# Patient Record
Sex: FEMALE | Race: BLACK OR AFRICAN AMERICAN | NOT HISPANIC OR LATINO | URBAN - METROPOLITAN AREA
[De-identification: names, ages, dates, MRNs, and addresses within clinical notes are randomized per-mention and may not be internally consistent; named-entity substitution may affect disease eponyms.]

---
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
## 2018-04-18 ENCOUNTER — EMERGENCY (EMERGENCY)
Facility: HOSPITAL | Age: 18
LOS: 1 days | Discharge: HOME | End: 2018-04-18
Attending: EMERGENCY MEDICINE
Payer: COMMERCIAL

## 2018-04-18 VITALS
SYSTOLIC BLOOD PRESSURE: 145 MMHG | OXYGEN SATURATION: 100 % | TEMPERATURE: 98 F | RESPIRATION RATE: 18 BRPM | DIASTOLIC BLOOD PRESSURE: 87 MMHG | HEART RATE: 82 BPM

## 2018-04-18 VITALS
RESPIRATION RATE: 16 BRPM | OXYGEN SATURATION: 100 % | SYSTOLIC BLOOD PRESSURE: 115 MMHG | HEART RATE: 75 BPM | TEMPERATURE: 99 F | DIASTOLIC BLOOD PRESSURE: 70 MMHG

## 2018-04-18 LAB
ALBUMIN SERPL ELPH-MCNC: 4.4 G/DL — SIGNIFICANT CHANGE UP (ref 3.3–5.2)
ALP SERPL-CCNC: 47 U/L — SIGNIFICANT CHANGE UP (ref 40–120)
ALT FLD-CCNC: 10 U/L — SIGNIFICANT CHANGE UP
ANION GAP SERPL CALC-SCNC: 12 MMOL/L — SIGNIFICANT CHANGE UP (ref 5–17)
ANISOCYTOSIS BLD QL: SLIGHT — SIGNIFICANT CHANGE UP
APPEARANCE UR: CLEAR — SIGNIFICANT CHANGE UP
APTT BLD: 25.9 SEC — LOW (ref 27.5–37.4)
AST SERPL-CCNC: 20 U/L — SIGNIFICANT CHANGE UP
BILIRUB SERPL-MCNC: 0.3 MG/DL — LOW (ref 0.4–2)
BILIRUB UR-MCNC: NEGATIVE — SIGNIFICANT CHANGE UP
BLD GP AB SCN SERPL QL: SIGNIFICANT CHANGE UP
BUN SERPL-MCNC: 10 MG/DL — SIGNIFICANT CHANGE UP (ref 8–20)
CALCIUM SERPL-MCNC: 9.2 MG/DL — SIGNIFICANT CHANGE UP (ref 8.6–10.2)
CHLORIDE SERPL-SCNC: 101 MMOL/L — SIGNIFICANT CHANGE UP (ref 98–107)
CO2 SERPL-SCNC: 23 MMOL/L — SIGNIFICANT CHANGE UP (ref 22–29)
COLOR SPEC: YELLOW — SIGNIFICANT CHANGE UP
COMMENT - URINE: SIGNIFICANT CHANGE UP
CREAT SERPL-MCNC: 0.51 MG/DL — SIGNIFICANT CHANGE UP (ref 0.5–1.3)
DIFF PNL FLD: NEGATIVE — SIGNIFICANT CHANGE UP
GLUCOSE SERPL-MCNC: 103 MG/DL — SIGNIFICANT CHANGE UP (ref 70–115)
GLUCOSE UR QL: NEGATIVE MG/DL — SIGNIFICANT CHANGE UP
HCG UR QL: NEGATIVE — SIGNIFICANT CHANGE UP
HCT VFR BLD CALC: 31.2 % — LOW (ref 37–47)
HGB BLD-MCNC: 9.5 G/DL — LOW (ref 12–16)
HYPOCHROMIA BLD QL: SLIGHT — SIGNIFICANT CHANGE UP
INR BLD: 1.15 RATIO — SIGNIFICANT CHANGE UP (ref 0.88–1.16)
KETONES UR-MCNC: NEGATIVE — SIGNIFICANT CHANGE UP
LEUKOCYTE ESTERASE UR-ACNC: NEGATIVE — SIGNIFICANT CHANGE UP
LIDOCAIN IGE QN: 40 U/L — SIGNIFICANT CHANGE UP (ref 22–51)
LYMPHOCYTES # BLD AUTO: 0.7 K/UL — LOW (ref 1–4.8)
LYMPHOCYTES # BLD AUTO: 9 % — LOW (ref 20–55)
MCHC RBC-ENTMCNC: 22.5 PG — LOW (ref 27–31)
MCHC RBC-ENTMCNC: 30.4 G/DL — LOW (ref 32–36)
MCV RBC AUTO: 73.8 FL — LOW (ref 81–99)
MICROCYTES BLD QL: SLIGHT — SIGNIFICANT CHANGE UP
MONOCYTES # BLD AUTO: 0.4 K/UL — SIGNIFICANT CHANGE UP (ref 0–0.8)
MONOCYTES NFR BLD AUTO: 5 % — SIGNIFICANT CHANGE UP (ref 3–10)
NEUTROPHILS # BLD AUTO: 6.7 K/UL — SIGNIFICANT CHANGE UP (ref 1.8–8)
NEUTROPHILS NFR BLD AUTO: 84 % — HIGH (ref 37–73)
NEUTS BAND # BLD: 2 % — SIGNIFICANT CHANGE UP (ref 0–8)
NITRITE UR-MCNC: NEGATIVE — SIGNIFICANT CHANGE UP
OVALOCYTES BLD QL SMEAR: SLIGHT — SIGNIFICANT CHANGE UP
PH UR: 6.5 — SIGNIFICANT CHANGE UP (ref 5–8)
PLAT MORPH BLD: NORMAL — SIGNIFICANT CHANGE UP
PLATELET # BLD AUTO: 508 K/UL — HIGH (ref 150–400)
POIKILOCYTOSIS BLD QL AUTO: SLIGHT — SIGNIFICANT CHANGE UP
POTASSIUM SERPL-MCNC: 3.8 MMOL/L — SIGNIFICANT CHANGE UP (ref 3.5–5.3)
POTASSIUM SERPL-SCNC: 3.8 MMOL/L — SIGNIFICANT CHANGE UP (ref 3.5–5.3)
PROT SERPL-MCNC: 8.5 G/DL — SIGNIFICANT CHANGE UP (ref 6.6–8.7)
PROT UR-MCNC: 15 MG/DL
PROTHROM AB SERPL-ACNC: 12.7 SEC — SIGNIFICANT CHANGE UP (ref 9.8–12.7)
RBC # BLD: 4.23 M/UL — LOW (ref 4.4–5.2)
RBC # FLD: 16.6 % — HIGH (ref 11–15.6)
RBC BLD AUTO: ABNORMAL
SODIUM SERPL-SCNC: 136 MMOL/L — SIGNIFICANT CHANGE UP (ref 135–145)
SP GR SPEC: 1.02 — SIGNIFICANT CHANGE UP (ref 1.01–1.02)
TYPE + AB SCN PNL BLD: SIGNIFICANT CHANGE UP
UROBILINOGEN FLD QL: 1 MG/DL
WBC # BLD: 7.9 K/UL — SIGNIFICANT CHANGE UP (ref 4.8–10.8)
WBC # FLD AUTO: 7.9 K/UL — SIGNIFICANT CHANGE UP (ref 4.8–10.8)
WBC UR QL: SIGNIFICANT CHANGE UP

## 2018-04-18 PROCEDURE — 87086 URINE CULTURE/COLONY COUNT: CPT

## 2018-04-18 PROCEDURE — 85610 PROTHROMBIN TIME: CPT

## 2018-04-18 PROCEDURE — 86850 RBC ANTIBODY SCREEN: CPT

## 2018-04-18 PROCEDURE — 80053 COMPREHEN METABOLIC PANEL: CPT

## 2018-04-18 PROCEDURE — 76856 US EXAM PELVIC COMPLETE: CPT

## 2018-04-18 PROCEDURE — 81025 URINE PREGNANCY TEST: CPT

## 2018-04-18 PROCEDURE — 85730 THROMBOPLASTIN TIME PARTIAL: CPT

## 2018-04-18 PROCEDURE — 76856 US EXAM PELVIC COMPLETE: CPT | Mod: 26

## 2018-04-18 PROCEDURE — 76770 US EXAM ABDO BACK WALL COMP: CPT | Mod: 26

## 2018-04-18 PROCEDURE — 86900 BLOOD TYPING SEROLOGIC ABO: CPT

## 2018-04-18 PROCEDURE — 99284 EMERGENCY DEPT VISIT MOD MDM: CPT | Mod: 25

## 2018-04-18 PROCEDURE — 76770 US EXAM ABDO BACK WALL COMP: CPT

## 2018-04-18 PROCEDURE — 83690 ASSAY OF LIPASE: CPT

## 2018-04-18 PROCEDURE — 99284 EMERGENCY DEPT VISIT MOD MDM: CPT

## 2018-04-18 PROCEDURE — 96374 THER/PROPH/DIAG INJ IV PUSH: CPT

## 2018-04-18 PROCEDURE — 85027 COMPLETE CBC AUTOMATED: CPT

## 2018-04-18 PROCEDURE — 36415 COLL VENOUS BLD VENIPUNCTURE: CPT

## 2018-04-18 PROCEDURE — 86901 BLOOD TYPING SEROLOGIC RH(D): CPT

## 2018-04-18 PROCEDURE — 81001 URINALYSIS AUTO W/SCOPE: CPT

## 2018-04-18 RX ORDER — ACETAMINOPHEN 500 MG
650 TABLET ORAL ONCE
Qty: 0 | Refills: 0 | Status: COMPLETED | OUTPATIENT
Start: 2018-04-18 | End: 2018-04-18

## 2018-04-18 RX ORDER — KETOROLAC TROMETHAMINE 30 MG/ML
15 SYRINGE (ML) INJECTION ONCE
Qty: 0 | Refills: 0 | Status: DISCONTINUED | OUTPATIENT
Start: 2018-04-18 | End: 2018-04-18

## 2018-04-18 RX ADMIN — Medication 650 MILLIGRAM(S): at 16:50

## 2018-04-18 RX ADMIN — Medication 15 MILLIGRAM(S): at 12:41

## 2018-04-18 RX ADMIN — Medication 15 MILLIGRAM(S): at 16:51

## 2018-04-18 NOTE — ED ADULT NURSE REASSESSMENT NOTE - NS ED NURSE REASSESS COMMENT FT1
pt conts to moisabel   intolerance to MOrphine 'I pass out'   pt states went to work this AM  ate [employer] late last night, while at work left and took an uber here  just had tea this AM   'I could be pregnant"   waiting lab result's  pt drinking for CT scan

## 2018-04-18 NOTE — ED ADULT NURSE NOTE - OBJECTIVE STATEMENT
pt presents to ED with abd pain. pt sleeping comfortably in recliner, pt woken up by RN and immediately started crying in pain. a&ox3. pt c/o n/v.  denies urinary complaints. a&ox3.

## 2018-04-18 NOTE — ED PROVIDER NOTE - ATTENDING CONTRIBUTION TO CARE
seen with acp  c/o lower abdominal pain with nausea and vomiting started her period 4 days ago  still  bleeding   pe abd tenderness in the lower abdomen  no guarding rebound  given toradol and had complete relief .patient is not pregnant  ultrasound is negative  Agree with acps assessment hx and physical

## 2018-04-18 NOTE — ED PROVIDER NOTE - PROGRESS NOTE DETAILS
Re-examination of patients abdomen, patient has no tenderness on palpation of abdomen, improvement of symptoms   Reviewed ultrasound results, lab work and urine, patient informed to follow up with ob/gyn, patient preinted copy of results, pt explained d/c instructions, pt verbalizes understanding d/c instructions

## 2018-04-18 NOTE — ED PROVIDER NOTE - OBJECTIVE STATEMENT
Patient is a 17 y/o female c/o of abdominal pain that started suddenly at 5 a.m. Patient states pain woke her in the morning. Patient states abdominal pain is in her lower abdomen.  Patient describes pain as sharp, and radiating to her lower back bilaterally. Patient admits to past history of kidney stones, states she has had kidney stones multiple times but never followed up with a radiologist. Patient denies abdominal surgeries. Patient admits she is currently on her menstrual cycle, but states her period was four days late and her period currently is very light, which is unusual for her menstrual cycle. Patient admits she can be pregnant. Patient admits to vomiting this morning one episode (non-bloody, non-bilious) and nausea. Patient denies chest pain, SOB, fevers, dysuria.

## 2018-04-18 NOTE — ED PROVIDER NOTE - PHYSICAL EXAMINATION
General: Well appearing, very uncomfortable and tearful on exam Eyes: PERRL, conjunctiva pink, sclera white bilaterally Cardio: S1/S2, no murmurs Resp: Clear to auscultation bilaterally no wheezes, rales or rhonchi Abd: Soft, lower abdominal tenderness on examination (RLQ/LLQ), nondistended : Right CVA tenderness MSK: FROM in all extremities Skin: Warm, dry without rashes

## 2018-04-19 LAB
CULTURE RESULTS: SIGNIFICANT CHANGE UP
SPECIMEN SOURCE: SIGNIFICANT CHANGE UP

## 2019-01-22 ENCOUNTER — EMERGENCY (EMERGENCY)
Facility: HOSPITAL | Age: 19
LOS: 1 days | Discharge: HOME | End: 2019-01-22
Attending: EMERGENCY MEDICINE
Payer: COMMERCIAL

## 2019-01-22 VITALS
WEIGHT: 143.08 LBS | RESPIRATION RATE: 17 BRPM | HEART RATE: 78 BPM | HEIGHT: 64 IN | OXYGEN SATURATION: 100 % | TEMPERATURE: 98 F | SYSTOLIC BLOOD PRESSURE: 109 MMHG | DIASTOLIC BLOOD PRESSURE: 72 MMHG

## 2019-01-22 LAB
ALBUMIN SERPL ELPH-MCNC: 4.6 G/DL — SIGNIFICANT CHANGE UP (ref 3.3–5.2)
ALP SERPL-CCNC: 44 U/L — SIGNIFICANT CHANGE UP (ref 40–120)
ALT FLD-CCNC: 12 U/L — SIGNIFICANT CHANGE UP
ANION GAP SERPL CALC-SCNC: 14 MMOL/L — SIGNIFICANT CHANGE UP (ref 5–17)
APPEARANCE UR: CLEAR — SIGNIFICANT CHANGE UP
AST SERPL-CCNC: 28 U/L — SIGNIFICANT CHANGE UP
BILIRUB SERPL-MCNC: 0.4 MG/DL — SIGNIFICANT CHANGE UP (ref 0.4–2)
BILIRUB UR-MCNC: NEGATIVE — SIGNIFICANT CHANGE UP
BUN SERPL-MCNC: 9 MG/DL — SIGNIFICANT CHANGE UP (ref 8–20)
CALCIUM SERPL-MCNC: 9 MG/DL — SIGNIFICANT CHANGE UP (ref 8.6–10.2)
CHLORIDE SERPL-SCNC: 101 MMOL/L — SIGNIFICANT CHANGE UP (ref 98–107)
CO2 SERPL-SCNC: 22 MMOL/L — SIGNIFICANT CHANGE UP (ref 22–29)
COLOR SPEC: YELLOW — SIGNIFICANT CHANGE UP
CREAT SERPL-MCNC: 0.49 MG/DL — LOW (ref 0.5–1.3)
DIFF PNL FLD: NEGATIVE — SIGNIFICANT CHANGE UP
GLUCOSE SERPL-MCNC: 98 MG/DL — SIGNIFICANT CHANGE UP (ref 70–115)
GLUCOSE UR QL: NEGATIVE MG/DL — SIGNIFICANT CHANGE UP
HCG UR QL: NEGATIVE — SIGNIFICANT CHANGE UP
HCT VFR BLD CALC: 30.8 % — LOW (ref 37–47)
HGB BLD-MCNC: 8.7 G/DL — LOW (ref 12–16)
KETONES UR-MCNC: NEGATIVE — SIGNIFICANT CHANGE UP
LEUKOCYTE ESTERASE UR-ACNC: ABNORMAL
MCHC RBC-ENTMCNC: 19.6 PG — LOW (ref 27–31)
MCHC RBC-ENTMCNC: 28.2 G/DL — LOW (ref 32–36)
MCV RBC AUTO: 69.4 FL — LOW (ref 81–99)
NITRITE UR-MCNC: NEGATIVE — SIGNIFICANT CHANGE UP
PH UR: 6.5 — SIGNIFICANT CHANGE UP (ref 5–8)
PLATELET # BLD AUTO: 647 K/UL — HIGH (ref 150–400)
POTASSIUM SERPL-MCNC: 3.7 MMOL/L — SIGNIFICANT CHANGE UP (ref 3.5–5.3)
POTASSIUM SERPL-SCNC: 3.7 MMOL/L — SIGNIFICANT CHANGE UP (ref 3.5–5.3)
PROT SERPL-MCNC: 8.6 G/DL — SIGNIFICANT CHANGE UP (ref 6.6–8.7)
PROT UR-MCNC: 15 MG/DL
RBC # BLD: 4.44 M/UL — SIGNIFICANT CHANGE UP (ref 4.4–5.2)
RBC # FLD: 18.3 % — HIGH (ref 11–15.6)
SODIUM SERPL-SCNC: 137 MMOL/L — SIGNIFICANT CHANGE UP (ref 135–145)
SP GR SPEC: 1.01 — SIGNIFICANT CHANGE UP (ref 1.01–1.02)
UROBILINOGEN FLD QL: NEGATIVE MG/DL — SIGNIFICANT CHANGE UP
WBC # BLD: 7.1 K/UL — SIGNIFICANT CHANGE UP (ref 4.8–10.8)
WBC # FLD AUTO: 7.1 K/UL — SIGNIFICANT CHANGE UP (ref 4.8–10.8)

## 2019-01-22 PROCEDURE — 85027 COMPLETE CBC AUTOMATED: CPT

## 2019-01-22 PROCEDURE — 74176 CT ABD & PELVIS W/O CONTRAST: CPT | Mod: 26

## 2019-01-22 PROCEDURE — 81001 URINALYSIS AUTO W/SCOPE: CPT

## 2019-01-22 PROCEDURE — 96375 TX/PRO/DX INJ NEW DRUG ADDON: CPT

## 2019-01-22 PROCEDURE — 99284 EMERGENCY DEPT VISIT MOD MDM: CPT | Mod: 25

## 2019-01-22 PROCEDURE — 96374 THER/PROPH/DIAG INJ IV PUSH: CPT

## 2019-01-22 PROCEDURE — 36415 COLL VENOUS BLD VENIPUNCTURE: CPT

## 2019-01-22 PROCEDURE — 99284 EMERGENCY DEPT VISIT MOD MDM: CPT

## 2019-01-22 PROCEDURE — 81025 URINE PREGNANCY TEST: CPT

## 2019-01-22 PROCEDURE — 74176 CT ABD & PELVIS W/O CONTRAST: CPT

## 2019-01-22 PROCEDURE — 80053 COMPREHEN METABOLIC PANEL: CPT

## 2019-01-22 RX ORDER — ONDANSETRON 8 MG/1
8 TABLET, FILM COATED ORAL ONCE
Qty: 0 | Refills: 0 | Status: COMPLETED | OUTPATIENT
Start: 2019-01-22 | End: 2019-01-22

## 2019-01-22 RX ORDER — ONDANSETRON 8 MG/1
8 TABLET, FILM COATED ORAL ONCE
Qty: 0 | Refills: 0 | Status: DISCONTINUED | OUTPATIENT
Start: 2019-01-22 | End: 2019-01-22

## 2019-01-22 RX ORDER — METOCLOPRAMIDE HCL 10 MG
8 TABLET ORAL ONCE
Qty: 0 | Refills: 0 | Status: DISCONTINUED | OUTPATIENT
Start: 2019-01-22 | End: 2019-01-27

## 2019-01-22 RX ORDER — KETOROLAC TROMETHAMINE 30 MG/ML
15 SYRINGE (ML) INJECTION ONCE
Qty: 0 | Refills: 0 | Status: DISCONTINUED | OUTPATIENT
Start: 2019-01-22 | End: 2019-01-22

## 2019-01-22 RX ORDER — SODIUM CHLORIDE 9 MG/ML
1000 INJECTION INTRAMUSCULAR; INTRAVENOUS; SUBCUTANEOUS ONCE
Qty: 0 | Refills: 0 | Status: COMPLETED | OUTPATIENT
Start: 2019-01-22 | End: 2019-01-22

## 2019-01-22 RX ADMIN — Medication 15 MILLIGRAM(S): at 13:07

## 2019-01-22 RX ADMIN — SODIUM CHLORIDE 1000 MILLILITER(S): 9 INJECTION INTRAMUSCULAR; INTRAVENOUS; SUBCUTANEOUS at 13:07

## 2019-01-22 RX ADMIN — ONDANSETRON 8 MILLIGRAM(S): 8 TABLET, FILM COATED ORAL at 13:06

## 2019-01-22 RX ADMIN — Medication 15 MILLIGRAM(S): at 13:43

## 2019-01-22 NOTE — ED STATDOCS - PROGRESS NOTE DETAILS
Lab/CT results discussed with patient. Patient informed no renal stone on CT. Pt instructed to use OTC tylenol/motrin as needed for pain and to follow-up with her PMD.

## 2019-01-22 NOTE — ED ADULT NURSE REASSESSMENT NOTE - NS ED NURSE REASSESS COMMENT FT1
pt educated on why itis important to wait for pregnancy test regarding toradol. pt reports "I don't care. Im not pregnant. give me the pain medicine."

## 2019-01-22 NOTE — ED STATDOCS - NS_ ATTENDINGSCRIBEDETAILS _ED_A_ED_FT
I performed the initial face to face bedside interview with this patient regarding history of present illness, review of symptoms and relevant past medical, social and family history.  I completed an independent physical examination.  I was the initial provider who evaluated this patient. I have signed out the follow up of any pending tests (i.e. labs, radiological studies) to the ACP.  I have communicated the patient’s plan of care and disposition with the ACP.

## 2019-01-22 NOTE — ED ADULT NURSE NOTE - OBJECTIVE STATEMENT
"I feel like I have another kidney stone." left flank pain radiating to the abdomen with n/v/d. pt has no other complaints.

## 2019-01-22 NOTE — ED ADULT TRIAGE NOTE - CHIEF COMPLAINT QUOTE
sent from md office for eval of n/v/d onset yesterday states took a friends oxy cause she was having flank pain ,hx stones. vss

## 2019-01-22 NOTE — ED ADULT NURSE NOTE - NSIMPLEMENTINTERV_GEN_ALL_ED
Implemented All Universal Safety Interventions:  [place] to call system. Call bell, personal items and telephone within reach. Instruct patient to call for assistance. Room bathroom lighting operational. Non-slip footwear when patient is off stretcher. Physically safe environment: no spills, clutter or unnecessary equipment. Stretcher in lowest position, wheels locked, appropriate side rails in place.

## 2019-01-22 NOTE — ED STATDOCS - ENMT, MLM
Nasal mucosa clear.  Mouth with normal mucosa.  Throat has no vesicles, no oropharyngeal exudates and uvula is midline.

## 2019-01-22 NOTE — ED STATDOCS - OBJECTIVE STATEMENT
18 y/o F pt with PMHx kidney stone (last stone one year ago), pyelonephritis, asthma presents to the ED c/o L-sided abdominal pain beginning last night.  She reports L-sided abdominal pain, radiating to her L flank.  Pt was having difficult sleeping last night secondary to her pain, and this morning pt began feeling, had one episode of vomiting and one episode of diarrhea.  Pt's current pain feels similar to her previous kidney stone.  Denies fever, chills, dysuria, hematuria, CP, SOB.  No further acute complaints at this time.

## 2019-01-22 NOTE — ED STATDOCS - MEDICAL DECISION MAKING DETAILS
pt with hx kidney stone, current pain feels similar, severe nausea, will treat nausea and pain and evaluate for stone.

## 2019-02-01 PROBLEM — N12 TUBULO-INTERSTITIAL NEPHRITIS, NOT SPECIFIED AS ACUTE OR CHRONIC: Chronic | Status: INACTIVE | Noted: 2017-07-11 | Resolved: 2019-01-31
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
## 2020-05-12 ENCOUNTER — APPOINTMENT (OUTPATIENT)
Dept: ANTEPARTUM | Facility: CLINIC | Age: 20
End: 2020-05-12
Payer: MEDICAID

## 2020-05-12 ENCOUNTER — APPOINTMENT (OUTPATIENT)
Dept: OBGYN | Facility: CLINIC | Age: 20
End: 2020-05-12
Payer: MEDICAID

## 2020-05-12 ENCOUNTER — ASOB RESULT (OUTPATIENT)
Age: 20
End: 2020-05-12

## 2020-05-12 VITALS
DIASTOLIC BLOOD PRESSURE: 70 MMHG | SYSTOLIC BLOOD PRESSURE: 118 MMHG | WEIGHT: 143 LBS | HEIGHT: 64 IN | BODY MASS INDEX: 24.41 KG/M2

## 2020-05-12 DIAGNOSIS — Z86.69 PERSONAL HISTORY OF OTHER DISEASES OF THE NERVOUS SYSTEM AND SENSE ORGANS: ICD-10-CM

## 2020-05-12 DIAGNOSIS — Z86.2 PERSONAL HISTORY OF DISEASES OF THE BLOOD AND BLOOD-FORMING ORGANS AND CERTAIN DISORDERS INVOLVING THE IMMUNE MECHANISM: ICD-10-CM

## 2020-05-12 DIAGNOSIS — Z82.5 FAMILY HISTORY OF ASTHMA AND OTHER CHRONIC LOWER RESPIRATORY DISEASES: ICD-10-CM

## 2020-05-12 DIAGNOSIS — Z78.9 OTHER SPECIFIED HEALTH STATUS: ICD-10-CM

## 2020-05-12 DIAGNOSIS — D64.9 ANEMIA, UNSPECIFIED: ICD-10-CM

## 2020-05-12 DIAGNOSIS — Z82.49 FAMILY HISTORY OF ISCHEMIC HEART DISEASE AND OTHER DISEASES OF THE CIRCULATORY SYSTEM: ICD-10-CM

## 2020-05-12 DIAGNOSIS — Z87.09 PERSONAL HISTORY OF OTHER DISEASES OF THE RESPIRATORY SYSTEM: ICD-10-CM

## 2020-05-12 DIAGNOSIS — Z83.3 FAMILY HISTORY OF DIABETES MELLITUS: ICD-10-CM

## 2020-05-12 DIAGNOSIS — Z80.0 FAMILY HISTORY OF MALIGNANT NEOPLASM OF DIGESTIVE ORGANS: ICD-10-CM

## 2020-05-12 LAB
HCG UR QL: POSITIVE
QUALITY CONTROL: YES

## 2020-05-12 PROCEDURE — 36415 COLL VENOUS BLD VENIPUNCTURE: CPT

## 2020-05-12 PROCEDURE — 99203 OFFICE O/P NEW LOW 30 MIN: CPT | Mod: 25

## 2020-05-12 PROCEDURE — 81025 URINE PREGNANCY TEST: CPT

## 2020-05-12 PROCEDURE — 99385 PREV VISIT NEW AGE 18-39: CPT

## 2020-05-12 PROCEDURE — 76817 TRANSVAGINAL US OBSTETRIC: CPT

## 2020-05-12 NOTE — PHYSICAL EXAM
[Alert] : alert [Awake] : awake [Soft] : soft [Oriented x3] : oriented to person, place, and time [Normal] : cervix [No Bleeding] : there was no active vaginal bleeding [Enlarged ___ wks] : enlarged [unfilled] ~Uweeks [Uterine Adnexae] : were not tender and not enlarged [Acute Distress] : no acute distress [Mass] : no breast mass [Nipple Discharge] : no nipple discharge [Axillary LAD] : no axillary lymphadenopathy [Tender] : non tender

## 2020-05-12 NOTE — HISTORY OF PRESENT ILLNESS
[Regular Cycle Intervals] : periods have been regular [Frequency: Q ___ days] : menstrual periods occur approximately every [unfilled] days [Menarche Age: ____] : age at menarche was [unfilled] [Spontaneous/Secondary] : is secondary/spontaneous [Pregnancy] : pregnant [Sexually Active] : is sexually active [Contraception] : does not use contraception

## 2020-05-15 PROBLEM — D64.9 ANEMIA: Status: ACTIVE | Noted: 2020-05-15

## 2020-05-15 LAB
ABO + RH PNL BLD: NORMAL
ALBUMIN SERPL ELPH-MCNC: 4.6 G/DL
ALP BLD-CCNC: 40 U/L
ALT SERPL-CCNC: 11 U/L
ANION GAP SERPL CALC-SCNC: 16 MMOL/L
APPEARANCE: CLEAR
AST SERPL-CCNC: 20 U/L
B19V IGG SER QL IA: 7 INDEX
B19V IGG+IGM SER-IMP: NORMAL
B19V IGG+IGM SER-IMP: POSITIVE
B19V IGM FLD-ACNC: 0.2 INDEX
B19V IGM SER-ACNC: NEGATIVE
BASOPHILS # BLD AUTO: 0.05 K/UL
BASOPHILS NFR BLD AUTO: 0.9 %
BILIRUB SERPL-MCNC: 0.4 MG/DL
BILIRUBIN URINE: NEGATIVE
BLD GP AB SCN SERPL QL: NORMAL
BLOOD URINE: NEGATIVE
BUN SERPL-MCNC: 8 MG/DL
C TRACH RRNA SPEC QL NAA+PROBE: NOT DETECTED
CALCIUM SERPL-MCNC: 9.5 MG/DL
CHLORIDE SERPL-SCNC: 100 MMOL/L
CMV IGG SERPL QL: <0.2 U/ML
CMV IGG SERPL-IMP: NEGATIVE
CO2 SERPL-SCNC: 21 MMOL/L
COLOR: NORMAL
CREAT SERPL-MCNC: 0.53 MG/DL
EOSINOPHIL # BLD AUTO: 0.12 K/UL
EOSINOPHIL NFR BLD AUTO: 2.1 %
GLUCOSE QUALITATIVE U: NEGATIVE
GLUCOSE SERPL-MCNC: 48 MG/DL
HBV SURFACE AG SER QL: NONREACTIVE
HCT VFR BLD CALC: 28.3 %
HCV AB SER QL: NONREACTIVE
HCV S/CO RATIO: 0.24 S/CO
HGB A MFR BLD: 98.1 %
HGB A2 MFR BLD: 1.9 %
HGB BLD-MCNC: 7.6 G/DL
HGB FRACT BLD-IMP: NORMAL
HIV1+2 AB SPEC QL IA.RAPID: NONREACTIVE
HPV HIGH+LOW RISK DNA PNL CVX: DETECTED
IMM GRANULOCYTES NFR BLD AUTO: 0.3 %
KETONES URINE: NEGATIVE
LEAD BLD-MCNC: NORMAL UG/DL
LEUKOCYTE ESTERASE URINE: NEGATIVE
LYMPHOCYTES # BLD AUTO: 2.05 K/UL
LYMPHOCYTES NFR BLD AUTO: 35.8 %
M TB IFN-G BLD-IMP: NEGATIVE
MAN DIFF?: NORMAL
MCHC RBC-ENTMCNC: 19.2 PG
MCHC RBC-ENTMCNC: 26.9 GM/DL
MCV RBC AUTO: 71.6 FL
MEV IGG FLD QL IA: <5 AU/ML
MEV IGG+IGM SER-IMP: NEGATIVE
MONOCYTES # BLD AUTO: 0.51 K/UL
MONOCYTES NFR BLD AUTO: 8.9 %
MUV AB SER-ACNC: NEGATIVE
MUV IGG SER QL IA: 6.6 AU/ML
N GONORRHOEA RRNA SPEC QL NAA+PROBE: NOT DETECTED
NEUTROPHILS # BLD AUTO: 2.97 K/UL
NEUTROPHILS NFR BLD AUTO: 52 %
NITRITE URINE: NEGATIVE
PH URINE: 6.5
PLATELET # BLD AUTO: 558 K/UL
POTASSIUM SERPL-SCNC: 4.4 MMOL/L
PROT SERPL-MCNC: 7.9 G/DL
PROTEIN URINE: NEGATIVE
QUANTIFERON TB PLUS MITOGEN MINUS NIL: 6.86 IU/ML
QUANTIFERON TB PLUS NIL: 0.01 IU/ML
QUANTIFERON TB PLUS TB1 MINUS NIL: 0 IU/ML
QUANTIFERON TB PLUS TB2 MINUS NIL: 0 IU/ML
RBC # BLD: 3.95 M/UL
RBC # FLD: 20.8 %
RUBV IGG FLD-ACNC: 2.8 INDEX
RUBV IGG SER-IMP: POSITIVE
SODIUM SERPL-SCNC: 137 MMOL/L
SOURCE AMPLIFICATION: NORMAL
SPECIFIC GRAVITY URINE: 1.01
T GONDII AB SER-IMP: NEGATIVE
T GONDII IGG SER QL: <3 IU/ML
T PALLIDUM AB SER QL IA: NEGATIVE
TSH SERPL-ACNC: 2.43 UIU/ML
UROBILINOGEN URINE: NORMAL
VZV AB TITR SER: POSITIVE
VZV IGG SER IF-ACNC: 2138 INDEX
WBC # FLD AUTO: 5.72 K/UL

## 2020-05-20 ENCOUNTER — APPOINTMENT (OUTPATIENT)
Dept: ANTEPARTUM | Facility: CLINIC | Age: 20
End: 2020-05-20
Payer: MEDICAID

## 2020-05-20 ENCOUNTER — ASOB RESULT (OUTPATIENT)
Age: 20
End: 2020-05-20

## 2020-05-20 VITALS — TEMPERATURE: 99.1 F

## 2020-05-20 PROCEDURE — 76817 TRANSVAGINAL US OBSTETRIC: CPT

## 2020-06-05 ENCOUNTER — NON-APPOINTMENT (OUTPATIENT)
Age: 20
End: 2020-06-05

## 2020-06-05 ENCOUNTER — APPOINTMENT (OUTPATIENT)
Dept: OBGYN | Facility: CLINIC | Age: 20
End: 2020-06-05
Payer: MEDICAID

## 2020-06-05 VITALS
WEIGHT: 147 LBS | DIASTOLIC BLOOD PRESSURE: 70 MMHG | BODY MASS INDEX: 25.1 KG/M2 | HEIGHT: 64 IN | SYSTOLIC BLOOD PRESSURE: 120 MMHG

## 2020-06-05 LAB
AR GENE MUT ANL BLD/T: NEGATIVE
CFTR MUT TESTED BLD/T: NEGATIVE
CYTOLOGY CVX/VAG DOC THIN PREP: ABNORMAL
FMR1 GENE MUT ANL BLD/T: NORMAL

## 2020-06-05 PROCEDURE — 99213 OFFICE O/P EST LOW 20 MIN: CPT | Mod: TH

## 2020-06-11 ENCOUNTER — APPOINTMENT (OUTPATIENT)
Dept: OBGYN | Facility: CLINIC | Age: 20
End: 2020-06-11
Payer: MEDICAID

## 2020-06-11 VITALS
DIASTOLIC BLOOD PRESSURE: 64 MMHG | HEIGHT: 64 IN | WEIGHT: 145 LBS | BODY MASS INDEX: 24.75 KG/M2 | SYSTOLIC BLOOD PRESSURE: 109 MMHG

## 2020-06-11 DIAGNOSIS — R87.612 LOW GRADE SQUAMOUS INTRAEPITHELIAL LESION ON CYTOLOGIC SMEAR OF CERVIX (LGSIL): ICD-10-CM

## 2020-06-11 PROCEDURE — 57452 EXAM OF CERVIX W/SCOPE: CPT

## 2020-06-11 NOTE — PROCEDURE
[LGSIL] : low grade squamous intraepithelial lesion [Colposcopy] : colposcopy [Patient] : patient [Benefits] : benefits [Risks] : risks [Alternatives] : alternatives [Infection] : infection [Bleeding] : bleeding [Consent Obtained] : written consent was obtained prior to the procedure [Allergic Reaction] : allergic reaction [SCJ Fully Visulized] : the squamocolumnar junction was fully visualized [Acetowhite ___ o'clock] : ascetowhite changes at [unfilled] ~Uo'clock [ECC Done] : Endocervical curettage was not performed. [Biopsies Taken: # ___] : no biopsies were taken of the cervix [Tolerated Well] : the patient tolerated the procedure well [No Complications] : there were no complications

## 2020-06-18 ENCOUNTER — APPOINTMENT (OUTPATIENT)
Dept: OBGYN | Facility: CLINIC | Age: 20
End: 2020-06-18
Payer: MEDICAID

## 2020-06-18 VITALS
HEIGHT: 64 IN | DIASTOLIC BLOOD PRESSURE: 70 MMHG | BODY MASS INDEX: 24.92 KG/M2 | SYSTOLIC BLOOD PRESSURE: 130 MMHG | WEIGHT: 146 LBS

## 2020-06-18 PROCEDURE — 99213 OFFICE O/P EST LOW 20 MIN: CPT | Mod: TH

## 2020-06-18 NOTE — HISTORY OF PRESENT ILLNESS
[Moderate Bleeding] : described as moderate in severity [Pregnancy] : pregnancy [Bleeding] : bleeding

## 2020-06-18 NOTE — PHYSICAL EXAM
[Alert] : alert [Awake] : awake [Soft] : soft [Oriented x3] : oriented to person, place, and time [No Bleeding] : there was no active vaginal bleeding [Normal] : uterus [Uterine Adnexae] : were not tender and not enlarged [Acute Distress] : no acute distress [Tender] : non tender [Dilated] : the cervix was not dilated

## 2020-06-18 NOTE — CHIEF COMPLAINT
[Urgent Visit] : Urgent Visit [FreeTextEntry1] : The patient presents complaining of vaginal bleeding.

## 2020-06-24 ENCOUNTER — APPOINTMENT (OUTPATIENT)
Dept: ANTEPARTUM | Facility: CLINIC | Age: 20
End: 2020-06-24
Payer: MEDICAID

## 2020-06-24 ENCOUNTER — ASOB RESULT (OUTPATIENT)
Age: 20
End: 2020-06-24

## 2020-06-24 PROCEDURE — 76813 OB US NUCHAL MEAS 1 GEST: CPT

## 2020-06-24 PROCEDURE — 36416 COLLJ CAPILLARY BLOOD SPEC: CPT

## 2020-06-26 LAB
1ST TRIMESTER DATA: NORMAL
ADDENDUM DOC: NORMAL
AFP PNL SERPL: NORMAL
AFP SERPL-ACNC: NORMAL
CLINICAL BIOCHEMIST REVIEW: NORMAL
FREE BETA HCG 1ST TRIMESTER: NORMAL
Lab: NORMAL
NASAL BONE: PRESENT
NOTES NTD: NORMAL
NT: NORMAL
PAPP-A SERPL-ACNC: NORMAL
TRISOMY 18/3: NORMAL

## 2020-06-27 ENCOUNTER — EMERGENCY (EMERGENCY)
Facility: HOSPITAL | Age: 20
LOS: 1 days | Discharge: HOME | End: 2020-06-27
Attending: EMERGENCY MEDICINE
Payer: COMMERCIAL

## 2020-06-27 VITALS
TEMPERATURE: 101 F | SYSTOLIC BLOOD PRESSURE: 112 MMHG | HEART RATE: 103 BPM | WEIGHT: 145.06 LBS | DIASTOLIC BLOOD PRESSURE: 67 MMHG | RESPIRATION RATE: 20 BRPM | OXYGEN SATURATION: 99 % | HEIGHT: 63 IN

## 2020-06-27 VITALS
TEMPERATURE: 98 F | OXYGEN SATURATION: 100 % | SYSTOLIC BLOOD PRESSURE: 124 MMHG | HEART RATE: 70 BPM | DIASTOLIC BLOOD PRESSURE: 71 MMHG

## 2020-06-27 LAB
ALBUMIN SERPL ELPH-MCNC: 3.9 G/DL — SIGNIFICANT CHANGE UP (ref 3.3–5.2)
ALP SERPL-CCNC: 30 U/L — LOW (ref 40–120)
ALT FLD-CCNC: 8 U/L — SIGNIFICANT CHANGE UP
ANION GAP SERPL CALC-SCNC: 11 MMOL/L — SIGNIFICANT CHANGE UP (ref 5–17)
ANISOCYTOSIS BLD QL: SLIGHT — SIGNIFICANT CHANGE UP
APPEARANCE UR: ABNORMAL
APTT BLD: 28.4 SEC — SIGNIFICANT CHANGE UP (ref 27.5–36.3)
AST SERPL-CCNC: 17 U/L — SIGNIFICANT CHANGE UP
BACTERIA # UR AUTO: ABNORMAL
BASOPHILS # BLD AUTO: 0.05 K/UL — SIGNIFICANT CHANGE UP (ref 0–0.2)
BASOPHILS NFR BLD AUTO: 0.6 % — SIGNIFICANT CHANGE UP (ref 0–2)
BILIRUB SERPL-MCNC: 0.2 MG/DL — LOW (ref 0.4–2)
BILIRUB UR-MCNC: NEGATIVE — SIGNIFICANT CHANGE UP
BUN SERPL-MCNC: 8 MG/DL — SIGNIFICANT CHANGE UP (ref 8–20)
CALCIUM SERPL-MCNC: 9.9 MG/DL — SIGNIFICANT CHANGE UP (ref 8.6–10.2)
CHLORIDE SERPL-SCNC: 101 MMOL/L — SIGNIFICANT CHANGE UP (ref 98–107)
CO2 SERPL-SCNC: 22 MMOL/L — SIGNIFICANT CHANGE UP (ref 22–29)
COLOR SPEC: YELLOW — SIGNIFICANT CHANGE UP
CREAT SERPL-MCNC: 0.37 MG/DL — LOW (ref 0.5–1.3)
DIFF PNL FLD: ABNORMAL
ELLIPTOCYTES BLD QL SMEAR: SLIGHT — SIGNIFICANT CHANGE UP
EOSINOPHIL # BLD AUTO: 0.17 K/UL — SIGNIFICANT CHANGE UP (ref 0–0.5)
EOSINOPHIL NFR BLD AUTO: 2.2 % — SIGNIFICANT CHANGE UP (ref 0–6)
EPI CELLS # UR: SIGNIFICANT CHANGE UP
GLUCOSE SERPL-MCNC: 76 MG/DL — SIGNIFICANT CHANGE UP (ref 70–99)
GLUCOSE UR QL: NEGATIVE MG/DL — SIGNIFICANT CHANGE UP
HCG SERPL-ACNC: HIGH MIU/ML
HCT VFR BLD CALC: 29 % — LOW (ref 34.5–45)
HGB BLD-MCNC: 8.6 G/DL — LOW (ref 11.5–15.5)
HYPOCHROMIA BLD QL: SLIGHT — SIGNIFICANT CHANGE UP
IMM GRANULOCYTES NFR BLD AUTO: 0.1 % — SIGNIFICANT CHANGE UP (ref 0–1.5)
INR BLD: 1.09 RATIO — SIGNIFICANT CHANGE UP (ref 0.88–1.16)
KETONES UR-MCNC: NEGATIVE — SIGNIFICANT CHANGE UP
LEUKOCYTE ESTERASE UR-ACNC: ABNORMAL
LYMPHOCYTES # BLD AUTO: 2.07 K/UL — SIGNIFICANT CHANGE UP (ref 1–3.3)
LYMPHOCYTES # BLD AUTO: 26.5 % — SIGNIFICANT CHANGE UP (ref 13–44)
MANUAL SMEAR VERIFICATION: SIGNIFICANT CHANGE UP
MCHC RBC-ENTMCNC: 20.7 PG — LOW (ref 27–34)
MCHC RBC-ENTMCNC: 29.7 GM/DL — LOW (ref 32–36)
MCV RBC AUTO: 69.9 FL — LOW (ref 80–100)
MICROCYTES BLD QL: SLIGHT — SIGNIFICANT CHANGE UP
MONOCYTES # BLD AUTO: 0.91 K/UL — HIGH (ref 0–0.9)
MONOCYTES NFR BLD AUTO: 11.6 % — SIGNIFICANT CHANGE UP (ref 2–14)
NEUTROPHILS # BLD AUTO: 4.61 K/UL — SIGNIFICANT CHANGE UP (ref 1.8–7.4)
NEUTROPHILS NFR BLD AUTO: 59 % — SIGNIFICANT CHANGE UP (ref 43–77)
NITRITE UR-MCNC: NEGATIVE — SIGNIFICANT CHANGE UP
PH UR: 7 — SIGNIFICANT CHANGE UP (ref 5–8)
PLAT MORPH BLD: NORMAL — SIGNIFICANT CHANGE UP
PLATELET # BLD AUTO: 569 K/UL — HIGH (ref 150–400)
POTASSIUM SERPL-MCNC: 4.2 MMOL/L — SIGNIFICANT CHANGE UP (ref 3.5–5.3)
POTASSIUM SERPL-SCNC: 4.2 MMOL/L — SIGNIFICANT CHANGE UP (ref 3.5–5.3)
PROT SERPL-MCNC: 7.5 G/DL — SIGNIFICANT CHANGE UP (ref 6.6–8.7)
PROT UR-MCNC: NEGATIVE MG/DL — SIGNIFICANT CHANGE UP
PROTHROM AB SERPL-ACNC: 12.4 SEC — SIGNIFICANT CHANGE UP (ref 10–12.9)
RBC # BLD: 4.15 M/UL — SIGNIFICANT CHANGE UP (ref 3.8–5.2)
RBC # FLD: 19.1 % — HIGH (ref 10.3–14.5)
RBC BLD AUTO: ABNORMAL
RBC CASTS # UR COMP ASSIST: ABNORMAL /HPF (ref 0–4)
SODIUM SERPL-SCNC: 134 MMOL/L — LOW (ref 135–145)
SP GR SPEC: 1.01 — SIGNIFICANT CHANGE UP (ref 1.01–1.02)
UROBILINOGEN FLD QL: 1 MG/DL
WBC # BLD: 7.82 K/UL — SIGNIFICANT CHANGE UP (ref 3.8–10.5)
WBC # FLD AUTO: 7.82 K/UL — SIGNIFICANT CHANGE UP (ref 3.8–10.5)
WBC UR QL: SIGNIFICANT CHANGE UP

## 2020-06-27 PROCEDURE — 84702 CHORIONIC GONADOTROPIN TEST: CPT

## 2020-06-27 PROCEDURE — 85610 PROTHROMBIN TIME: CPT

## 2020-06-27 PROCEDURE — 87086 URINE CULTURE/COLONY COUNT: CPT

## 2020-06-27 PROCEDURE — 96365 THER/PROPH/DIAG IV INF INIT: CPT

## 2020-06-27 PROCEDURE — 76801 OB US < 14 WKS SINGLE FETUS: CPT

## 2020-06-27 PROCEDURE — 74181 MRI ABDOMEN W/O CONTRAST: CPT

## 2020-06-27 PROCEDURE — 76801 OB US < 14 WKS SINGLE FETUS: CPT | Mod: 26

## 2020-06-27 PROCEDURE — 80053 COMPREHEN METABOLIC PANEL: CPT

## 2020-06-27 PROCEDURE — 99283 EMERGENCY DEPT VISIT LOW MDM: CPT

## 2020-06-27 PROCEDURE — 85730 THROMBOPLASTIN TIME PARTIAL: CPT

## 2020-06-27 PROCEDURE — 74181 MRI ABDOMEN W/O CONTRAST: CPT | Mod: 26

## 2020-06-27 PROCEDURE — G0378: CPT

## 2020-06-27 PROCEDURE — U0003: CPT

## 2020-06-27 PROCEDURE — 81001 URINALYSIS AUTO W/SCOPE: CPT

## 2020-06-27 PROCEDURE — 99220: CPT

## 2020-06-27 PROCEDURE — 36415 COLL VENOUS BLD VENIPUNCTURE: CPT

## 2020-06-27 PROCEDURE — 85027 COMPLETE CBC AUTOMATED: CPT

## 2020-06-27 PROCEDURE — 96366 THER/PROPH/DIAG IV INF ADDON: CPT

## 2020-06-27 PROCEDURE — 99284 EMERGENCY DEPT VISIT MOD MDM: CPT | Mod: 25

## 2020-06-27 RX ORDER — CEFTRIAXONE 500 MG/1
1000 INJECTION, POWDER, FOR SOLUTION INTRAMUSCULAR; INTRAVENOUS ONCE
Refills: 0 | Status: COMPLETED | OUTPATIENT
Start: 2020-06-27 | End: 2020-06-27

## 2020-06-27 RX ORDER — ACETAMINOPHEN 500 MG
650 TABLET ORAL ONCE
Refills: 0 | Status: COMPLETED | OUTPATIENT
Start: 2020-06-27 | End: 2020-06-27

## 2020-06-27 RX ORDER — ACETAMINOPHEN 500 MG
650 TABLET ORAL EVERY 6 HOURS
Refills: 0 | Status: DISCONTINUED | OUTPATIENT
Start: 2020-06-27 | End: 2020-07-02

## 2020-06-27 RX ORDER — CEPHALEXIN 500 MG
1 CAPSULE ORAL
Qty: 14 | Refills: 0
Start: 2020-06-27 | End: 2020-07-03

## 2020-06-27 RX ADMIN — CEFTRIAXONE 1000 MILLIGRAM(S): 500 INJECTION, POWDER, FOR SOLUTION INTRAMUSCULAR; INTRAVENOUS at 19:53

## 2020-06-27 RX ADMIN — CEFTRIAXONE 100 MILLIGRAM(S): 500 INJECTION, POWDER, FOR SOLUTION INTRAMUSCULAR; INTRAVENOUS at 17:55

## 2020-06-27 RX ADMIN — Medication 650 MILLIGRAM(S): at 17:08

## 2020-06-27 NOTE — ED PROVIDER NOTE - CLINICAL SUMMARY MEDICAL DECISION MAKING FREE TEXT BOX
Pt has MILD FEVER, WILL R/O COVID, UTI, will also check for US pelvis to asses for fhr. Will control pain, if pain persists, will cosider MRI abd pelvis to assess for alternate pathology

## 2020-06-27 NOTE — ED CDU PROVIDER INITIAL DAY NOTE - PROGRESS NOTE DETAILS
DICK Tolentino NOTE: Reviewed all results and plan with Dr. Garcia. Pt cleared by GYN, MRI without secondary signs of appendicitis. Abd remains minimally tender bilateral lower quadrants L>R. Pt stable for d/c, reports improvement, VSS, tolerating PO, ambulatory.  Discussion includes results, plan, proper medication use/side effects, and strict return precautions. Pt advised to f/u with PMD 1-2 days and specialists discussed.  Pt given printed copies of lab/radiology for outpt follow up. Pt verbalized understanding/agreement of plan.

## 2020-06-27 NOTE — ED ADULT NURSE NOTE - OBJECTIVE STATEMENT
Late Note time approximate  20 F, NAD, A & O X 3 c/o suprapubic and lower back pain x 2 days, pt pregnant,  EDC 2021. Full physical exam deferred to physician/ACP

## 2020-06-27 NOTE — CONSULT NOTE ADULT - SUBJECTIVE AND OBJECTIVE BOX
Name: DIMITRIOS WELCH  MRN: [id number]  Allergies: morphine (Other)      DIMITRIOS WELCH is a 20y G_P_  LMP __ presenting with     PAST OBSTETRICAL HISTORY:    PAST GYN HISTORY: Denies history of abormal paps, STDs, ovarian cysts, or uterine fibroids.   Menarche at , regular cycles q28-30d, 4-7d bleeding  PAST MEDICAL HISTORY:  Fibroids  Pyelonephritis  Asthma    PAST SURGICAL HISTORY:  No significant past surgical history    Home Medications:      SOCIAL:  Denies tobacco or drug use. Social drinker. Feels safe at home.     HOSPITAL MEDS:  acetaminophen   Tablet .. 650 milliGRAM(s) Oral every 6 hours PRN    Allergies    No Known Allergies    Intolerances    morphine (Other)      Vital Signs Last 24 Hrs  T(C): 37.1 (2020 17:35), Max: 38.1 (2020 15:22)  T(F): 98.7 (2020 17:35), Max: 100.5 (2020 15:22)  HR: 101 (2020 17:35) (101 - 103)  BP: 107/71 (2020 17:35) (107/71 - 112/67)  BP(mean): --  RR: 18 (2020 17:35) (18 - 20)  SpO2: 100% (2020 17:35) (99% - 100%)    PHYSICAL EXAM:  GEN: NAD, AOx3  Lung: CTABL, no WRR. Speaking in full sentences without shortness of breath.  CV: S1S2, RRR.  Abd: Soft, Nontender, Nondistended. No rebound tenderness or guarding. Bowel sounds present  PELVIC:        EXTERNAL GENITALIA: atraumatic, no lesions        VAGINA: No discharge or active bleeding         CERVIX: Pink, closed        UTERUS: appropriate size        ADNEXA: not palpable    LABS:                        8.6    7.82  )-----------( 569      ( 2020 16:44 )             29.0     06-27    134<L>  |  101  |  8.0  ----------------------------<  76  4.2   |  22.0  |  0.37<L>    Ca    9.9      2020 16:44    TPro  7.5  /  Alb  3.9  /  TBili  0.2<L>  /  DBili  x   /  AST  17  /  ALT  8   /  AlkPhos  30<L>      Urinalysis Basic - ( 2020 16:48 )    Color: Yellow / Appearance: Slightly Turbid / S.010 / pH: x  Gluc: x / Ketone: Negative  / Bili: Negative / Urobili: 1 mg/dL   Blood: x / Protein: Negative mg/dL / Nitrite: Negative   Leuk Esterase: Small / RBC: 11-25 /HPF / WBC 3-5   Sq Epi: x / Non Sq Epi: Few / Bacteria: Occasional      HCG Quantitative, Serum: 83014.0 mIU/mL (20 @ 16:44)        RADIOLOGY STUDIES:  < from: MR Abdomen No Cont (20 @ 21:13) >  INTERPRETATION:  Abdominal MR    Indication: Pregnant with abdominal pain, evaluate for acute appendicitis    Technique: Multiecho multiplanar images of the abdomen and pelvis are submitted.    Comparison: Pelvic ultrasound of the same ER visit.    Findings:    There is no free fluid or focal inflammation.    There are no gallstones or biliary dilatation.  The unenhanced liver, spleen, pancreas, and adrenal glands are unremarkable. There is no hydronephrosis.    There is no small bowel obstruction. The appendix is not visualized.     There is an enlarged gravid uterus with variable presentation. Please note that this study is not optimized for fetal anatomic survey. The placenta is anterior. There are nabothian cysts along the cervical canal.  Left body intramural fibroid measures 5.6 x 4.1 cm. Smaller fibroid in the right body measures 1.6 cm. The right ovary is not enlarged, measuring 2.4 x 1.7 cm with follicles. The left ovary is not enlarged, measuring 3 x 2.5 cm with follicles.    The urinary bladder is incompletely distended.    IMPRESSION:    No explanation for abdominal pain on this MRI.  Fibroid uterus.  Unremarkable appearance of the ovaries.  The appendix is not visualized but there are no secondary signs for acute appendicitis.      < end of copied text > Name: DIMITRIOS WELCH  MRN: [id number]  Allergies: morphine (Other)      DIMITRIOS WELCH is a 20y  at 12w5d by LMP 18 presenting with sharp abdominal pain which began today. Patient reports no radiation, alleviating or aggravating factors. The pain began in her suprapubic area and has been constant. She did not know she had fibroids before today.   Yesterday, patient noticed some spotting in her underwear but it has since resolved.  Denies fevers, chills, cramping, or loss of fluid.    PAST GYN HISTORY: Fibroid uterus. Denies history of abnormal paps, STDs, or ovarian cysts.    PAST MEDICAL HISTORY:  Fibroids  Pyelonephritis  Asthma    PAST SURGICAL HISTORY:  No significant past surgical history    Home Medications: PNVs    SOCIAL:  Denies tobacco or drug use. Social drinker. Feels safe at home.     HOSPITAL MEDS:  acetaminophen   Tablet .. 650 milliGRAM(s) Oral every 6 hours PRN    Allergies  No Known Allergies  Intolerances  morphine (Other)    Vital Signs Last 24 Hrs  T(C): 37.1 (2020 17:35), Max: 38.1 (2020 15:22)  T(F): 98.7 (2020 17:35), Max: 100.5 (2020 15:22)  HR: 101 (2020 17:35) (101 - 103)  BP: 107/71 (2020 17:35) (107/71 - 112/67)  RR: 18 (2020 17:35) (18 - 20)  SpO2: 100% (2020 17:35) (99% - 100%)    PHYSICAL EXAM:  GEN: NAD, AOx3  Lung: Speaking in full sentences without shortness of breath.  Abd: Soft, suprapubic tenderness, Nondistended. No rebound tenderness or guarding.   PELVIC: deferred    LABS:                        8.6    7.82  )-----------( 569      ( 2020 16:44 )             29.0     06-    134<L>  |  101  |  8.0  ----------------------------<  76  4.2   |  22.0  |  0.37<L>    Ca    9.9      2020 16:44    TPro  7.5  /  Alb  3.9  /  TBili  0.2<L>  /  DBili  x   /  AST  17  /  ALT  8   /  AlkPhos  30<L>      Urinalysis Basic - ( 2020 16:48 )    Color: Yellow / Appearance: Slightly Turbid / S.010 / pH: x  Gluc: x / Ketone: Negative  / Bili: Negative / Urobili: 1 mg/dL   Blood: x / Protein: Negative mg/dL / Nitrite: Negative   Leuk Esterase: Small / RBC: 11-25 /HPF / WBC 3-5   Sq Epi: x / Non Sq Epi: Few / Bacteria: Occasional    HCG Quantitative, Serum: 83415.0 mIU/mL (20 @ 16:44)    RADIOLOGY STUDIES:  < from: MR Abdomen No Cont (20 @ 21:13) >  INTERPRETATION:  Abdominal MR    Indication: Pregnant with abdominal pain, evaluate for acute appendicitis    Technique: Multiecho multiplanar images of the abdomen and pelvis are submitted.    Comparison: Pelvic ultrasound of the same ER visit.    Findings:    There is no free fluid or focal inflammation.    There are no gallstones or biliary dilatation.  The unenhanced liver, spleen, pancreas, and adrenal glands are unremarkable. There is no hydronephrosis.    There is no small bowel obstruction. The appendix is not visualized.     There is an enlarged gravid uterus with variable presentation. Please note that this study is not optimized for fetal anatomic survey. The placenta is anterior. There are nabothian cysts along the cervical canal.  Left body intramural fibroid measures 5.6 x 4.1 cm. Smaller fibroid in the right body measures 1.6 cm. The right ovary is not enlarged, measuring 2.4 x 1.7 cm with follicles. The left ovary is not enlarged, measuring 3 x 2.5 cm with follicles.    The urinary bladder is incompletely distended.    IMPRESSION:    No explanation for abdominal pain on this MRI.  Fibroid uterus.  Unremarkable appearance of the ovaries.  The appendix is not visualized but there are no secondary signs for acute appendicitis.      < end of copied text >

## 2020-06-27 NOTE — ED CDU PROVIDER INITIAL DAY NOTE - MEDICAL DECISION MAKING DETAILS
21 y/o F with PMHx fibroid, pyelonephritis, asthma currently  presents to ED c/o lower abdominal pain since yesterday. Ultrasound showing + IUP and fibroids. Pt placed in observation for GYN consult and MRI. Pt currently declining anti-nausea medications.

## 2020-06-27 NOTE — ED CDU PROVIDER DISPOSITION NOTE - PATIENT PORTAL LINK FT
You can access the FollowMyHealth Patient Portal offered by [facility] by registering at the following website: [email or web address]. By joining [employer]’s FollowMyHealth portal, you will also be able to view your health information using other applications (apps) compatible with our system.

## 2020-06-27 NOTE — CONSULT NOTE ADULT - ASSESSMENT
20y  at 12w5d by LMP 18 here with abdominal pain secondary to uterine fibroid growth during pregnancy. Patient counseled that she will continue to feel pain due the fibroids growing with the pregnancy. Advised patient to take Tylenol PRN but not to exceed 4g in one day.  Advised patient to call her provider or return to the ED if she has any cramping, vaginal bleeding or loss of fluid.  She has a f/u appointment with Dr. Avina this week    d/w Dr. Mayorga

## 2020-06-27 NOTE — ED CDU PROVIDER INITIAL DAY NOTE - PHYSICAL EXAMINATION
Vital signs noted, see flowsheet.  General: NAD, well appearing and non-toxic.  HEENT: NC/AT. MMM. Conjunctiva and sclera clear b/l.  EOMI. PERRL.  Neck: Soft and supple, full ROM without pain.  Cardiac: RRR. +S1/S2. Peripheral pulses 2+ and symmetric b/l.   Respiratory: Speaking in full sentences. Lungs CTA b/l, no wheezes/rhonchi/rales/stridor.   Abdomen: Normoactive bowel sounds x 4 quadrants. Soft, ND, lower abdomen tenderness. No guarding or rebound tenderness  Back: Spine midline and non-tender. No CVAT.  Skin: Normal color for race, no evidence of rash, ecchymosis, cyanosis or jaundice.   Neuro: Awake, alert and oriented to person/place/time/situation. Moves all extremities spontaneously and symmetrically.  No focal deficits  Psych: Normal affect Vital signs noted, see flowsheet.  General: NAD, well appearing and non-toxic.  HEENT: NC/AT. MMM. Conjunctiva and sclera clear b/l.  EOMI. PERRL.  Neck: Soft and supple, full ROM without pain.  Cardiac: RRR. +S1/S2. Peripheral pulses 2+ and symmetric b/l.   Respiratory: Speaking in full sentences. Lungs CTA b/l, no wheezes/rhonchi/rales/stridor.   Abdomen: Normoactive bowel sounds x 4 quadrants. Soft, ND, lower abdomen tenderness L>R. No guarding or rebound tenderness  Back: Spine midline and non-tender. No CVAT.  Skin: Normal color for race, no evidence of rash, ecchymosis, cyanosis or jaundice.   Neuro: Awake, alert and oriented to person/place/time/situation. Moves all extremities spontaneously and symmetrically.  No focal deficits  Psych: Normal affect

## 2020-06-27 NOTE — ED CDU PROVIDER INITIAL DAY NOTE - ATTENDING CONTRIBUTION TO CARE
I agree with the PA's note and was available for any issues/concerns. I was directly involved in patient care. My brief overall assessment is as follows:  at ~13 weeks with lower abd pain, fibroids on u/s. had small vag bleeding yesterday. no n/v or active bleeding. rh positive on previous t and s. abd with some lower ttp, no rebound/guarding, no focality. in obs for mr abd/pel, and gyn consult.

## 2020-06-27 NOTE — ED CDU PROVIDER DISPOSITION NOTE - CLINICAL COURSE
21 y/o F with PMHx fibroid, pyelonephritis, asthma currently  presents to ED c/o lower abdominal pain since yesterday. Ultrasound showing + IUP and fibroids. Pt placed in observation for GYN consult and MRI. Pt cleared by GYN for outpatient follow up. MRI without signs of acute appendicitis. Pt tolerating PO. Will d/c with keflex and f/u GYN.

## 2020-06-27 NOTE — ED CDU PROVIDER INITIAL DAY NOTE - OBJECTIVE STATEMENT
21 y/o F with PMHx fibroid, pyelonephritis, asthma currently  presents to ED c/o lower abdominal pain since yesterday. Pt reports 1 episode of spotting yesterday which is now resolved. Pt currently feeling nauseated. Pt also complaining of mild pain to lower back. Denies fever/chills, CP, SOB, vomiting. Pt had US on 2020 which was normal.  GYN: Fabrice

## 2020-06-27 NOTE — ED ADULT NURSE REASSESSMENT NOTE - NS ED NURSE REASSESS COMMENT FT1
assumed care of pt from previous RN. pt resting comfortably at this time. updated on plan of care. pt aware of NPO status.

## 2020-06-27 NOTE — ED CDU PROVIDER DISPOSITION NOTE - NSFOLLOWUPINSTRUCTIONS_ED_ALL_ED_FT
- Please follow up with your Primary Care Doctor in 24-48 hr.  - Seek immediate medical care for any new, worsening or concerning signs or symptoms.   - Take medications as directed, be sure to read all instructions on packaging, Be sure to complete entire course of antibiotics as directed   - You were given copies of all your test results, please bring to your primary care doctor for review  - Follow up with your gynecologist    Feel better!     Urinary Tract Infection    A urinary tract infection (UTI) is an infection of any part of the urinary tract, which includes the kidneys, ureters, bladder, and urethra. Risk factors include ignoring your need to urinate, wiping back to front if female, being an uncircumcised male, and having diabetes or a weak immune system. Symptoms include frequent urination, pain or burning with urination, foul smelling urine, cloudy urine, pain in the lower abdomen, blood in the urine, and fever. If you were prescribed an antibiotic medicine, take it as told by your health care provider. Do not stop taking the antibiotic even if you start to feel better.    SEEK IMMEDIATE MEDICAL CARE IF YOU HAVE ANY OF THE FOLLOWING SYMPTOMS: severe back or abdominal pain, fever, inability to keep fluids or medicine down, dizziness/lightheadedness, or a change in mental status.    Abdominal Pain    Many things can cause abdominal pain. Many times, abdominal pain is not caused by a disease and will improve without treatment. Your health care provider will do a physical exam to determine if there is a dangerous cause of your pain; blood tests and imaging may help determine the cause of your pain. However, in many cases, no cause may be found and you may need further testing as an outpatient. Monitor your abdominal pain for any changes.     SEEK IMMEDIATE MEDICAL CARE IF YOU HAVE ANY OF THE FOLLOWING SYMPTOMS: worsening abdominal pain, uncontrollable vomiting, profuse diarrhea, inability to have bowel movements or pass gas, black or bloody stools, fever accompanying chest pain or back pain, or fainting. These symptoms may represent a serious problem that is an emergency. Do not wait to see if the symptoms will go away. Get medical help right away. Call 911 and do not drive yourself to the hospital.

## 2020-06-27 NOTE — ED PROVIDER NOTE - OBJECTIVE STATEMENT
21 y/o F with h/o fibroids,  13 wks GA p/w lower pelvic pain and has episode of vag spotting yesterday and today has lsight yellow discharge, no fever, chills, dysuria, nausea, vomiting, fall. Last US On 2020 and was normal for GA

## 2020-06-27 NOTE — ED ADULT TRIAGE NOTE - CHIEF COMPLAINT QUOTE
Pt c/o abdominal pain starting this morning, c/o nausea, pt 13 weeks pregnant, states she has two uterine fibroids and a recent abnormal pap smear, called OBGYN yesterday regarding vag bleeding and told "that's normal"

## 2020-06-27 NOTE — ED CDU PROVIDER DISPOSITION NOTE - ATTENDING CONTRIBUTION TO CARE
I agree with the PA's note and was available for any issues/concerns. I was directly involved in patient care. My brief overall assessment is as follows: mri neg, seen by ob/gyn. feeling better, stable for d/c. return precautions.

## 2020-06-28 LAB
CULTURE RESULTS: SIGNIFICANT CHANGE UP
SARS-COV-2 RNA SPEC QL NAA+PROBE: SIGNIFICANT CHANGE UP
SPECIMEN SOURCE: SIGNIFICANT CHANGE UP

## 2020-06-29 ENCOUNTER — APPOINTMENT (OUTPATIENT)
Dept: OBGYN | Facility: CLINIC | Age: 20
End: 2020-06-29

## 2020-06-29 ENCOUNTER — APPOINTMENT (OUTPATIENT)
Dept: OBGYN | Facility: CLINIC | Age: 20
End: 2020-06-29
Payer: MEDICAID

## 2020-06-29 ENCOUNTER — NON-APPOINTMENT (OUTPATIENT)
Age: 20
End: 2020-06-29

## 2020-06-29 VITALS
HEIGHT: 64 IN | SYSTOLIC BLOOD PRESSURE: 118 MMHG | DIASTOLIC BLOOD PRESSURE: 70 MMHG | BODY MASS INDEX: 24.24 KG/M2 | WEIGHT: 142 LBS

## 2020-06-29 PROBLEM — D21.9 BENIGN NEOPLASM OF CONNECTIVE AND OTHER SOFT TISSUE, UNSPECIFIED: Chronic | Status: ACTIVE | Noted: 2020-06-27

## 2020-06-29 PROCEDURE — 99213 OFFICE O/P EST LOW 20 MIN: CPT | Mod: TH

## 2020-06-30 DIAGNOSIS — Z01.419 ENCOUNTER FOR GYNECOLOGICAL EXAMINATION (GENERAL) (ROUTINE) W/OUT ABNORMAL FINDINGS: ICD-10-CM

## 2020-06-30 DIAGNOSIS — Z87.42 PERSONAL HISTORY OF OTHER DISEASES OF THE FEMALE GENITAL TRACT: ICD-10-CM

## 2020-07-02 ENCOUNTER — NON-APPOINTMENT (OUTPATIENT)
Age: 20
End: 2020-07-02

## 2020-07-02 ENCOUNTER — APPOINTMENT (OUTPATIENT)
Dept: OBGYN | Facility: CLINIC | Age: 20
End: 2020-07-02
Payer: MEDICAID

## 2020-07-02 VITALS
SYSTOLIC BLOOD PRESSURE: 118 MMHG | HEIGHT: 64 IN | WEIGHT: 146 LBS | DIASTOLIC BLOOD PRESSURE: 70 MMHG | BODY MASS INDEX: 24.92 KG/M2

## 2020-07-02 PROCEDURE — 99213 OFFICE O/P EST LOW 20 MIN: CPT | Mod: TH

## 2020-07-30 ENCOUNTER — APPOINTMENT (OUTPATIENT)
Dept: OBGYN | Facility: CLINIC | Age: 20
End: 2020-07-30
Payer: MEDICAID

## 2020-07-30 ENCOUNTER — NON-APPOINTMENT (OUTPATIENT)
Age: 20
End: 2020-07-30

## 2020-07-30 VITALS
DIASTOLIC BLOOD PRESSURE: 70 MMHG | HEIGHT: 64 IN | SYSTOLIC BLOOD PRESSURE: 116 MMHG | WEIGHT: 149.44 LBS | BODY MASS INDEX: 25.51 KG/M2

## 2020-07-30 DIAGNOSIS — Z87.59 PERSONAL HISTORY OF OTHER COMPLICATIONS OF PREGNANCY, CHILDBIRTH AND THE PUERPERIUM: ICD-10-CM

## 2020-07-30 PROCEDURE — 99213 OFFICE O/P EST LOW 20 MIN: CPT | Mod: TH

## 2020-08-03 ENCOUNTER — TRANSCRIPTION ENCOUNTER (OUTPATIENT)
Age: 20
End: 2020-08-03

## 2020-08-11 ENCOUNTER — EMERGENCY (EMERGENCY)
Facility: HOSPITAL | Age: 20
LOS: 1 days | Discharge: HOME | End: 2020-08-11
Attending: EMERGENCY MEDICINE
Payer: COMMERCIAL

## 2020-08-11 VITALS
RESPIRATION RATE: 18 BRPM | DIASTOLIC BLOOD PRESSURE: 71 MMHG | HEART RATE: 80 BPM | OXYGEN SATURATION: 100 % | SYSTOLIC BLOOD PRESSURE: 107 MMHG

## 2020-08-11 VITALS
SYSTOLIC BLOOD PRESSURE: 114 MMHG | DIASTOLIC BLOOD PRESSURE: 75 MMHG | HEART RATE: 95 BPM | HEIGHT: 64 IN | RESPIRATION RATE: 22 BRPM | OXYGEN SATURATION: 100 % | TEMPERATURE: 99 F | WEIGHT: 149.91 LBS

## 2020-08-11 LAB
ACANTHOCYTES BLD QL SMEAR: SLIGHT — SIGNIFICANT CHANGE UP
ALBUMIN SERPL ELPH-MCNC: 3.6 G/DL — SIGNIFICANT CHANGE UP (ref 3.3–5.2)
ALP SERPL-CCNC: 41 U/L — SIGNIFICANT CHANGE UP (ref 40–120)
ALT FLD-CCNC: 10 U/L — SIGNIFICANT CHANGE UP
ANION GAP SERPL CALC-SCNC: 16 MMOL/L — SIGNIFICANT CHANGE UP (ref 5–17)
ANISOCYTOSIS BLD QL: SIGNIFICANT CHANGE UP
APPEARANCE UR: CLEAR — SIGNIFICANT CHANGE UP
AST SERPL-CCNC: 26 U/L — SIGNIFICANT CHANGE UP
BASOPHILS # BLD AUTO: 0 K/UL — SIGNIFICANT CHANGE UP (ref 0–0.2)
BASOPHILS NFR BLD AUTO: 0 % — SIGNIFICANT CHANGE UP (ref 0–2)
BILIRUB SERPL-MCNC: <0.2 MG/DL — LOW (ref 0.4–2)
BILIRUB UR-MCNC: NEGATIVE — SIGNIFICANT CHANGE UP
BUN SERPL-MCNC: 9 MG/DL — SIGNIFICANT CHANGE UP (ref 8–20)
CALCIUM SERPL-MCNC: 9.3 MG/DL — SIGNIFICANT CHANGE UP (ref 8.6–10.2)
CHLORIDE SERPL-SCNC: 99 MMOL/L — SIGNIFICANT CHANGE UP (ref 98–107)
CO2 SERPL-SCNC: 19 MMOL/L — LOW (ref 22–29)
COLOR SPEC: YELLOW — SIGNIFICANT CHANGE UP
CREAT SERPL-MCNC: 0.37 MG/DL — LOW (ref 0.5–1.3)
D DIMER BLD IA.RAPID-MCNC: 332 NG/ML DDU — HIGH
DIFF PNL FLD: NEGATIVE — SIGNIFICANT CHANGE UP
EOSINOPHIL # BLD AUTO: 0 K/UL — SIGNIFICANT CHANGE UP (ref 0–0.5)
EOSINOPHIL NFR BLD AUTO: 0 % — SIGNIFICANT CHANGE UP (ref 0–6)
GIANT PLATELETS BLD QL SMEAR: PRESENT — SIGNIFICANT CHANGE UP
GLUCOSE SERPL-MCNC: 81 MG/DL — SIGNIFICANT CHANGE UP (ref 70–99)
GLUCOSE UR QL: NEGATIVE MG/DL — SIGNIFICANT CHANGE UP
HCG UR QL: POSITIVE
HCT VFR BLD CALC: 27.1 % — LOW (ref 34.5–45)
HGB BLD-MCNC: 8 G/DL — LOW (ref 11.5–15.5)
HYPOCHROMIA BLD QL: SLIGHT — SIGNIFICANT CHANGE UP
KETONES UR-MCNC: NEGATIVE — SIGNIFICANT CHANGE UP
LEUKOCYTE ESTERASE UR-ACNC: ABNORMAL
LYMPHOCYTES # BLD AUTO: 2.58 K/UL — SIGNIFICANT CHANGE UP (ref 1–3.3)
LYMPHOCYTES # BLD AUTO: 27 % — SIGNIFICANT CHANGE UP (ref 13–44)
MACROCYTES BLD QL: SLIGHT — SIGNIFICANT CHANGE UP
MANUAL SMEAR VERIFICATION: SIGNIFICANT CHANGE UP
MCHC RBC-ENTMCNC: 20.5 PG — LOW (ref 27–34)
MCHC RBC-ENTMCNC: 29.5 GM/DL — LOW (ref 32–36)
MCV RBC AUTO: 69.3 FL — LOW (ref 80–100)
MICROCYTES BLD QL: SIGNIFICANT CHANGE UP
MONOCYTES # BLD AUTO: 0.16 K/UL — SIGNIFICANT CHANGE UP (ref 0–0.9)
MONOCYTES NFR BLD AUTO: 1.7 % — LOW (ref 2–14)
NEUTROPHILS # BLD AUTO: 6.82 K/UL — SIGNIFICANT CHANGE UP (ref 1.8–7.4)
NEUTROPHILS NFR BLD AUTO: 71.3 % — SIGNIFICANT CHANGE UP (ref 43–77)
NITRITE UR-MCNC: NEGATIVE — SIGNIFICANT CHANGE UP
NT-PROBNP SERPL-SCNC: 18 PG/ML — SIGNIFICANT CHANGE UP (ref 0–300)
OVALOCYTES BLD QL SMEAR: SLIGHT — SIGNIFICANT CHANGE UP
PH UR: 6.5 — SIGNIFICANT CHANGE UP (ref 5–8)
PLAT MORPH BLD: NORMAL — SIGNIFICANT CHANGE UP
PLATELET # BLD AUTO: 543 K/UL — HIGH (ref 150–400)
POIKILOCYTOSIS BLD QL AUTO: SIGNIFICANT CHANGE UP
POLYCHROMASIA BLD QL SMEAR: SIGNIFICANT CHANGE UP
POTASSIUM SERPL-MCNC: 3.7 MMOL/L — SIGNIFICANT CHANGE UP (ref 3.5–5.3)
POTASSIUM SERPL-SCNC: 3.7 MMOL/L — SIGNIFICANT CHANGE UP (ref 3.5–5.3)
PROT SERPL-MCNC: 7.7 G/DL — SIGNIFICANT CHANGE UP (ref 6.6–8.7)
PROT UR-MCNC: 15 MG/DL
RBC # BLD: 3.91 M/UL — SIGNIFICANT CHANGE UP (ref 3.8–5.2)
RBC # FLD: 17.5 % — HIGH (ref 10.3–14.5)
RBC BLD AUTO: ABNORMAL
SCHISTOCYTES BLD QL AUTO: SLIGHT — SIGNIFICANT CHANGE UP
SODIUM SERPL-SCNC: 134 MMOL/L — LOW (ref 135–145)
SP GR SPEC: 1.01 — SIGNIFICANT CHANGE UP (ref 1.01–1.02)
TROPONIN T SERPL-MCNC: <0.01 NG/ML — SIGNIFICANT CHANGE UP (ref 0–0.06)
UROBILINOGEN FLD QL: 1 MG/DL
WBC # BLD: 9.56 K/UL — SIGNIFICANT CHANGE UP (ref 3.8–10.5)
WBC # FLD AUTO: 9.56 K/UL — SIGNIFICANT CHANGE UP (ref 3.8–10.5)

## 2020-08-11 PROCEDURE — 81001 URINALYSIS AUTO W/SCOPE: CPT

## 2020-08-11 PROCEDURE — 81025 URINE PREGNANCY TEST: CPT

## 2020-08-11 PROCEDURE — 93005 ELECTROCARDIOGRAM TRACING: CPT

## 2020-08-11 PROCEDURE — 83880 ASSAY OF NATRIURETIC PEPTIDE: CPT

## 2020-08-11 PROCEDURE — 99285 EMERGENCY DEPT VISIT HI MDM: CPT

## 2020-08-11 PROCEDURE — 80053 COMPREHEN METABOLIC PANEL: CPT

## 2020-08-11 PROCEDURE — 36415 COLL VENOUS BLD VENIPUNCTURE: CPT

## 2020-08-11 PROCEDURE — 85027 COMPLETE CBC AUTOMATED: CPT

## 2020-08-11 PROCEDURE — 85379 FIBRIN DEGRADATION QUANT: CPT

## 2020-08-11 PROCEDURE — 84484 ASSAY OF TROPONIN QUANT: CPT

## 2020-08-11 PROCEDURE — 93970 EXTREMITY STUDY: CPT | Mod: 26

## 2020-08-11 PROCEDURE — 93010 ELECTROCARDIOGRAM REPORT: CPT

## 2020-08-11 PROCEDURE — 99284 EMERGENCY DEPT VISIT MOD MDM: CPT | Mod: 25

## 2020-08-11 PROCEDURE — 93970 EXTREMITY STUDY: CPT

## 2020-08-11 NOTE — ED ADULT NURSE NOTE - OBJECTIVE STATEMENT
Pt 19 weeks pregnant c/o chest pressure x 1 week, was sent to ED from OBGYN, resp even and unlabored, denies n/v, also c/o lower abdominal pain, hx of fibroids which have gotten bigger during pregnancy, started taking 81 mg ASA recently, denies radiation of pain, AOx3, first pregnancy

## 2020-08-11 NOTE — ED PROVIDER NOTE - PATIENT PORTAL LINK FT
You can access the FollowMyHealth Patient Portal offered by [facility] by registering at the following website: [email or web address]. By joining [facility]’s FollowMyHealth portal, you will also be able to view your health information using other applications (apps) compatible with our system.

## 2020-08-11 NOTE — ED PROVIDER NOTE - CLINICAL SUMMARY MEDICAL DECISION MAKING FREE TEXT BOX
20F  19wks high risk due to fibroids, with hx of asthma and anemia, presents to ED for 1 week of worsening intermittent central chest pressure, not worse with position or breathing, with palpitations and  accompanying SOB. Workup unremarkable including DDIMER < 500,normal LE US, Labs, EKG. Had thorough discussion how PE cannot be completely ruled out without CTA chest and advised in favor of imaging, discussed risks and benefit to her and fetus, patient would like to decline for now and discuss with OB tomorrow.  Patient is feeling improved, nml EKG, normocardic saturating at 100%. Patient advised to follow up with her OB tomorrow. Return precautions given.    All questions answered and patient agreeable to the plan.

## 2020-08-11 NOTE — ED PROVIDER NOTE - NS ED ROS FT
General: No fever, no chills  HEENT: No sensory changes, no sore throat  Neck: No neck pain, no stiffness  Resp: +SOB, Possible hemoptysis  Cardiovascular: +CP, No LOC  GI: No abdominal pain, No blood in stool  : No dysuria, no hematuria   MSK: No back pain, no limb pain  Neuro: No HA, No focal deficits

## 2020-08-11 NOTE — ED PROVIDER NOTE - PHYSICAL EXAMINATION
General: NAD, well appearing  HEENT: Normocephalic, EOM intact  Neck: No apparent stiffness or JVD  Pulm: Chest wall symmetric and nontender, lungs clear to ascultation   Cardiac: Regular rate and rhythm  Abdomen: Nontender and appropriately gravid  Skin: Skin in warm, dry and intact without rashes or lesions.  Neuro: No apparent motor or sensory deficits  Psych: Alert, oriented, and cooperative

## 2020-08-11 NOTE — ED PROVIDER NOTE - OBJECTIVE STATEMENT
20F  19wks high risk due to fibroids, with hx of asthma and anemia, presents to ED for 1 week of worsening intermittent central chest pressure, not worse with position or breathing, with palpitations and  accompanying SOB.  Episodes last a few minutes at a time.  She also may have had some tinged sputum this week.  She has some lower abdominal pain that she called "round ligament pain" from her OB, Dr. Avina. She has also had mild nausea and LE swelling with her pregnancy.  She has not had issues with GERD.  She has had childhood asthma but she states this feels different.   She takes a prenatal and baby aspirin prescribed by OB per patient.   Otherwise denies pain, bleeding, SOB, chest pain, abdominal pain or fevers.  Denies history of clotting. Denies other pertinent medical problems.  Denies any overt substance use.

## 2020-08-11 NOTE — ED ADULT TRIAGE NOTE - CHIEF COMPLAINT QUOTE
chest pain with SOB non radiating. pt reports being 19 weeks pregnant, pt states " baby feels fine". pt ambulatory

## 2020-08-11 NOTE — ED PROVIDER NOTE - ATTENDING CONTRIBUTION TO CARE
21 yo female  at 19 weeks (patient reports high risk due to fibroids) c/o chest pain x 1 week, with occasional palpitations  patient sent by obgyn for evaluation  no hx dvt/pe  nad  cta b/l, [id number]  abd soft, +gravid abdomen, non tender  no pedal edema/calf tenderness    ekg, labs

## 2020-08-19 ENCOUNTER — APPOINTMENT (OUTPATIENT)
Dept: ANTEPARTUM | Facility: CLINIC | Age: 20
End: 2020-08-19
Payer: MEDICAID

## 2020-08-19 ENCOUNTER — ASOB RESULT (OUTPATIENT)
Age: 20
End: 2020-08-19

## 2020-08-19 PROCEDURE — 76805 OB US >/= 14 WKS SNGL FETUS: CPT

## 2020-08-24 LAB
1ST TRIMESTER DATA: NORMAL
2ND TRIMESTER DATA: NORMAL
ADDENDUM DOC: NORMAL
AFP PNL SERPL: NORMAL
AFP SERPL-ACNC: NORMAL
AFP SERPL-ACNC: NORMAL
B-HCG FREE SERPL-MCNC: NORMAL
CLINICAL BIOCHEMIST REVIEW: NORMAL
FREE BETA HCG 1ST TRIMESTER: NORMAL
INHIBIN A SERPL-MCNC: NORMAL
NASAL BONE: PRESENT
NOTES NTD: NORMAL
NT: NORMAL
PAPP-A SERPL-ACNC: NORMAL
U ESTRIOL SERPL-SCNC: NORMAL

## 2020-08-26 ENCOUNTER — APPOINTMENT (OUTPATIENT)
Dept: OBGYN | Facility: CLINIC | Age: 20
End: 2020-08-26
Payer: MEDICAID

## 2020-08-26 ENCOUNTER — NON-APPOINTMENT (OUTPATIENT)
Age: 20
End: 2020-08-26

## 2020-08-26 VITALS
WEIGHT: 154.38 LBS | DIASTOLIC BLOOD PRESSURE: 70 MMHG | SYSTOLIC BLOOD PRESSURE: 114 MMHG | BODY MASS INDEX: 26.36 KG/M2 | HEIGHT: 64 IN

## 2020-08-26 PROCEDURE — 99213 OFFICE O/P EST LOW 20 MIN: CPT | Mod: TH

## 2020-09-01 ENCOUNTER — OUTPATIENT (OUTPATIENT)
Dept: OUTPATIENT SERVICES | Facility: HOSPITAL | Age: 20
LOS: 1 days | End: 2020-09-01
Payer: COMMERCIAL

## 2020-09-01 VITALS — DIASTOLIC BLOOD PRESSURE: 61 MMHG | HEART RATE: 93 BPM | SYSTOLIC BLOOD PRESSURE: 123 MMHG

## 2020-09-01 VITALS
DIASTOLIC BLOOD PRESSURE: 67 MMHG | HEART RATE: 100 BPM | RESPIRATION RATE: 22 BRPM | SYSTOLIC BLOOD PRESSURE: 116 MMHG | TEMPERATURE: 98 F

## 2020-09-01 DIAGNOSIS — O47.02 FALSE LABOR BEFORE 37 COMPLETED WEEKS OF GESTATION, SECOND TRIMESTER: ICD-10-CM

## 2020-09-01 LAB
ALBUMIN SERPL ELPH-MCNC: 3.5 G/DL — SIGNIFICANT CHANGE UP (ref 3.3–5.2)
ALP SERPL-CCNC: 47 U/L — SIGNIFICANT CHANGE UP (ref 40–120)
ALT FLD-CCNC: 11 U/L — SIGNIFICANT CHANGE UP
ANION GAP SERPL CALC-SCNC: 11 MMOL/L — SIGNIFICANT CHANGE UP (ref 5–17)
APPEARANCE UR: CLEAR — SIGNIFICANT CHANGE UP
AST SERPL-CCNC: 20 U/L — SIGNIFICANT CHANGE UP
BACTERIA # UR AUTO: ABNORMAL
BILIRUB SERPL-MCNC: 0.2 MG/DL — LOW (ref 0.4–2)
BILIRUB UR-MCNC: NEGATIVE — SIGNIFICANT CHANGE UP
BUN SERPL-MCNC: 7 MG/DL — LOW (ref 8–20)
CALCIUM SERPL-MCNC: 9.5 MG/DL — SIGNIFICANT CHANGE UP (ref 8.6–10.2)
CHLORIDE SERPL-SCNC: 103 MMOL/L — SIGNIFICANT CHANGE UP (ref 98–107)
CO2 SERPL-SCNC: 22 MMOL/L — SIGNIFICANT CHANGE UP (ref 22–29)
COLOR SPEC: YELLOW — SIGNIFICANT CHANGE UP
CREAT SERPL-MCNC: 0.45 MG/DL — LOW (ref 0.5–1.3)
DIFF PNL FLD: ABNORMAL
EPI CELLS # UR: ABNORMAL
GLUCOSE SERPL-MCNC: 92 MG/DL — SIGNIFICANT CHANGE UP (ref 70–99)
GLUCOSE UR QL: NEGATIVE MG/DL — SIGNIFICANT CHANGE UP
HCT VFR BLD CALC: 27.6 % — LOW (ref 34.5–45)
HGB BLD-MCNC: 8 G/DL — LOW (ref 11.5–15.5)
KETONES UR-MCNC: ABNORMAL
LEUKOCYTE ESTERASE UR-ACNC: ABNORMAL
MCHC RBC-ENTMCNC: 19.9 PG — LOW (ref 27–34)
MCHC RBC-ENTMCNC: 29 GM/DL — LOW (ref 32–36)
MCV RBC AUTO: 68.7 FL — LOW (ref 80–100)
NITRITE UR-MCNC: NEGATIVE — SIGNIFICANT CHANGE UP
PH UR: 6 — SIGNIFICANT CHANGE UP (ref 5–8)
PLATELET # BLD AUTO: 539 K/UL — HIGH (ref 150–400)
POTASSIUM SERPL-MCNC: 3.8 MMOL/L — SIGNIFICANT CHANGE UP (ref 3.5–5.3)
POTASSIUM SERPL-SCNC: 3.8 MMOL/L — SIGNIFICANT CHANGE UP (ref 3.5–5.3)
PROT SERPL-MCNC: 7.3 G/DL — SIGNIFICANT CHANGE UP (ref 6.6–8.7)
PROT UR-MCNC: 30 MG/DL
RBC # BLD: 4.02 M/UL — SIGNIFICANT CHANGE UP (ref 3.8–5.2)
RBC # FLD: 17.6 % — HIGH (ref 10.3–14.5)
RBC CASTS # UR COMP ASSIST: ABNORMAL /HPF (ref 0–4)
SODIUM SERPL-SCNC: 136 MMOL/L — SIGNIFICANT CHANGE UP (ref 135–145)
SP GR SPEC: 1.02 — SIGNIFICANT CHANGE UP (ref 1.01–1.02)
UROBILINOGEN FLD QL: NEGATIVE MG/DL — SIGNIFICANT CHANGE UP
WBC # BLD: 11.84 K/UL — HIGH (ref 3.8–10.5)
WBC # FLD AUTO: 11.84 K/UL — HIGH (ref 3.8–10.5)
WBC UR QL: SIGNIFICANT CHANGE UP

## 2020-09-01 PROCEDURE — G0463: CPT

## 2020-09-01 PROCEDURE — 81001 URINALYSIS AUTO W/SCOPE: CPT

## 2020-09-01 PROCEDURE — 85027 COMPLETE CBC AUTOMATED: CPT

## 2020-09-01 PROCEDURE — 59025 FETAL NON-STRESS TEST: CPT

## 2020-09-01 PROCEDURE — 80053 COMPREHEN METABOLIC PANEL: CPT

## 2020-09-01 PROCEDURE — 59050 FETAL MONITOR W/REPORT: CPT

## 2020-09-01 PROCEDURE — 36415 COLL VENOUS BLD VENIPUNCTURE: CPT

## 2020-09-01 RX ORDER — SODIUM CHLORIDE 9 MG/ML
1000 INJECTION, SOLUTION INTRAVENOUS
Refills: 0 | Status: COMPLETED | OUTPATIENT
Start: 2020-09-01 | End: 2020-09-01

## 2020-09-01 RX ORDER — INDOMETHACIN 50 MG
50 CAPSULE ORAL ONCE
Refills: 0 | Status: COMPLETED | OUTPATIENT
Start: 2020-09-01 | End: 2020-09-01

## 2020-09-01 RX ORDER — INDOMETHACIN 50 MG
1 CAPSULE ORAL
Qty: 4 | Refills: 0
Start: 2020-09-01 | End: 2020-09-01

## 2020-09-01 RX ORDER — ACETAMINOPHEN 500 MG
1000 TABLET ORAL ONCE
Refills: 0 | Status: COMPLETED | OUTPATIENT
Start: 2020-09-01 | End: 2020-09-01

## 2020-09-01 RX ADMIN — Medication 1000 MILLIGRAM(S): at 18:40

## 2020-09-01 RX ADMIN — Medication 50 MILLIGRAM(S): at 19:22

## 2020-09-01 RX ADMIN — Medication 400 MILLIGRAM(S): at 18:10

## 2020-09-01 RX ADMIN — SODIUM CHLORIDE 999 MILLILITER(S): 9 INJECTION, SOLUTION INTRAVENOUS at 18:30

## 2020-09-01 RX ADMIN — Medication 50 MILLIGRAM(S): at 20:12

## 2020-09-01 NOTE — OB RN PATIENT PROFILE - BP NONINVASIVE DIASTOLIC (MM HG)
CC:  Lita Curtis is here today for Complete physical.    Medications: medications verified and updated  Refills needed today?  NO  denies Latex allergy or sensitivity  Tobacco history: verified    PMD - Angela Sosa MD      Patient would like communication of their results via:    [facility]    Patient's current [facility] status: Active.  Health Maintenance   Topic Date Due   • Diabetes A1C  06/10/2017   • Diabetes Foot Exam  06/15/2017   • Diabetes Eye Exam  11/07/2017   • Diabetes Urine Microalbumin  12/10/2017   • Diabetes GFR  12/10/2017   • Td/TDaP Vaccine  06/17/2018   • Cervical Cancer Screening HPV CO-Testing  12/10/2018   • Breast Cancer Screening  12/13/2018   • Colorectal Cancer Screening-Colonoscopy  12/18/2024   • Zoster Vaccine  Completed   • Influenza Vaccine  Completed        55

## 2020-09-01 NOTE — OB PROVIDER TRIAGE NOTE - NSOBPROVIDERNOTE_OBGYN_ALL_OB_FT
Patient seen and examined with resident - pain likely related to fibroid.  Will check labs to rule out other etiologies.    Abdomen: soft Tender to palpation no rebound/guarding   Signed out to oncoming attending   Lana Perez MD

## 2020-09-01 NOTE — OB PROVIDER TRIAGE NOTE - HISTORY OF PRESENT ILLNESS
Pt is a 19yo G1 at 22w1d presented to L&D triage for generalized abdominal pain that started and gotten worse over last 4 days, Patient has a known history of fibroid uterus. SHe denies localized pain to any quadrant, states that the pain is constant in nature and recently got to the pont that she has difficulty walking. Pt has never experienced similar pain in the past.   SHe reports uncomplicated prenatal course to date. denies vaginal bleeding, loss of fluids, headaches, blood pressure issues. Denies contractions.    Pt is in distress due to pain, AVSS  FH appreciated, 140s  TOCO: painless contractions every 2-3 min  SVE: closed long posterior  Bedside sono: vertex, anterior placenta, normal COOPER  TVUS: cervical length 3.14 on the shortest measurement.    Plan:  CBC, CMP  UA  IV tylenol for pain  IV hydration with 1L lr bolus  consider Indocin for relieving pain secondary to fibroid uterus  reassess as indicated.    Dr. Perez aware and agrees with the plan

## 2020-09-16 ENCOUNTER — APPOINTMENT (OUTPATIENT)
Dept: OBGYN | Facility: CLINIC | Age: 20
End: 2020-09-16
Payer: MEDICAID

## 2020-09-16 ENCOUNTER — NON-APPOINTMENT (OUTPATIENT)
Age: 20
End: 2020-09-16

## 2020-09-16 VITALS
WEIGHT: 159.19 LBS | BODY MASS INDEX: 27.18 KG/M2 | DIASTOLIC BLOOD PRESSURE: 60 MMHG | SYSTOLIC BLOOD PRESSURE: 100 MMHG | HEIGHT: 64 IN

## 2020-09-16 PROCEDURE — 99213 OFFICE O/P EST LOW 20 MIN: CPT | Mod: TH

## 2020-09-17 ENCOUNTER — LABORATORY RESULT (OUTPATIENT)
Age: 20
End: 2020-09-17

## 2020-09-18 ENCOUNTER — OUTPATIENT (OUTPATIENT)
Dept: OUTPATIENT SERVICES | Facility: HOSPITAL | Age: 20
LOS: 1 days | End: 2020-09-18
Payer: COMMERCIAL

## 2020-09-18 VITALS
HEART RATE: 111 BPM | TEMPERATURE: 99 F | DIASTOLIC BLOOD PRESSURE: 62 MMHG | OXYGEN SATURATION: 100 % | RESPIRATION RATE: 20 BRPM | SYSTOLIC BLOOD PRESSURE: 107 MMHG

## 2020-09-18 VITALS
HEART RATE: 82 BPM | RESPIRATION RATE: 18 BRPM | SYSTOLIC BLOOD PRESSURE: 111 MMHG | DIASTOLIC BLOOD PRESSURE: 61 MMHG | TEMPERATURE: 98 F

## 2020-09-18 DIAGNOSIS — O47.02 FALSE LABOR BEFORE 37 COMPLETED WEEKS OF GESTATION, SECOND TRIMESTER: ICD-10-CM

## 2020-09-18 LAB
APPEARANCE UR: CLEAR — SIGNIFICANT CHANGE UP
BACTERIA # UR AUTO: NEGATIVE — SIGNIFICANT CHANGE UP
BASOPHILS # BLD AUTO: 0.08 K/UL
BASOPHILS NFR BLD AUTO: 0.7 %
BILIRUB UR-MCNC: NEGATIVE — SIGNIFICANT CHANGE UP
COLOR SPEC: YELLOW — SIGNIFICANT CHANGE UP
DIFF PNL FLD: NEGATIVE — SIGNIFICANT CHANGE UP
EOSINOPHIL # BLD AUTO: 0.29 K/UL
EOSINOPHIL NFR BLD AUTO: 2.7 %
EPI CELLS # UR: SIGNIFICANT CHANGE UP
FIBRONECTIN FETAL SPEC QL: NEGATIVE — SIGNIFICANT CHANGE UP
GLUCOSE 1H P 50 G GLC PO SERPL-MCNC: 114 MG/DL
GLUCOSE UR QL: NEGATIVE MG/DL — SIGNIFICANT CHANGE UP
HCT VFR BLD CALC: 26.1 %
HGB BLD-MCNC: 7.3 G/DL
IMM GRANULOCYTES NFR BLD AUTO: 0.4 %
KETONES UR-MCNC: NEGATIVE — SIGNIFICANT CHANGE UP
LEUKOCYTE ESTERASE UR-ACNC: ABNORMAL
LYMPHOCYTES # BLD AUTO: 2.38 K/UL
LYMPHOCYTES NFR BLD AUTO: 22.1 %
MAN DIFF?: NORMAL
MCHC RBC-ENTMCNC: 19.8 PG
MCHC RBC-ENTMCNC: 28 GM/DL
MCV RBC AUTO: 70.7 FL
MONOCYTES # BLD AUTO: 0.66 K/UL
MONOCYTES NFR BLD AUTO: 6.1 %
NEUTROPHILS # BLD AUTO: 7.31 K/UL
NEUTROPHILS NFR BLD AUTO: 68 %
NITRITE UR-MCNC: NEGATIVE — SIGNIFICANT CHANGE UP
PH UR: 8 — SIGNIFICANT CHANGE UP (ref 5–8)
PLATELET # BLD AUTO: 573 K/UL
PROT UR-MCNC: NEGATIVE MG/DL — SIGNIFICANT CHANGE UP
RBC # BLD: 3.69 M/UL
RBC # FLD: 19.6 %
RBC CASTS # UR COMP ASSIST: NEGATIVE /HPF — SIGNIFICANT CHANGE UP (ref 0–4)
SP GR SPEC: 1.01 — SIGNIFICANT CHANGE UP (ref 1.01–1.02)
UROBILINOGEN FLD QL: NEGATIVE MG/DL — SIGNIFICANT CHANGE UP
WBC # FLD AUTO: 10.76 K/UL
WBC UR QL: SIGNIFICANT CHANGE UP

## 2020-09-18 PROCEDURE — 81001 URINALYSIS AUTO W/SCOPE: CPT

## 2020-09-18 PROCEDURE — 87591 N.GONORRHOEAE DNA AMP PROB: CPT

## 2020-09-18 PROCEDURE — 36415 COLL VENOUS BLD VENIPUNCTURE: CPT

## 2020-09-18 PROCEDURE — 59025 FETAL NON-STRESS TEST: CPT

## 2020-09-18 PROCEDURE — 87086 URINE CULTURE/COLONY COUNT: CPT

## 2020-09-18 PROCEDURE — 87800 DETECT AGNT MULT DNA DIREC: CPT

## 2020-09-18 PROCEDURE — 87491 CHLMYD TRACH DNA AMP PROBE: CPT

## 2020-09-18 PROCEDURE — 82731 ASSAY OF FETAL FIBRONECTIN: CPT

## 2020-09-18 PROCEDURE — G0463: CPT

## 2020-09-18 RX ORDER — FERROUS GLUCONATE 100 %
1 POWDER (GRAM) MISCELLANEOUS
Qty: 30 | Refills: 1
Start: 2020-09-18 | End: 2020-11-16

## 2020-09-18 RX ORDER — ACETAMINOPHEN 500 MG
1000 TABLET ORAL ONCE
Refills: 0 | Status: COMPLETED | OUTPATIENT
Start: 2020-09-18 | End: 2020-09-18

## 2020-09-18 RX ORDER — SODIUM CHLORIDE 9 MG/ML
1000 INJECTION, SOLUTION INTRAVENOUS ONCE
Refills: 0 | Status: COMPLETED | OUTPATIENT
Start: 2020-09-18 | End: 2020-09-18

## 2020-09-18 RX ADMIN — SODIUM CHLORIDE 1000 MILLILITER(S): 9 INJECTION, SOLUTION INTRAVENOUS at 14:20

## 2020-09-18 RX ADMIN — Medication 1000 MILLIGRAM(S): at 16:10

## 2020-09-18 RX ADMIN — Medication 400 MILLIGRAM(S): at 15:40

## 2020-09-18 NOTE — OB PROVIDER TRIAGE NOTE - HISTORY OF PRESENT ILLNESS
Pam Joy is a 20 y.o.  at 24 weeks 4 day gestation who presents via EMS to L&D for concerns for an episode of lightheadedness.   She says earlier today she got up too fast, and she lost vision in her left eye and hearing temporarily. She felt dizzy. She sat down and drank some water, and although her vision and hearing came back, she had a headache.   She notes having urinary frequency that's been worse the past couple of days, denies pain upon urination.  This pregnancy is complicated by   - low MARGARITA-A  - LGSIL  - Anemia  - Fibroids    PMH: childhood asthma, migraines, anemia  PSH: none  Meds: PNV  Allergies: morphine (headache, N/V)      Vital Signs Last 24 Hrs  T(C): 37 (18 Sep 2020 13:01), Max: 37 (18 Sep 2020 13:01)  T(F): 98.6 (18 Sep 2020 13:01), Max: 98.6 (18 Sep 2020 13:01)  HR: 84 (18 Sep 2020 14:16) (84 - 111)  BP: 109/67 (18 Sep 2020 13:31) (107/62 - 112/68)  RR: 20 (18 Sep 2020 13:01) (20 - 20)  SpO2: 87% (18 Sep 2020 14:16) (87% - 100%)    FHT: 140 baseline/ reactive  [place]: uterine irritability, irregular contractions    Speculum: physiologic discharge, no vaginal bleeding, cervix appears closed  SVE: 0/0/-3

## 2020-09-18 NOTE — OB PROVIDER TRIAGE NOTE - NSOBPROVIDERNOTE_OBGYN_ALL_OB_FT
22 y.o.  at 24 weeks 4 days gestation evaluated for contractions. FFN, BV, gonorrhea & chlamydia, UA and urine culture pending.    Discussed with Dr. Avina and Dr. Middleton.

## 2020-09-18 NOTE — OB RN TRIAGE NOTE - CHIEF COMPLAINT QUOTE
pt C/ O H/A, shortness of breath, Nausea, at present  dizzy spell with loss of vision for 10-15 sec at home

## 2020-09-19 LAB
C TRACH RRNA SPEC QL NAA+PROBE: SIGNIFICANT CHANGE UP
CANDIDA AB TITR SER: DETECTED
G VAGINALIS DNA SPEC QL NAA+PROBE: DETECTED
N GONORRHOEA RRNA SPEC QL NAA+PROBE: SIGNIFICANT CHANGE UP
SPECIMEN SOURCE: SIGNIFICANT CHANGE UP
T VAGINALIS SPEC QL WET PREP: SIGNIFICANT CHANGE UP

## 2020-09-20 LAB
CULTURE RESULTS: SIGNIFICANT CHANGE UP
SPECIMEN SOURCE: SIGNIFICANT CHANGE UP

## 2020-10-07 ENCOUNTER — APPOINTMENT (OUTPATIENT)
Dept: OBGYN | Facility: CLINIC | Age: 20
End: 2020-10-07
Payer: MEDICAID

## 2020-10-07 ENCOUNTER — NON-APPOINTMENT (OUTPATIENT)
Age: 20
End: 2020-10-07

## 2020-10-07 VITALS
BODY MASS INDEX: 27.38 KG/M2 | WEIGHT: 160.38 LBS | HEIGHT: 64 IN | DIASTOLIC BLOOD PRESSURE: 70 MMHG | SYSTOLIC BLOOD PRESSURE: 108 MMHG

## 2020-10-07 PROCEDURE — 99213 OFFICE O/P EST LOW 20 MIN: CPT | Mod: TH

## 2020-10-17 ENCOUNTER — OUTPATIENT (OUTPATIENT)
Dept: OUTPATIENT SERVICES | Facility: HOSPITAL | Age: 20
LOS: 1 days | End: 2020-10-17
Payer: COMMERCIAL

## 2020-10-17 VITALS
RESPIRATION RATE: 18 BRPM | TEMPERATURE: 98 F | HEART RATE: 96 BPM | SYSTOLIC BLOOD PRESSURE: 107 MMHG | DIASTOLIC BLOOD PRESSURE: 65 MMHG

## 2020-10-17 VITALS — HEART RATE: 78 BPM | SYSTOLIC BLOOD PRESSURE: 109 MMHG | DIASTOLIC BLOOD PRESSURE: 68 MMHG

## 2020-10-17 DIAGNOSIS — O47.02 FALSE LABOR BEFORE 37 COMPLETED WEEKS OF GESTATION, SECOND TRIMESTER: ICD-10-CM

## 2020-10-17 LAB
APPEARANCE UR: CLEAR — SIGNIFICANT CHANGE UP
BACTERIA # UR AUTO: NEGATIVE — SIGNIFICANT CHANGE UP
BILIRUB UR-MCNC: NEGATIVE — SIGNIFICANT CHANGE UP
COLOR SPEC: YELLOW — SIGNIFICANT CHANGE UP
DIFF PNL FLD: NEGATIVE — SIGNIFICANT CHANGE UP
EPI CELLS # UR: SIGNIFICANT CHANGE UP
GLUCOSE UR QL: NEGATIVE MG/DL — SIGNIFICANT CHANGE UP
KETONES UR-MCNC: NEGATIVE — SIGNIFICANT CHANGE UP
LEUKOCYTE ESTERASE UR-ACNC: ABNORMAL
NITRITE UR-MCNC: NEGATIVE — SIGNIFICANT CHANGE UP
PH UR: 8 — SIGNIFICANT CHANGE UP (ref 5–8)
PROT UR-MCNC: NEGATIVE MG/DL — SIGNIFICANT CHANGE UP
RBC CASTS # UR COMP ASSIST: NEGATIVE /HPF — SIGNIFICANT CHANGE UP (ref 0–4)
SP GR SPEC: 1.01 — SIGNIFICANT CHANGE UP (ref 1.01–1.02)
UROBILINOGEN FLD QL: 4 MG/DL
WBC UR QL: SIGNIFICANT CHANGE UP

## 2020-10-17 PROCEDURE — 87800 DETECT AGNT MULT DNA DIREC: CPT

## 2020-10-17 PROCEDURE — G0463: CPT

## 2020-10-17 PROCEDURE — 59025 FETAL NON-STRESS TEST: CPT

## 2020-10-17 PROCEDURE — 36415 COLL VENOUS BLD VENIPUNCTURE: CPT

## 2020-10-17 PROCEDURE — 81001 URINALYSIS AUTO W/SCOPE: CPT

## 2020-10-17 RX ORDER — SODIUM CHLORIDE 9 MG/ML
1000 INJECTION, SOLUTION INTRAVENOUS ONCE
Refills: 0 | Status: DISCONTINUED | OUTPATIENT
Start: 2020-10-17 | End: 2020-11-01

## 2020-10-17 RX ORDER — ACETAMINOPHEN 500 MG
1000 TABLET ORAL ONCE
Refills: 0 | Status: DISCONTINUED | OUTPATIENT
Start: 2020-10-17 | End: 2020-11-01

## 2020-10-17 NOTE — OB PROVIDER TRIAGE NOTE - NSOBPROVIDERNOTE_OBGYN_ALL_OB_FT
20 y.o.  at 28 weeks 5 days gestation evaluated for abdominal cramping, likely to be due to degenerating fibroids. Patient is discharged with  labor instructions and pain management instructions.    Discussed with Dr. Mayorga

## 2020-10-17 NOTE — OB RN TRIAGE NOTE - NS_ARRIVALFROM_OBGYN_ALL_OB
"Refer To    ORTHOPEDICS REFER TO:  [address] [facility], [address], [address]   [address] [facility]; [address] [facility] [place] [facility] Office [phone] fax [phone]   Cisco Zhang MD; [address] [facility] [phone] fax [phone]   Referral for treatment   Reason for Referral: Osteoarthritis of both knees M17.0; Tear of lateral meniscus of right knee, unspecified tear type, unspecified whether old or current tear, sequela S83.281S Rt. shoulder rotator cuff tendonitis M75.81  # of Visits: 3     Notes  f/u after MRI: swelling improved but still has pain and stiffness in rt. knee, has popping sound when stooping. Has pain and ""locking\"" in rt. knee, pain with abduction above head.            Signatures   Electronically signed by : Kiara Cotton MD; Nov 27 2018  9:05AM CST    " Home

## 2020-10-17 NOTE — OB RN TRIAGE NOTE - NS_TRIAGEADDITIONAL COMMENTS_OBGYN_ALL_OB_FT
pt to be discharged as per MD. Patient to remain out of work for the next week. To follow up with OBGYN at next appointment on 10/27/2020

## 2020-10-17 NOTE — OB RN TRIAGE NOTE - NSNURSINGINSTR_OBGYN_ALL_OB_FT
Patient to remain out of work for the next week. To follow up with OBGYN at next appointment on 10/27/2020

## 2020-10-17 NOTE — OB PROVIDER TRIAGE NOTE - HISTORY OF PRESENT ILLNESS
Pam Joy is a 20 y.o.  at 28 weeks 5 days gestation who presents to L&D for concern of contractions.    This pregnancy is complicated by:  - iron deficiency anemia  - low MARGARITA-A    GynHx: LGSIL on pap  PMH: iron deficiency anemia, asthma, migraine  PSH: denies  Meds: PNV, iron, zofran, aspirin  All: morphine  ABO: A positive  GBS: unknown Pam Joy is a 20 y.o.  at 28 weeks 5 days gestation who presents to L&D for concern for abdominal cramping.      This pregnancy is complicated by:  - iron deficiency anemia  - low MARGARITA-A    GynHx: LGSIL on pap  PMH: iron deficiency anemia, asthma, migraine  PSH: denies  Meds: PNV, iron, zofran, aspirin  All: morphine  ABO: A positive  GBS: unknown Pam Joy is a 20 y.o.  at 28 weeks 5 days gestation who presents to L&D for concern for abdominal cramping.  She reports having vaginal spotting for approximately 20 minutes today. She noted some blood on the toilet paper when wiping in the bathroom. She reports abdominal cramping above her pubic bone and on her left side that feels like a sharp, stabbing pain. It continued to worsen which brought her into L&D.  She reports good fetal movement and denies loss of fluid.     This pregnancy is complicated by:  - iron deficiency anemia  - low MARGARITA-A  - fibroids    GynHx: LGSIL on pap, fibroids  PMH: iron deficiency anemia, asthma, migraine  PSH: denies  Meds: PNV, iron, zofran, aspirin  All: morphine  ABO: A positive  GBS: unknown    Vital Signs Last 24 Hrs  T(C): 36.7 (17 Oct 2020 14:08), Max: 36.7 (17 Oct 2020 14:08)  T(F): 98.06 (17 Oct 2020 14:08), Max: 98.1 (17 Oct 2020 14:08)  HR: 78 (17 Oct 2020 16:01) (78 - 96)  BP: 109/68 (17 Oct 2020 16:01) (107/65 - 109/68)  RR: 18 (17 Oct 2020 14:08) (18 - 18)    Gen: NAD  Pulm: CTAB  Card: RRR    SVE: 0/0/-3  Speculum: no vaginal bleeding or pooling, white vaginal discharge  FHT: 135 baseline, reactive  [place]: uterine irritability

## 2020-10-18 LAB
CANDIDA AB TITR SER: DETECTED
G VAGINALIS DNA SPEC QL NAA+PROBE: DETECTED
T VAGINALIS SPEC QL WET PREP: SIGNIFICANT CHANGE UP

## 2020-10-22 ENCOUNTER — NON-APPOINTMENT (OUTPATIENT)
Age: 20
End: 2020-10-22

## 2020-10-27 ENCOUNTER — NON-APPOINTMENT (OUTPATIENT)
Age: 20
End: 2020-10-27

## 2020-10-27 ENCOUNTER — APPOINTMENT (OUTPATIENT)
Dept: OBGYN | Facility: CLINIC | Age: 20
End: 2020-10-27
Payer: MEDICAID

## 2020-10-27 ENCOUNTER — ASOB RESULT (OUTPATIENT)
Age: 20
End: 2020-10-27

## 2020-10-27 ENCOUNTER — APPOINTMENT (OUTPATIENT)
Dept: ANTEPARTUM | Facility: CLINIC | Age: 20
End: 2020-10-27
Payer: MEDICAID

## 2020-10-27 VITALS
TEMPERATURE: 97 F | WEIGHT: 165 LBS | HEIGHT: 64 IN | BODY MASS INDEX: 28.17 KG/M2 | DIASTOLIC BLOOD PRESSURE: 60 MMHG | SYSTOLIC BLOOD PRESSURE: 100 MMHG

## 2020-10-27 PROCEDURE — 93976 VASCULAR STUDY: CPT

## 2020-10-27 PROCEDURE — 76820 UMBILICAL ARTERY ECHO: CPT

## 2020-10-27 PROCEDURE — 76819 FETAL BIOPHYS PROFIL W/O NST: CPT

## 2020-10-27 PROCEDURE — 99213 OFFICE O/P EST LOW 20 MIN: CPT | Mod: TH

## 2020-10-27 PROCEDURE — 76816 OB US FOLLOW-UP PER FETUS: CPT

## 2020-10-27 PROCEDURE — 99072 ADDL SUPL MATRL&STAF TM PHE: CPT

## 2020-11-06 ENCOUNTER — OUTPATIENT (OUTPATIENT)
Dept: INPATIENT UNIT | Facility: HOSPITAL | Age: 20
LOS: 1 days | End: 2020-11-06
Payer: COMMERCIAL

## 2020-11-06 VITALS — SYSTOLIC BLOOD PRESSURE: 118 MMHG | HEART RATE: 85 BPM | DIASTOLIC BLOOD PRESSURE: 59 MMHG

## 2020-11-06 VITALS
HEART RATE: 96 BPM | TEMPERATURE: 99 F | DIASTOLIC BLOOD PRESSURE: 62 MMHG | SYSTOLIC BLOOD PRESSURE: 120 MMHG | RESPIRATION RATE: 18 BRPM

## 2020-11-06 DIAGNOSIS — O47.03 FALSE LABOR BEFORE 37 COMPLETED WEEKS OF GESTATION, THIRD TRIMESTER: ICD-10-CM

## 2020-11-06 PROCEDURE — 36415 COLL VENOUS BLD VENIPUNCTURE: CPT

## 2020-11-06 PROCEDURE — G0463: CPT

## 2020-11-06 PROCEDURE — 59025 FETAL NON-STRESS TEST: CPT

## 2020-11-06 PROCEDURE — 87800 DETECT AGNT MULT DNA DIREC: CPT

## 2020-11-06 RX ORDER — ACETAMINOPHEN 500 MG
1000 TABLET ORAL ONCE
Refills: 0 | Status: COMPLETED | OUTPATIENT
Start: 2020-11-06 | End: 2020-11-06

## 2020-11-06 RX ORDER — SODIUM CHLORIDE 9 MG/ML
1000 INJECTION, SOLUTION INTRAVENOUS ONCE
Refills: 0 | Status: COMPLETED | OUTPATIENT
Start: 2020-11-06 | End: 2020-11-06

## 2020-11-06 RX ADMIN — Medication 400 MILLIGRAM(S): at 12:22

## 2020-11-06 RX ADMIN — SODIUM CHLORIDE 1000 MILLILITER(S): 9 INJECTION, SOLUTION INTRAVENOUS at 12:21

## 2020-11-06 NOTE — OB RN TRIAGE NOTE - NS_TRIAGEADDITIONAL COMMENTS_OBGYN_ALL_OB_FT
Pt states that she has a history of a degenerative fibroid that has been causing her pain during this pregnacy. Pt states that pain has increased in the last 3 days. Pt also states that she may have lost her mucus plug this morning. VE done by Dr Croft. Yellow discharge noted. Affirm sent amniswab negative, no fluid noted. VE closed. Pt has irregular contractions noted. IV started IV tylenol given for pain Pt states that she has a history of a degenerative fibroid that has been causing her pain during this pregnacy. Pt states that pain has increased in the last 3 days. Pt also states that she may have lost her mucus plug this morning. VE done by Dr Croft. Yellow discharge noted. Affirm sent amniswab negative, no fluid noted. VE closed. Pt has irregular contractions noted. IV started IV tylenol given for pain. 1300 Pt states that pain has decreased to a 3/10 . 1400 Bedside sono done, Pt states that she is feeling better. Pt discharged home. Instructions given

## 2020-11-06 NOTE — OB PROVIDER TRIAGE NOTE - HISTORY OF PRESENT ILLNESS
Pam Joy is a 20 y.o.  at 31 weeks 4 days gestation who presents to L&D via ambulance for complaints of abdominal pain.  She reports a sharp stabbing pain on her left side of her abdomen, as well as across and above her pubic bone. She reports it started 3 days ago. She has not taken anything for the pain. She believes it's due to her degenerating fibroids and was hoping the pain would go away. The pain prevented her from sleeping last night and causes her to feel nauseous. This morning she reports waking up in fluid and the pain feeling even worse. She reports mucous like discharge 3 days ago. She called the ambulance to be taken to the hospital.  She reports good FM, denies contractions and vaginal bleeding.     This pregnancy is complicated by:  -fibroids  -low MARGARITA-A  -anemia  PMH: anemia  PSH: denies  Meds: PNV, baby aspirin  All: morphine (nausea, dizziness)  GynHx: history of fibroids, denies cysts        Vital Signs Last 24 Hrs  T(C): 37.1 (2020 11:06), Max: 37.1 (2020 11:06)  T(F): 98.7 (2020 11:06), Max: 98.7 (2020 11:06)  HR: 96 (2020 11:08) (96 - 96)  BP: 120/62 (2020 11:08) (120/62 - 120/62)  RR: 18 (2020 11:06) (18 - 18)    Gen: distressed, in pain  Pulm: CTAB  Card: RRR  Abd: gravid    Speculum: yellow/green discharge, no active bleeding, no gross pooling, -valsalva  SVE: 0/0/-3 Pam Joy is a 20 y.o.  at 31 weeks 4 days gestation who presents to L&D via ambulance for complaints of abdominal pain.  She reports a sharp stabbing pain on her left side of her abdomen, as well as across and above her pubic bone. She reports it started 3 days ago. She has not taken anything for the pain. She believes it's due to her degenerating fibroids and was hoping the pain would go away. The pain prevented her from sleeping last night and causes her to feel nauseous. This morning she reports waking up in fluid and the pain feeling even worse. She reports mucous like discharge 3 days ago. She called the ambulance to be taken to the hospital.  She reports good FM, denies contractions and vaginal bleeding.     This pregnancy is complicated by:  -fibroids  -low MARGARITA-A  -anemia  PMH: anemia  PSH: denies  Meds: PNV, baby aspirin  All: morphine (nausea, dizziness)  GynHx: history of fibroids, denies cysts        Vital Signs Last 24 Hrs  T(C): 37.1 (2020 11:06), Max: 37.1 (2020 11:06)  T(F): 98.7 (2020 11:06), Max: 98.7 (2020 11:06)  HR: 96 (2020 11:08) (96 - 96)  BP: 120/62 (2020 11:08) (120/62 - 120/62)  RR: 18 (2020 11:06) (18 - 18)    Gen: distressed, in pain  Pulm: CTAB  Card: RRR  Abd: gravid    Speculum: yellow/green discharge, no active bleeding, no gross pooling, -valsalva, nitrazine equivocal, amnisure negative  SVE: 0/0/-3

## 2020-11-06 NOTE — OB PROVIDER TRIAGE NOTE - NSHPPHYSICALEXAM_GEN_ALL_CORE
Vital Signs Last 24 Hrs  T(C): 37.1 (06 Nov 2020 11:06), Max: 37.1 (06 Nov 2020 11:06)  T(F): 98.7 (06 Nov 2020 11:06), Max: 98.7 (06 Nov 2020 11:06)  HR: 85 (06 Nov 2020 13:54) (85 - 96)  BP: 118/59 (06 Nov 2020 13:54) (118/59 - 120/62)  RR: 18 (06 Nov 2020 11:06) (18 - 18)    Bedside sono: BPP 8/8, COOPER 13.8  FHT: 140 baseline, moderate variability, +accels, no decels

## 2020-11-06 NOTE — OB PROVIDER TRIAGE NOTE - NSOBPROVIDERNOTE_OBGYN_ALL_OB_FT
20 y.o.  at 31 weeks 4 days gestation evaluated for abdominal pain likely to be due to degenerating fibroids. Patient advised to take tylenol for pain at home.    -IV tyelnol  -LR bolus 20 y.o.  at 31 weeks 4 days gestation evaluated for abdominal pain likely to be due to degenerating fibroids.   - Reactive NST, BPP   - Pain improvement with IV Tylenol and LR bolus  - Tolerated PO intake   - Patient advised to take tylenol for pain at home.  - Stable for d/c with follow up with obgyn, appointment on 11/10.    Dr. Coley aware and agrees

## 2020-11-10 ENCOUNTER — NON-APPOINTMENT (OUTPATIENT)
Age: 20
End: 2020-11-10

## 2020-11-10 ENCOUNTER — APPOINTMENT (OUTPATIENT)
Dept: OBGYN | Facility: CLINIC | Age: 20
End: 2020-11-10
Payer: MEDICAID

## 2020-11-10 VITALS
BODY MASS INDEX: 28.34 KG/M2 | SYSTOLIC BLOOD PRESSURE: 100 MMHG | WEIGHT: 166 LBS | HEIGHT: 64 IN | DIASTOLIC BLOOD PRESSURE: 70 MMHG

## 2020-11-10 PROCEDURE — 99213 OFFICE O/P EST LOW 20 MIN: CPT | Mod: TH

## 2020-11-22 ENCOUNTER — OUTPATIENT (OUTPATIENT)
Dept: INPATIENT UNIT | Facility: HOSPITAL | Age: 20
LOS: 1 days | End: 2020-11-22
Payer: COMMERCIAL

## 2020-11-22 VITALS
DIASTOLIC BLOOD PRESSURE: 60 MMHG | SYSTOLIC BLOOD PRESSURE: 113 MMHG | TEMPERATURE: 99 F | HEART RATE: 79 BPM | RESPIRATION RATE: 15 BRPM

## 2020-11-22 VITALS — TEMPERATURE: 99 F | RESPIRATION RATE: 16 BRPM

## 2020-11-22 DIAGNOSIS — O47.03 FALSE LABOR BEFORE 37 COMPLETED WEEKS OF GESTATION, THIRD TRIMESTER: ICD-10-CM

## 2020-11-22 LAB
APPEARANCE UR: CLEAR — SIGNIFICANT CHANGE UP
BACTERIA # UR AUTO: ABNORMAL
BILIRUB UR-MCNC: NEGATIVE — SIGNIFICANT CHANGE UP
COLOR SPEC: YELLOW — SIGNIFICANT CHANGE UP
DIFF PNL FLD: ABNORMAL
EPI CELLS # UR: SIGNIFICANT CHANGE UP
GLUCOSE UR QL: NEGATIVE MG/DL — SIGNIFICANT CHANGE UP
KETONES UR-MCNC: NEGATIVE — SIGNIFICANT CHANGE UP
LEUKOCYTE ESTERASE UR-ACNC: ABNORMAL
NITRITE UR-MCNC: NEGATIVE — SIGNIFICANT CHANGE UP
PH UR: 7 — SIGNIFICANT CHANGE UP (ref 5–8)
PROT UR-MCNC: 15 MG/DL
RBC CASTS # UR COMP ASSIST: ABNORMAL /HPF (ref 0–4)
SP GR SPEC: 1.01 — SIGNIFICANT CHANGE UP (ref 1.01–1.02)
UROBILINOGEN FLD QL: 1 MG/DL
WBC UR QL: ABNORMAL

## 2020-11-22 PROCEDURE — 59025 FETAL NON-STRESS TEST: CPT

## 2020-11-22 PROCEDURE — 87086 URINE CULTURE/COLONY COUNT: CPT

## 2020-11-22 PROCEDURE — 81001 URINALYSIS AUTO W/SCOPE: CPT

## 2020-11-22 PROCEDURE — 87591 N.GONORRHOEAE DNA AMP PROB: CPT

## 2020-11-22 PROCEDURE — 36415 COLL VENOUS BLD VENIPUNCTURE: CPT

## 2020-11-22 PROCEDURE — 87491 CHLMYD TRACH DNA AMP PROBE: CPT

## 2020-11-22 PROCEDURE — 87800 DETECT AGNT MULT DNA DIREC: CPT

## 2020-11-22 PROCEDURE — G0463: CPT

## 2020-11-22 RX ORDER — METRONIDAZOLE 7.5 MG/G
1 GEL VAGINAL
Qty: 5 | Refills: 0
Start: 2020-11-22 | End: 2020-11-26

## 2020-11-22 RX ORDER — FLUCONAZOLE 150 MG/1
1 TABLET ORAL
Qty: 1 | Refills: 0
Start: 2020-11-22 | End: 2020-11-22

## 2020-11-22 NOTE — OB RN TRIAGE NOTE - NS_TRIAGEADDITIONAL COMMENTS_OBGYN_ALL_OB_FT
Prescriptions to be sent to patient's pharmacy as per Dr. Liriano Prescriptions to be sent to patient's pharmacy as per Dr. Liriano. Discharge instructions given and explained to patient. Pt verbalizes understanding and denies any further questions.

## 2020-11-22 NOTE — OB RN TRIAGE NOTE - NSNURSINGINSTR_OBGYN_ALL_OB_FT
Keep your upcoming appointment with Dr. Avina.  prescribed medications at your pharmacy and take as directed. Drink plenty of water (10-12 full glasses/day).

## 2020-11-22 NOTE — OB PROVIDER TRIAGE NOTE - NSOBPROVIDERNOTE_OBGYN_ALL_OB_FT
A/P:  19yo  at 33.6 weeks GA who is being evaluated for leakage of fluid.   1. Rule out PPROM  -Denies contractions  -Nitrazine negative, amnisure negative   -COOPER 15.8  -UA/UCx ordered   -GC/CT, affirm ordered for thick white vaginal discharge seen on SSE   -FHT reactive   -[place]: uterine irritability, PO hydration     Dispo: Will continue to observe.     Discussed with Dr. Sue. A/P:  21yo  at 33.6 weeks GA who is being evaluated for leakage of fluid.   1. Rule out PPROM  -Denies contractions  -Nitrazine negative, amnisure negative   -COOPER 15.8  -UA WNL     2. White vaginal discharge  -GC/CT, affirm ordered   -Affirm positive on , untreated   -Will send metrogel and diflucan to pharmacy     FHT: Reactive   [place]: Uterine irritability  Dispo: Stable for discharge to home with outpatient follow up on . Differential diagnoses and plan discussed with the patient. She verbalized understanding and agreement with plan.     Discussed with Dr. Sue.

## 2020-11-22 NOTE — OB RN TRIAGE NOTE - NS_ZIKATRAVEL_OBGYN_ALL_OB
----- Message from Lilian Beth sent at 5/4/2020  8:19 AM CDT -----  Regarding: Surgery rescheduled  His surgery has been rescheduled to 6/1/20 @ [facility]. Left total knee revision.    Please enter the COVID 19 screening order ([id number]) into Epic.    Thanks,  Lilian    
Order entered.  
No

## 2020-11-22 NOTE — OB PROVIDER TRIAGE NOTE - HISTORY OF PRESENT ILLNESS
DIMITRIOS WELCH is a 21yo  at 33.6 weeks GA who presents to L&D for leakage of fluid. Leakage of fluid began at 0100, clear fluid. Patient denies vaginal bleeding and contractions. She endorses good fetal movement. Denies fevers, chills, nausea and vomiting. She denies any urinary complaints, vaginal discharge and vaginal itchiness. No other complaints at this time.   Pregnancy course is significant for:  1. LGA, EFW at 92nd%ile on 10/27  2. Low PAPPA, on aspirin 81mg    3. H/o asthma  4. H/o migraine  5. H/o anemia     POB: Denies  PGYN: +fibroids/-cysts, denies STD hx, abnormal PAP during this pregnancy (LGSIL)  PMH: Asthma, anemia   PSH: Denies  SH: Denies tobacco use, EtOH use and illicit drug use during the pregnancy; Feels safe at home  Meds: PNVs, aspirin 81mg daily   All: Morphine (nausea, vomiting)

## 2020-11-22 NOTE — OB PROVIDER TRIAGE NOTE - NSHPPHYSICALEXAM_GEN_ALL_CORE
T(C): 37.2 (11-22-20 @ 09:53), Max: 37.2 (11-22-20 @ 09:51)  HR: 105 (11-22-20 @ 09:54) (105 - 105)  BP: 122/69 (11-22-20 @ 09:54) (122/69 - 122/69)  RR: 16 (11-22-20 @ 09:53) (16 - 16)    Gen: NAD, well-appearing  Heart: RRR  Lungs: CTAB  Ext: non-edematous, non-tender   SVE: Deferred  SSE: cervix visualized, closed and without any signs of bleeding or drainage, no pooling, thick white cottage cheese appearing vaginal discharge    Bedside sono: vertex, anterior placenta, COOPER 15.8  FHT: baseline 130s, moderate variability, +accels, -decels   [place]: uterine irritability T(C): 37.2 (11-22-20 @ 09:53), Max: 37.2 (11-22-20 @ 09:51)  HR: 105 (11-22-20 @ 09:54) (105 - 105)  BP: 122/69 (11-22-20 @ 09:54) (122/69 - 122/69)  RR: 16 (11-22-20 @ 09:53) (16 - 16)    Gen: NAD, well-appearing  Heart: RRR  Lungs: CTAB  Ext: non-edematous, non-tender   SVE: 0/0/-3  SSE: cervix visualized, closed and without any signs of bleeding or drainage, no pooling, thick white cottage cheese appearing vaginal discharge    Bedside sono: vertex, anterior placenta, COOPER 15.8  FHT: baseline 130s, moderate variability, +accels, -decels   [place]: uterine irritability

## 2020-11-23 LAB
C TRACH RRNA SPEC QL NAA+PROBE: SIGNIFICANT CHANGE UP
CANDIDA AB TITR SER: DETECTED
CULTURE RESULTS: SIGNIFICANT CHANGE UP
G VAGINALIS DNA SPEC QL NAA+PROBE: DETECTED
N GONORRHOEA RRNA SPEC QL NAA+PROBE: SIGNIFICANT CHANGE UP
SPECIMEN SOURCE: SIGNIFICANT CHANGE UP
SPECIMEN SOURCE: SIGNIFICANT CHANGE UP
T VAGINALIS SPEC QL WET PREP: SIGNIFICANT CHANGE UP

## 2020-11-24 ENCOUNTER — RESULT REVIEW (OUTPATIENT)
Age: 20
End: 2020-11-24

## 2020-11-24 ENCOUNTER — NON-APPOINTMENT (OUTPATIENT)
Age: 20
End: 2020-11-24

## 2020-11-24 ENCOUNTER — APPOINTMENT (OUTPATIENT)
Dept: ULTRASOUND IMAGING | Facility: CLINIC | Age: 20
End: 2020-11-24
Payer: MEDICAID

## 2020-11-24 ENCOUNTER — OUTPATIENT (OUTPATIENT)
Dept: OUTPATIENT SERVICES | Facility: HOSPITAL | Age: 20
LOS: 1 days | End: 2020-11-24
Payer: MEDICAID

## 2020-11-24 ENCOUNTER — APPOINTMENT (OUTPATIENT)
Dept: OBGYN | Facility: CLINIC | Age: 20
End: 2020-11-24
Payer: MEDICAID

## 2020-11-24 VITALS — WEIGHT: 168 LBS | SYSTOLIC BLOOD PRESSURE: 110 MMHG | HEIGHT: 64 IN | DIASTOLIC BLOOD PRESSURE: 76 MMHG

## 2020-11-24 DIAGNOSIS — M79.661 PAIN IN RIGHT LOWER LEG: ICD-10-CM

## 2020-11-24 PROBLEM — N20.0 CALCULUS OF KIDNEY: Chronic | Status: ACTIVE | Noted: 2020-11-22

## 2020-11-24 PROBLEM — G43.909 MIGRAINE, UNSPECIFIED, NOT INTRACTABLE, WITHOUT STATUS MIGRAINOSUS: Chronic | Status: ACTIVE | Noted: 2020-11-22

## 2020-11-24 PROCEDURE — 99214 OFFICE O/P EST MOD 30 MIN: CPT | Mod: TH

## 2020-11-24 PROCEDURE — 99072 ADDL SUPL MATRL&STAF TM PHE: CPT

## 2020-11-24 PROCEDURE — 93970 EXTREMITY STUDY: CPT

## 2020-11-24 PROCEDURE — 93970 EXTREMITY STUDY: CPT | Mod: 26

## 2020-12-04 ENCOUNTER — APPOINTMENT (OUTPATIENT)
Dept: OBGYN | Facility: CLINIC | Age: 20
End: 2020-12-04
Payer: MEDICAID

## 2020-12-04 ENCOUNTER — NON-APPOINTMENT (OUTPATIENT)
Age: 20
End: 2020-12-04

## 2020-12-04 VITALS
DIASTOLIC BLOOD PRESSURE: 72 MMHG | BODY MASS INDEX: 28.78 KG/M2 | SYSTOLIC BLOOD PRESSURE: 108 MMHG | WEIGHT: 168.56 LBS | HEIGHT: 64 IN

## 2020-12-04 DIAGNOSIS — Z23 ENCOUNTER FOR IMMUNIZATION: ICD-10-CM

## 2020-12-04 DIAGNOSIS — Z13.71 ENCOUNTER FOR NONPROCREATIVE SCREENING FOR GENETIC DISEASE CARRIER STATUS: ICD-10-CM

## 2020-12-04 PROCEDURE — 99072 ADDL SUPL MATRL&STAF TM PHE: CPT

## 2020-12-04 PROCEDURE — 99213 OFFICE O/P EST LOW 20 MIN: CPT | Mod: 25

## 2020-12-04 PROCEDURE — 90715 TDAP VACCINE 7 YRS/> IM: CPT

## 2020-12-04 PROCEDURE — 90471 IMMUNIZATION ADMIN: CPT

## 2020-12-07 ENCOUNTER — LABORATORY RESULT (OUTPATIENT)
Age: 20
End: 2020-12-07

## 2020-12-08 ENCOUNTER — NON-APPOINTMENT (OUTPATIENT)
Age: 20
End: 2020-12-08

## 2020-12-08 ENCOUNTER — APPOINTMENT (OUTPATIENT)
Dept: OBGYN | Facility: CLINIC | Age: 20
End: 2020-12-08
Payer: MEDICAID

## 2020-12-08 VITALS
SYSTOLIC BLOOD PRESSURE: 120 MMHG | DIASTOLIC BLOOD PRESSURE: 70 MMHG | WEIGHT: 174.56 LBS | BODY MASS INDEX: 29.8 KG/M2 | HEIGHT: 64 IN

## 2020-12-08 PROCEDURE — 99213 OFFICE O/P EST LOW 20 MIN: CPT | Mod: TH

## 2020-12-08 PROCEDURE — 99072 ADDL SUPL MATRL&STAF TM PHE: CPT

## 2020-12-09 LAB — HIV1+2 AB SPEC QL IA.RAPID: NONREACTIVE

## 2020-12-10 LAB
GP B STREP DNA SPEC QL NAA+PROBE: DETECTED
GP B STREP DNA SPEC QL NAA+PROBE: NORMAL
SOURCE GBS: NORMAL

## 2020-12-16 ENCOUNTER — NON-APPOINTMENT (OUTPATIENT)
Age: 20
End: 2020-12-16

## 2020-12-16 ENCOUNTER — APPOINTMENT (OUTPATIENT)
Dept: OBGYN | Facility: CLINIC | Age: 20
End: 2020-12-16
Payer: MEDICAID

## 2020-12-16 VITALS
SYSTOLIC BLOOD PRESSURE: 122 MMHG | HEIGHT: 64 IN | TEMPERATURE: 97.9 F | WEIGHT: 178 LBS | BODY MASS INDEX: 30.39 KG/M2 | DIASTOLIC BLOOD PRESSURE: 77 MMHG

## 2020-12-16 PROCEDURE — 99072 ADDL SUPL MATRL&STAF TM PHE: CPT

## 2020-12-16 PROCEDURE — 99214 OFFICE O/P EST MOD 30 MIN: CPT | Mod: TH

## 2020-12-18 ENCOUNTER — OUTPATIENT (OUTPATIENT)
Dept: OUTPATIENT SERVICES | Facility: HOSPITAL | Age: 20
LOS: 1 days | End: 2020-12-18
Payer: COMMERCIAL

## 2020-12-18 VITALS
DIASTOLIC BLOOD PRESSURE: 79 MMHG | HEART RATE: 76 BPM | TEMPERATURE: 99 F | RESPIRATION RATE: 18 BRPM | SYSTOLIC BLOOD PRESSURE: 134 MMHG

## 2020-12-18 VITALS — HEART RATE: 79 BPM | DIASTOLIC BLOOD PRESSURE: 86 MMHG | SYSTOLIC BLOOD PRESSURE: 125 MMHG

## 2020-12-18 DIAGNOSIS — O47.1 FALSE LABOR AT OR AFTER 37 COMPLETED WEEKS OF GESTATION: ICD-10-CM

## 2020-12-18 LAB
APPEARANCE UR: CLEAR — SIGNIFICANT CHANGE UP
BACTERIA # UR AUTO: ABNORMAL
BILIRUB UR-MCNC: NEGATIVE — SIGNIFICANT CHANGE UP
COLOR SPEC: YELLOW — SIGNIFICANT CHANGE UP
DIFF PNL FLD: NEGATIVE — SIGNIFICANT CHANGE UP
EPI CELLS # UR: SIGNIFICANT CHANGE UP
GLUCOSE UR QL: NEGATIVE MG/DL — SIGNIFICANT CHANGE UP
KETONES UR-MCNC: NEGATIVE — SIGNIFICANT CHANGE UP
LEUKOCYTE ESTERASE UR-ACNC: ABNORMAL
NITRITE UR-MCNC: NEGATIVE — SIGNIFICANT CHANGE UP
PH UR: 8 — SIGNIFICANT CHANGE UP (ref 5–8)
PROT UR-MCNC: NEGATIVE MG/DL — SIGNIFICANT CHANGE UP
RBC CASTS # UR COMP ASSIST: NEGATIVE /HPF — SIGNIFICANT CHANGE UP (ref 0–4)
SP GR SPEC: 1.01 — SIGNIFICANT CHANGE UP (ref 1.01–1.02)
UROBILINOGEN FLD QL: NEGATIVE MG/DL — SIGNIFICANT CHANGE UP
WBC UR QL: ABNORMAL

## 2020-12-18 PROCEDURE — 81001 URINALYSIS AUTO W/SCOPE: CPT

## 2020-12-18 PROCEDURE — G0463: CPT

## 2020-12-18 PROCEDURE — 59025 FETAL NON-STRESS TEST: CPT

## 2020-12-18 RX ORDER — ACETAMINOPHEN 500 MG
975 TABLET ORAL ONCE
Refills: 0 | Status: COMPLETED | OUTPATIENT
Start: 2020-12-18 | End: 2020-12-18

## 2020-12-18 RX ORDER — OXYCODONE HYDROCHLORIDE 5 MG/1
5 TABLET ORAL ONCE
Refills: 0 | Status: DISCONTINUED | OUTPATIENT
Start: 2020-12-18 | End: 2020-12-18

## 2020-12-18 RX ORDER — ACETAMINOPHEN 500 MG
650 TABLET ORAL ONCE
Refills: 0 | Status: DISCONTINUED | OUTPATIENT
Start: 2020-12-18 | End: 2020-12-18

## 2020-12-18 RX ORDER — ACETAMINOPHEN 500 MG
1000 TABLET ORAL ONCE
Refills: 0 | Status: COMPLETED | OUTPATIENT
Start: 2020-12-18 | End: 2020-12-18

## 2020-12-18 RX ORDER — SODIUM CHLORIDE 9 MG/ML
1000 INJECTION, SOLUTION INTRAVENOUS ONCE
Refills: 0 | Status: COMPLETED | OUTPATIENT
Start: 2020-12-18 | End: 2020-12-18

## 2020-12-18 RX ADMIN — SODIUM CHLORIDE 1000 MILLILITER(S): 9 INJECTION, SOLUTION INTRAVENOUS at 10:35

## 2020-12-18 RX ADMIN — OXYCODONE HYDROCHLORIDE 5 MILLIGRAM(S): 5 TABLET ORAL at 14:15

## 2020-12-18 RX ADMIN — OXYCODONE HYDROCHLORIDE 5 MILLIGRAM(S): 5 TABLET ORAL at 13:48

## 2020-12-18 RX ADMIN — Medication 975 MILLIGRAM(S): at 12:45

## 2020-12-18 RX ADMIN — Medication 975 MILLIGRAM(S): at 12:16

## 2020-12-18 RX ADMIN — Medication 400 MILLIGRAM(S): at 15:08

## 2020-12-18 NOTE — OB PROVIDER TRIAGE NOTE - HISTORY OF PRESENT ILLNESS
The pt is a 19 y/o  at 37w4d who presents with contractions since 8am this morning. The pt reports that they are 15 minutes apart. She denies vaginal bleeding, LOF, reports fetal movement, but notes that its decreased from baseline. Pt reports losing her father recently and is concerned about going into labor at the  tomorrow.     This pregnancy is complicated by:  - iron deficiency anemia  - low MARGARITA-A  - fibroids    GynHx: LGSIL on pap, fibroids  PMH: iron deficiency anemia, asthma, migraine  PSH: denies  Meds: PNV, iron, zofran, aspirin  All: morphine  Social: denies toxic habits x3

## 2020-12-18 NOTE — OB RN TRIAGE NOTE - NS_SOURCEOFINFO_OBGYN_ALL_OB
[Indicates wants] : indicates wants [Plays peek-a-mays] : plays peek-a-mays [Stranger anxiety] : stranger anxiety [Miranda 2 objects held in hands] : passes objects [Takes objects] : takes objects [Points at object] : points at object [Clive] : clive [Imitates speech/sounds] : imitates speech/sounds [Arun/Mama specific] : arun/mama specific [Get to sitting] : get to sitting [Pull to stand] : pull to stand [Stands holding on] : stands holding on [Sits well] : sits well  [Drinks from cup] : does not drink  from cup Patient

## 2020-12-18 NOTE — OB PROVIDER TRIAGE NOTE - NSHPPHYSICALEXAM_GEN_ALL_CORE
Vital Signs Last 24 Hrs  T(C): 37 (18 Dec 2020 10:12), Max: 37 (18 Dec 2020 10:12)  T(F): 98.6 (18 Dec 2020 10:12), Max: 98.6 (18 Dec 2020 10:12)  HR: 76 (18 Dec 2020 10:12) (76 - 76)  BP: 134/79 (18 Dec 2020 10:12) (134/79 - 134/79)  RR: 18 (18 Dec 2020 10:12) (18 - 18)    General: appears uncomfortable   Heart: RRR  Lungs: CTAB  Abdomen: gravid    FHT: 140 baseline, moderate variability, + accels, no decels  [place]: Contractions q2-4 mins   SVE: 0/0/-3

## 2020-12-18 NOTE — OB PROVIDER TRIAGE NOTE - NSOBPROVIDERNOTE_OBGYN_ALL_OB_FT
The pt is a 21 y/o  at 37w4d who presents with regular contractions  - NST reactive  - Will IV hydrate   - Observe and reexamine The pt is a 19 y/o  at 37w4d who presents with regular contractions  - NST reactive  - Will IV hydrate   - Observe and reexamine      Patient re-examined and given IV tylenol and 5 of oxycodone with some improvement of pain. SHe continued to contract regularly. She was reexamined at 4 pm and was 0.5/50/-2. She was told that she was likely in early labor, but not making cervical change. Patient opted to go home so she could go to her father's  tomorrow. Given return precautions. FHT reactive on discharge.       discussed with Dr. Avina

## 2020-12-19 ENCOUNTER — TRANSCRIPTION ENCOUNTER (OUTPATIENT)
Age: 20
End: 2020-12-19

## 2020-12-19 ENCOUNTER — INPATIENT (INPATIENT)
Facility: HOSPITAL | Age: 20
LOS: 3 days | Discharge: HOME | End: 2020-12-23
Attending: OBSTETRICS & GYNECOLOGY | Admitting: OBSTETRICS & GYNECOLOGY
Payer: COMMERCIAL

## 2020-12-19 VITALS
SYSTOLIC BLOOD PRESSURE: 128 MMHG | HEART RATE: 85 BPM | TEMPERATURE: 98 F | DIASTOLIC BLOOD PRESSURE: 90 MMHG | RESPIRATION RATE: 16 BRPM

## 2020-12-19 DIAGNOSIS — O47.1 FALSE LABOR AT OR AFTER 37 COMPLETED WEEKS OF GESTATION: ICD-10-CM

## 2020-12-19 DIAGNOSIS — O26.893 OTHER SPECIFIED PREGNANCY RELATED CONDITIONS, THIRD TRIMESTER: ICD-10-CM

## 2020-12-19 DIAGNOSIS — Z3A.37 37 WEEKS GESTATION OF PREGNANCY: ICD-10-CM

## 2020-12-19 LAB
ALBUMIN SERPL ELPH-MCNC: 3.4 G/DL — SIGNIFICANT CHANGE UP (ref 3.3–5.2)
ALP SERPL-CCNC: 134 U/L — HIGH (ref 40–120)
ALT FLD-CCNC: 7 U/L — SIGNIFICANT CHANGE UP
ANION GAP SERPL CALC-SCNC: 14 MMOL/L — SIGNIFICANT CHANGE UP (ref 5–17)
APTT BLD: 29.8 SEC — SIGNIFICANT CHANGE UP (ref 27.5–35.5)
AST SERPL-CCNC: 20 U/L — SIGNIFICANT CHANGE UP
BILIRUB SERPL-MCNC: 0.4 MG/DL — SIGNIFICANT CHANGE UP (ref 0.4–2)
BLD GP AB SCN SERPL QL: SIGNIFICANT CHANGE UP
BUN SERPL-MCNC: 6 MG/DL — LOW (ref 8–20)
CALCIUM SERPL-MCNC: 8.9 MG/DL — SIGNIFICANT CHANGE UP (ref 8.6–10.2)
CHLORIDE SERPL-SCNC: 104 MMOL/L — SIGNIFICANT CHANGE UP (ref 98–107)
CO2 SERPL-SCNC: 18 MMOL/L — LOW (ref 22–29)
CREAT SERPL-MCNC: 0.49 MG/DL — LOW (ref 0.5–1.3)
FIBRINOGEN PPP-MCNC: 653 MG/DL — HIGH (ref 290–520)
GLUCOSE BLDC GLUCOMTR-MCNC: 82 MG/DL — SIGNIFICANT CHANGE UP (ref 70–99)
GLUCOSE SERPL-MCNC: 54 MG/DL — CRITICAL LOW (ref 70–99)
HCT VFR BLD CALC: 23.8 % — LOW (ref 34.5–45)
HCT VFR BLD CALC: 24.2 % — LOW (ref 34.5–45)
HGB BLD-MCNC: 6.6 G/DL — CRITICAL LOW (ref 11.5–15.5)
HGB BLD-MCNC: 6.6 G/DL — CRITICAL LOW (ref 11.5–15.5)
INR BLD: 1.1 RATIO — SIGNIFICANT CHANGE UP (ref 0.88–1.16)
MCHC RBC-ENTMCNC: 17.5 PG — LOW (ref 27–34)
MCHC RBC-ENTMCNC: 17.7 PG — LOW (ref 27–34)
MCHC RBC-ENTMCNC: 27.3 GM/DL — LOW (ref 32–36)
MCHC RBC-ENTMCNC: 27.7 GM/DL — LOW (ref 32–36)
MCV RBC AUTO: 63.8 FL — LOW (ref 80–100)
MCV RBC AUTO: 64.2 FL — LOW (ref 80–100)
PLATELET # BLD AUTO: 417 K/UL — HIGH (ref 150–400)
PLATELET # BLD AUTO: 434 K/UL — HIGH (ref 150–400)
POTASSIUM SERPL-MCNC: 4 MMOL/L — SIGNIFICANT CHANGE UP (ref 3.5–5.3)
POTASSIUM SERPL-SCNC: 4 MMOL/L — SIGNIFICANT CHANGE UP (ref 3.5–5.3)
PROT SERPL-MCNC: 7.3 G/DL — SIGNIFICANT CHANGE UP (ref 6.6–8.7)
PROTHROM AB SERPL-ACNC: 12.7 SEC — SIGNIFICANT CHANGE UP (ref 10.6–13.6)
RBC # BLD: 3.73 M/UL — LOW (ref 3.8–5.2)
RBC # BLD: 3.77 M/UL — LOW (ref 3.8–5.2)
RBC # FLD: 22.3 % — HIGH (ref 10.3–14.5)
RBC # FLD: 22.4 % — HIGH (ref 10.3–14.5)
SARS-COV-2 RNA SPEC QL NAA+PROBE: SIGNIFICANT CHANGE UP
SODIUM SERPL-SCNC: 136 MMOL/L — SIGNIFICANT CHANGE UP (ref 135–145)
URATE SERPL-MCNC: 3.8 MG/DL — SIGNIFICANT CHANGE UP (ref 2.4–5.7)
WBC # BLD: 9.2 K/UL — SIGNIFICANT CHANGE UP (ref 3.8–10.5)
WBC # BLD: 9.28 K/UL — SIGNIFICANT CHANGE UP (ref 3.8–10.5)
WBC # FLD AUTO: 9.2 K/UL — SIGNIFICANT CHANGE UP (ref 3.8–10.5)
WBC # FLD AUTO: 9.28 K/UL — SIGNIFICANT CHANGE UP (ref 3.8–10.5)

## 2020-12-19 PROCEDURE — 59514 CESAREAN DELIVERY ONLY: CPT | Mod: U7

## 2020-12-19 RX ORDER — SODIUM CHLORIDE 9 MG/ML
1000 INJECTION, SOLUTION INTRAVENOUS ONCE
Refills: 0 | Status: COMPLETED | OUTPATIENT
Start: 2020-12-19 | End: 2020-12-19

## 2020-12-19 RX ORDER — SODIUM CHLORIDE 9 MG/ML
1000 INJECTION, SOLUTION INTRAVENOUS
Refills: 0 | Status: DISCONTINUED | OUTPATIENT
Start: 2020-12-19 | End: 2020-12-21

## 2020-12-19 RX ORDER — SODIUM CHLORIDE 9 MG/ML
1000 INJECTION, SOLUTION INTRAVENOUS
Refills: 0 | Status: DISCONTINUED | OUTPATIENT
Start: 2020-12-19 | End: 2020-12-19

## 2020-12-19 RX ORDER — ACETAMINOPHEN 500 MG
1000 TABLET ORAL ONCE
Refills: 0 | Status: COMPLETED | OUTPATIENT
Start: 2020-12-19 | End: 2020-12-19

## 2020-12-19 RX ORDER — CITRIC ACID/SODIUM CITRATE 300-500 MG
30 SOLUTION, ORAL ORAL ONCE
Refills: 0 | Status: COMPLETED | OUTPATIENT
Start: 2020-12-19 | End: 2020-12-19

## 2020-12-19 RX ORDER — OXYTOCIN 10 UNIT/ML
333.33 VIAL (ML) INJECTION
Qty: 20 | Refills: 0 | Status: DISCONTINUED | OUTPATIENT
Start: 2020-12-19 | End: 2020-12-23

## 2020-12-19 RX ORDER — BUTORPHANOL TARTRATE 2 MG/ML
2 INJECTION, SOLUTION INTRAMUSCULAR; INTRAVENOUS ONCE
Refills: 0 | Status: DISCONTINUED | OUTPATIENT
Start: 2020-12-19 | End: 2020-12-19

## 2020-12-19 RX ADMIN — SODIUM CHLORIDE 1000 MILLILITER(S): 9 INJECTION, SOLUTION INTRAVENOUS at 20:15

## 2020-12-19 RX ADMIN — SODIUM CHLORIDE 125 MILLILITER(S): 9 INJECTION, SOLUTION INTRAVENOUS at 15:31

## 2020-12-19 RX ADMIN — Medication 400 MILLIGRAM(S): at 15:31

## 2020-12-19 RX ADMIN — Medication 400 MILLIGRAM(S): at 20:05

## 2020-12-19 RX ADMIN — BUTORPHANOL TARTRATE 2 MILLIGRAM(S): 2 INJECTION, SOLUTION INTRAMUSCULAR; INTRAVENOUS at 17:48

## 2020-12-19 RX ADMIN — SODIUM CHLORIDE 1000 MILLILITER(S): 9 INJECTION, SOLUTION INTRAVENOUS at 15:08

## 2020-12-19 RX ADMIN — BUTORPHANOL TARTRATE 2 MILLIGRAM(S): 2 INJECTION, SOLUTION INTRAMUSCULAR; INTRAVENOUS at 17:33

## 2020-12-19 RX ADMIN — Medication 1000 MILLIGRAM(S): at 20:30

## 2020-12-19 RX ADMIN — Medication 30 MILLILITER(S): at 20:22

## 2020-12-19 RX ADMIN — Medication 1000 MILLIGRAM(S): at 15:46

## 2020-12-19 NOTE — OB PROVIDER H&P - ASSESSMENT
The pt is a 21 y/o  at 37w5d who presents with contractions q 5 minutes admitted for pain control now noted to have 2 elevated BPs in the 150s. Otherwise asymptomatic     Plan:   [] Pt admitted for pain control, just received stadol. Will plan for epidural   [] BPs elevated, not severe yet, will trend. Will send PIH labs. Pt at least with gestational hypertension. Plan to induce labor as per ACOG since she is >37 weeks. Once delivered, can give Indocin for pain control 2/2 to degenerating fibroids. Will follow-up PIH labs closely and start mag sulfate if develops severe features   [] Dr. Harris to obtain consent.

## 2020-12-19 NOTE — OB PROVIDER TRIAGE NOTE - NSOBPROVIDERNOTE_OBGYN_ALL_OB_FT
The pt is a 19 y/o  at 37w5d presenting with severe abdominal pain and contractions q 5 minutes     Plan:   [] IV hydration   [] Pain medication   [] FHT reactive, continuous monitoring   [] Pt desires elective primary c/s for pain will reevaluate in 1 hour

## 2020-12-19 NOTE — PROVIDER CONTACT NOTE (CRITICAL VALUE NOTIFICATION) - BACKGROUND
37 weeks 5 days patient presents to Labor and Delivery unit c/o contraction pain (prodromal labor)  Patient currently on therapeutic rest as per MD

## 2020-12-19 NOTE — CHART NOTE - NSCHARTNOTEFT_GEN_A_CORE
Patient complaining of headache now  that is new and bothersome.  She has had moderately elevated BP, making her gHTN, but not severe range.       ICU Vital Signs Last 24 Hrs  T(C): 36.9 (19 Dec 2020 19:10), Max: 36.9 (19 Dec 2020 19:10)  T(F): 98.42 (19 Dec 2020 19:10), Max: 98.42 (19 Dec 2020 19:10)  HR: 76 (19 Dec 2020 20:04) (61 - 102)  BP: 131/88 (19 Dec 2020 19:59) (119/68 - 157/72)  RR: 18 (19 Dec 2020 19:10) (16 - 19)  SpO2: 100% (19 Dec 2020 20:04) (92% - 100%)                6.6                  136  | 18.0 | 6.0          9.28  >-----------< 434     ------------------------< 54                    24.2                 4.0  | 104  | 0.49                                         Ca 8.9   Mg x     Ph x        Aspartate Aminotransferase (AST/SGOT): 20 U/L (12-19-20 @ 18:07)  Alanine Aminotransferase (ALT/SGPT): 7 U/L (12-19-20 @ 18:07)      She is hemoconcentrated currently with severe anemia and gHTN. Will give IV tylenol now and reassess in 45 min- 1 hr to see if HA improved. If not, will consider giving magnesium of seizure prophylaxis for pre-eclampsia. ordered 2 units of PRBC that will be transfused during IOL.   - will get epidural for patient comfort and then cook balloon + cytotec for IOL      discussed with Dr. Mayorga

## 2020-12-19 NOTE — OB PROVIDER H&P - HISTORY OF PRESENT ILLNESS
The pt is a 21 y/o  at 37w5d who presents with contractions q 5 minutes. She was here yesterday for severe pain required IV tylenol and PO oxycodone     She denies vaginal bleeding, LOF. + FM     Came from her father's .     This pregnancy is complicated by:  - iron deficiency anemia  - low MARGARITA-A  - fibroids    GynHx: LGSIL on pap, fibroids  PMH: iron deficiency anemia, asthma, migraine  PSH: denies  Meds: PNV, iron, zofran, aspirin  All: morphine  Social: denies toxic habits x3

## 2020-12-19 NOTE — OB PROVIDER H&P - NSHPPHYSICALEXAM_GEN_ALL_CORE
Vital Signs Last 24 Hrs  T(C): 36.7 (19 Dec 2020 16:27), Max: 36.8 (19 Dec 2020 15:00)  T(F): 98.1 (19 Dec 2020 16:27), Max: 98.24 (19 Dec 2020 15:00)  HR: 84 (19 Dec 2020 17:36) (61 - 85)  BP: 157/72 (19 Dec 2020 17:21) (128/90 - 157/72)  RR: 19 (19 Dec 2020 16:27) (16 - 19)  SpO2: 99% (19 Dec 2020 17:36) (99% - 100%)    CV: RRR   Lungs: CTA   Abdomen: soft, tender in LUQ along known fibroid   SVE: 0.5/0/-3 vertex intact

## 2020-12-19 NOTE — OB PROVIDER TRIAGE NOTE - HISTORY OF PRESENT ILLNESS
The pt is a 19 y/o  at 37w5d who presents with contractions q 5 minutes. She was here yesterday for severe pain required IV tylenol and PO oxycodone     She denies vaginal bleeding, LOF. + FM     Came from her father's .     This pregnancy is complicated by:  - iron deficiency anemia  - low MARGARITA-A  - fibroids    GynHx: LGSIL on pap, fibroids  PMH: iron deficiency anemia, asthma, migraine  PSH: denies  Meds: PNV, iron, zofran, aspirin  All: morphine  Social: denies toxic habits x3

## 2020-12-19 NOTE — OB PROVIDER TRIAGE NOTE - NSHPPHYSICALEXAM_GEN_ALL_CORE
Vital Signs Last 24 Hrs  T(C): 36.7 (19 Dec 2020 15:02), Max: 36.8 (19 Dec 2020 15:00)  T(F): 98.1 (19 Dec 2020 15:02), Max: 98.24 (19 Dec 2020 15:00)  HR: 85 (19 Dec 2020 15:02) (79 - 85)  BP: 128/90 (19 Dec 2020 15:02) (125/86 - 128/90)  RR: 16 (19 Dec 2020 15:02) (16 - 16)    CV: RRR   Lungs: CTA   Abdomen: soft, tender in LUQ along known fibroid   SVE: 0.5/0/-3 vertex intact

## 2020-12-20 ENCOUNTER — TRANSCRIPTION ENCOUNTER (OUTPATIENT)
Age: 20
End: 2020-12-20

## 2020-12-20 LAB
ALBUMIN SERPL ELPH-MCNC: 3 G/DL — LOW (ref 3.3–5.2)
ALP SERPL-CCNC: 122 U/L — HIGH (ref 40–120)
ALT FLD-CCNC: 8 U/L — SIGNIFICANT CHANGE UP
ANION GAP SERPL CALC-SCNC: 12 MMOL/L — SIGNIFICANT CHANGE UP (ref 5–17)
APTT BLD: 31.6 SEC — SIGNIFICANT CHANGE UP (ref 27.5–35.5)
AST SERPL-CCNC: 26 U/L — SIGNIFICANT CHANGE UP
BILIRUB SERPL-MCNC: 0.7 MG/DL — SIGNIFICANT CHANGE UP (ref 0.4–2)
BUN SERPL-MCNC: 4 MG/DL — LOW (ref 8–20)
CALCIUM SERPL-MCNC: 8.8 MG/DL — SIGNIFICANT CHANGE UP (ref 8.6–10.2)
CHLORIDE SERPL-SCNC: 106 MMOL/L — SIGNIFICANT CHANGE UP (ref 98–107)
CO2 SERPL-SCNC: 19 MMOL/L — LOW (ref 22–29)
CREAT ?TM UR-MCNC: 30 MG/DL — SIGNIFICANT CHANGE UP
CREAT SERPL-MCNC: 0.57 MG/DL — SIGNIFICANT CHANGE UP (ref 0.5–1.3)
FIBRINOGEN PPP-MCNC: 674 MG/DL — HIGH (ref 290–520)
GLUCOSE SERPL-MCNC: 84 MG/DL — SIGNIFICANT CHANGE UP (ref 70–99)
HCT VFR BLD CALC: 28.4 % — LOW (ref 34.5–45)
HCT VFR BLD CALC: 28.5 % — LOW (ref 34.5–45)
HGB BLD-MCNC: 8.3 G/DL — LOW (ref 11.5–15.5)
HGB BLD-MCNC: 8.4 G/DL — LOW (ref 11.5–15.5)
INR BLD: 1.08 RATIO — SIGNIFICANT CHANGE UP (ref 0.88–1.16)
LDH SERPL L TO P-CCNC: 306 U/L — HIGH (ref 98–192)
MCHC RBC-ENTMCNC: 19.8 PG — LOW (ref 27–34)
MCHC RBC-ENTMCNC: 20.2 PG — LOW (ref 27–34)
MCHC RBC-ENTMCNC: 29.1 GM/DL — LOW (ref 32–36)
MCHC RBC-ENTMCNC: 29.6 GM/DL — LOW (ref 32–36)
MCV RBC AUTO: 68 FL — LOW (ref 80–100)
MCV RBC AUTO: 68.3 FL — LOW (ref 80–100)
PLATELET # BLD AUTO: 345 K/UL — SIGNIFICANT CHANGE UP (ref 150–400)
PLATELET # BLD AUTO: 387 K/UL — SIGNIFICANT CHANGE UP (ref 150–400)
POTASSIUM SERPL-MCNC: 3.9 MMOL/L — SIGNIFICANT CHANGE UP (ref 3.5–5.3)
POTASSIUM SERPL-SCNC: 3.9 MMOL/L — SIGNIFICANT CHANGE UP (ref 3.5–5.3)
PROT ?TM UR-MCNC: 10 MG/DL — SIGNIFICANT CHANGE UP (ref 0–12)
PROT SERPL-MCNC: 6.5 G/DL — LOW (ref 6.6–8.7)
PROT/CREAT UR-RTO: 0.3 RATIO — HIGH
PROTHROM AB SERPL-ACNC: 12.5 SEC — SIGNIFICANT CHANGE UP (ref 10.6–13.6)
RBC # BLD: 4.16 M/UL — SIGNIFICANT CHANGE UP (ref 3.8–5.2)
RBC # BLD: 4.19 M/UL — SIGNIFICANT CHANGE UP (ref 3.8–5.2)
RBC # FLD: 25.1 % — HIGH (ref 10.3–14.5)
RBC # FLD: 25.7 % — HIGH (ref 10.3–14.5)
SARS-COV-2 IGG SERPL QL IA: NEGATIVE — SIGNIFICANT CHANGE UP
SARS-COV-2 IGM SERPL IA-ACNC: 0.06 INDEX — SIGNIFICANT CHANGE UP
SODIUM SERPL-SCNC: 137 MMOL/L — SIGNIFICANT CHANGE UP (ref 135–145)
T PALLIDUM AB TITR SER: NEGATIVE — SIGNIFICANT CHANGE UP
URATE SERPL-MCNC: 4.7 MG/DL — SIGNIFICANT CHANGE UP (ref 2.4–5.7)
WBC # BLD: 10.69 K/UL — HIGH (ref 3.8–10.5)
WBC # BLD: 19.94 K/UL — HIGH (ref 3.8–10.5)
WBC # FLD AUTO: 10.69 K/UL — HIGH (ref 3.8–10.5)
WBC # FLD AUTO: 19.94 K/UL — HIGH (ref 3.8–10.5)

## 2020-12-20 RX ORDER — ONDANSETRON 8 MG/1
4 TABLET, FILM COATED ORAL EVERY 4 HOURS
Refills: 0 | Status: DISCONTINUED | OUTPATIENT
Start: 2020-12-21 | End: 2020-12-23

## 2020-12-20 RX ORDER — AZITHROMYCIN 500 MG/1
500 TABLET, FILM COATED ORAL ONCE
Refills: 0 | Status: COMPLETED | OUTPATIENT
Start: 2020-12-20 | End: 2020-12-20

## 2020-12-20 RX ORDER — HYDRALAZINE HCL 50 MG
5 TABLET ORAL ONCE
Refills: 0 | Status: COMPLETED | OUTPATIENT
Start: 2020-12-20 | End: 2020-12-20

## 2020-12-20 RX ORDER — AMPICILLIN TRIHYDRATE 250 MG
1 CAPSULE ORAL EVERY 4 HOURS
Refills: 0 | Status: DISCONTINUED | OUTPATIENT
Start: 2020-12-20 | End: 2020-12-20

## 2020-12-20 RX ORDER — KETOROLAC TROMETHAMINE 30 MG/ML
30 SYRINGE (ML) INJECTION EVERY 6 HOURS
Refills: 0 | Status: DISCONTINUED | OUTPATIENT
Start: 2020-12-21 | End: 2020-12-23

## 2020-12-20 RX ORDER — MAGNESIUM HYDROXIDE 400 MG/1
30 TABLET, CHEWABLE ORAL
Refills: 0 | Status: DISCONTINUED | OUTPATIENT
Start: 2020-12-20 | End: 2020-12-23

## 2020-12-20 RX ORDER — DIPHENHYDRAMINE HCL 50 MG
25 CAPSULE ORAL EVERY 4 HOURS
Refills: 0 | Status: DISCONTINUED | OUTPATIENT
Start: 2020-12-21 | End: 2020-12-23

## 2020-12-20 RX ORDER — SIMETHICONE 80 MG/1
80 TABLET, CHEWABLE ORAL EVERY 4 HOURS
Refills: 0 | Status: DISCONTINUED | OUTPATIENT
Start: 2020-12-20 | End: 2020-12-23

## 2020-12-20 RX ORDER — DIPHENHYDRAMINE HCL 50 MG
25 CAPSULE ORAL EVERY 6 HOURS
Refills: 0 | Status: DISCONTINUED | OUTPATIENT
Start: 2020-12-20 | End: 2020-12-23

## 2020-12-20 RX ORDER — OXYTOCIN 10 UNIT/ML
2 VIAL (ML) INJECTION
Qty: 30 | Refills: 0 | Status: DISCONTINUED | OUTPATIENT
Start: 2020-12-20 | End: 2020-12-21

## 2020-12-20 RX ORDER — TETANUS TOXOID, REDUCED DIPHTHERIA TOXOID AND ACELLULAR PERTUSSIS VACCINE, ADSORBED 5; 2.5; 8; 8; 2.5 [IU]/.5ML; [IU]/.5ML; UG/.5ML; UG/.5ML; UG/.5ML
0.5 SUSPENSION INTRAMUSCULAR ONCE
Refills: 0 | Status: DISCONTINUED | OUTPATIENT
Start: 2020-12-20 | End: 2020-12-23

## 2020-12-20 RX ORDER — CEFAZOLIN SODIUM 1 G
2000 VIAL (EA) INJECTION ONCE
Refills: 0 | Status: COMPLETED | OUTPATIENT
Start: 2020-12-20 | End: 2020-12-20

## 2020-12-20 RX ORDER — LANOLIN
1 OINTMENT (GRAM) TOPICAL EVERY 6 HOURS
Refills: 0 | Status: DISCONTINUED | OUTPATIENT
Start: 2020-12-20 | End: 2020-12-23

## 2020-12-20 RX ORDER — ACETAMINOPHEN 500 MG
1000 TABLET ORAL ONCE
Refills: 0 | Status: DISCONTINUED | OUTPATIENT
Start: 2020-12-21 | End: 2020-12-23

## 2020-12-20 RX ORDER — AMPICILLIN TRIHYDRATE 250 MG
2 CAPSULE ORAL ONCE
Refills: 0 | Status: COMPLETED | OUTPATIENT
Start: 2020-12-20 | End: 2020-12-20

## 2020-12-20 RX ORDER — MAGNESIUM SULFATE 500 MG/ML
2 VIAL (ML) INJECTION
Qty: 40 | Refills: 0 | Status: DISCONTINUED | OUTPATIENT
Start: 2020-12-20 | End: 2020-12-21

## 2020-12-20 RX ORDER — KETOROLAC TROMETHAMINE 30 MG/ML
30 SYRINGE (ML) INJECTION EVERY 6 HOURS
Refills: 0 | Status: DISCONTINUED | OUTPATIENT
Start: 2020-12-20 | End: 2020-12-22

## 2020-12-20 RX ORDER — ENOXAPARIN SODIUM 100 MG/ML
40 INJECTION SUBCUTANEOUS DAILY
Refills: 0 | Status: DISCONTINUED | OUTPATIENT
Start: 2020-12-21 | End: 2020-12-23

## 2020-12-20 RX ORDER — TRANEXAMIC ACID 100 MG/ML
1000 INJECTION, SOLUTION INTRAVENOUS ONCE
Refills: 0 | Status: COMPLETED | OUTPATIENT
Start: 2020-12-20 | End: 2020-12-20

## 2020-12-20 RX ORDER — ACETAMINOPHEN 500 MG
1000 TABLET ORAL ONCE
Refills: 0 | Status: COMPLETED | OUTPATIENT
Start: 2020-12-20 | End: 2020-12-20

## 2020-12-20 RX ORDER — OXYTOCIN 10 UNIT/ML
333.33 VIAL (ML) INJECTION
Qty: 20 | Refills: 0 | Status: DISCONTINUED | OUTPATIENT
Start: 2020-12-20 | End: 2020-12-23

## 2020-12-20 RX ORDER — IBUPROFEN 200 MG
600 TABLET ORAL EVERY 6 HOURS
Refills: 0 | Status: COMPLETED | OUTPATIENT
Start: 2020-12-20 | End: 2021-11-18

## 2020-12-20 RX ORDER — ACETAMINOPHEN 500 MG
975 TABLET ORAL
Refills: 0 | Status: DISCONTINUED | OUTPATIENT
Start: 2020-12-20 | End: 2020-12-23

## 2020-12-20 RX ORDER — MAGNESIUM SULFATE 500 MG/ML
4 VIAL (ML) INJECTION ONCE
Refills: 0 | Status: COMPLETED | OUTPATIENT
Start: 2020-12-20 | End: 2020-12-20

## 2020-12-20 RX ORDER — NALOXONE HYDROCHLORIDE 4 MG/.1ML
0.1 SPRAY NASAL
Refills: 0 | Status: DISCONTINUED | OUTPATIENT
Start: 2020-12-21 | End: 2020-12-23

## 2020-12-20 RX ORDER — SODIUM CHLORIDE 9 MG/ML
1000 INJECTION, SOLUTION INTRAVENOUS
Refills: 0 | Status: DISCONTINUED | OUTPATIENT
Start: 2020-12-20 | End: 2020-12-21

## 2020-12-20 RX ADMIN — Medication 100 MILLIGRAM(S): at 23:03

## 2020-12-20 RX ADMIN — Medication 50 GM/HR: at 20:38

## 2020-12-20 RX ADMIN — Medication 400 MILLIGRAM(S): at 14:45

## 2020-12-20 RX ADMIN — Medication 108 GRAM(S): at 08:37

## 2020-12-20 RX ADMIN — Medication 100 MILLIGRAM(S): at 15:53

## 2020-12-20 RX ADMIN — Medication 2 MILLIUNIT(S)/MIN: at 15:59

## 2020-12-20 RX ADMIN — SODIUM CHLORIDE 125 MILLILITER(S): 9 INJECTION, SOLUTION INTRAVENOUS at 00:05

## 2020-12-20 RX ADMIN — Medication 108 GRAM(S): at 12:15

## 2020-12-20 RX ADMIN — Medication 200 GRAM(S): at 20:15

## 2020-12-20 RX ADMIN — Medication 5 MILLIGRAM(S): at 20:17

## 2020-12-20 RX ADMIN — Medication 30 MILLIGRAM(S): at 19:33

## 2020-12-20 RX ADMIN — AZITHROMYCIN 255 MILLIGRAM(S): 500 TABLET, FILM COATED ORAL at 15:56

## 2020-12-20 RX ADMIN — SODIUM CHLORIDE 125 MILLILITER(S): 9 INJECTION, SOLUTION INTRAVENOUS at 23:42

## 2020-12-20 RX ADMIN — Medication 1000 MILLIGRAM(S): at 15:15

## 2020-12-20 RX ADMIN — Medication 30 MILLIGRAM(S): at 19:45

## 2020-12-20 RX ADMIN — TRANEXAMIC ACID 220 MILLIGRAM(S): 100 INJECTION, SOLUTION INTRAVENOUS at 15:57

## 2020-12-20 RX ADMIN — Medication 216 GRAM(S): at 04:56

## 2020-12-20 NOTE — OB PROVIDER DELIVERY SUMMARY - NSPROVIDERDELIVERYNOTE_OBGYN_ALL_OB_FT
Brief  Delivery Summary    Procedure: Primary  Delivery due to NRFHT  Findings: Viable male infant delivered in cephalic presentation at 15:59, placenta delivered at 1600.  Apgar scores 9/9  Weight: 3140  EBL: 806  UOP: 600  Complications: None

## 2020-12-20 NOTE — CHART NOTE - NSCHARTNOTEFT_GEN_A_CORE
20 year old patient  presents at 37 weeks 5 days with contractions q 3-5 minutes and severe abdominal pain , probably, secondary to uterine fibroid . Patient was here a day prior to admission with similar pain and was treated with tylenol and oxycodon, but had to leave to attend her father's . Upon re-evaluation cervix was ft/30/-3, she was admitted for pain management , but when systolic BP climbed to above 150 diagnosis of pregnancy induced hypertension was made and patient was augmented with cytotec and cervical balloon after epidural for pain. Hgb was 6.6 so blood transfusion was started.  Patient is now comfortable and BP is now wnl.  Plan--continue with augmentation           Monitor BP           Pain management.  Anticipating .    Mukesh uribe.

## 2020-12-20 NOTE — OB RN INTRAOPERATIVE NOTE - NS_ANESTHESIAEND_OBGYN_ALL_OB_DT
Patient ID: Wesley is a 56 year old male.    Chief Complaint   Patient presents with   • Hyperthyroidism     graves disease and secondary ophtalmopathy     has been on methimazole since 2014   stopped in april 2018 then restarted in Jul 2018   s/p IV steroids for Graves ophtalmopathy, took 12 courses last one was the week of the 4th of July   following u w ophtalmology regularly   took another course of prednisone tapering dose from Oct 11till 15th for his eyes     Seeing ophtalmology end of May, may be going for eye surgery in June  He does not want radiation to eyes    Currently on MMI 2.5 mg a day     still smoking too, cutting down now 3 cig per day (used to do 2 packs per day), he thinks he won't be able to quit totally   wearing sun glasses   still has double vision intermittently     Lab Results   Component Value Date    TSH 1.203 01/17/2019    TSH 4.585 11/02/2018    TSH 0.096 (L) 07/23/2018     Lab Results   Component Value Date    T4FREE 0.8 11/02/2018    T4FREE 1.1 07/23/2018    T4FREE 0.9 04/13/2018     Lab Results   Component Value Date    FT3 3.1 11/02/2018    FT3 3.8 07/23/2018    FT3 3 01/05/2018     Lab Results   Component Value Date    AST 33 01/17/2019     Lab Results   Component Value Date    GPT 24 01/17/2019     Lab Results   Component Value Date    WBC 8.1 01/17/2019     Lab Results   Component Value Date    ANEUT 5.3 01/17/2019     ROS  All other 12 ROS reviewed and are negative otherwise except for what is reported in HPI     No past medical history on file.  No past surgical history on file.  No family history on file.  Social History     Socioeconomic History   • Marital status: /Civil Union     Spouse name: Not on file   • Number of children: Not on file   • Years of education: Not on file   • Highest education level: Not on file   Occupational History   • Not on file   Social Needs   • Financial resource strain: Not on file   • Food insecurity:     Worry: Not on file      Inability: Not on file   • Transportation needs:     Medical: Not on file     Non-medical: Not on file   Tobacco Use   • Smoking status: Current Every Day Smoker   • Smokeless tobacco: Never Used   Substance and Sexual Activity   • Alcohol use: No     Frequency: Never   • Drug use: Not on file   • Sexual activity: Not on file   Lifestyle   • Physical activity:     Days per week: Not on file     Minutes per session: Not on file   • Stress: Not on file   Relationships   • Social connections:     Talks on phone: Not on file     Gets together: Not on file     Attends Jew service: Not on file     Active member of club or organization: Not on file     Attends meetings of clubs or organizations: Not on file     Relationship status: Not on file   • Intimate partner violence:     Fear of current or ex partner: Not on file     Emotionally abused: Not on file     Physically abused: Not on file     Forced sexual activity: Not on file   Other Topics Concern   • Not on file   Social History Narrative   • Not on file     ALLERGIES:   Allergen Reactions   • Penicillins Other (See Comments)     Unknown     Current Outpatient Medications   Medication Sig Dispense Refill   • methIMAzole (TAPAZOLE) 5 MG tablet Take 1 tablet by mouth daily. (Patient taking differently: Take 5 mg by mouth daily. Indications: Take 0.5 tab daily ) 30 tablet 0     No current facility-administered medications for this visit.          Physical exam:  Visit Vitals  /60 (BP Location: [facility])   Pulse 74   Ht 5' 11\" (1.803 m)   Wt 80 kg (176 lb 5.9 oz)   BMI 24.60 kg/m²     GA: NAD  EYES:rt exophtalmos, diplopia + lat and down gaze  Thyroid 15 grams  Heart: normal S1 S2 no murmurs  Lungs: clear  Skin: normal texture and turgor  Musculoskeletal: normal  Neuro alert awake no focal deficits    Assessment      Graves w secondary ophtalmopathy and diplopia:  smoking cessation d/w pt again   Get TSH   Keep methimazole 2. 5 mg a day     RTC in 3 months    20-Dec-2020 17:00

## 2020-12-20 NOTE — OB NEONATOLOGY/PEDIATRICIAN DELIVERY SUMMARY - NSPEDSNEONOTESA_OBGYN_ALL_OB_FT
Requested by  to attend this R C/S/ Vaginal delivery due to of a  y/o G P mom at  weeks GA.  She had + PNC, is  positive, HIV negative, HBsAg negative, GBS negative, Rubella Imm, RPR NR. Maternal history pertinent for.  L&D: AROM at delivery.  Baby born vertex with good cry, transferred to warmer, orally suctioned, dried, and examined.  Baby showed to father and then transferred to mom for STS.  BW:  g  A:   week AGA female with hx of maternal  P: Transition to Regular Nursery under PMD/ Hospitalist care.       Baby left in stable condition with the parents and L&D staff. Requested by Dr. Nieto to attend this P C/S/ due to NRFHT of a20 y/o  mom at 37.6 weeks GA.  She had + PNC, is A positive, HIV negative, HBsAg negative, GBS positive( Ampicillin x3 PTD), Rubella Imm, RPR NR. OB history pertinent for Low MARGARITA-A, preeclampsia. Maternal hx fibroids, kidney stones childhood asthma, migraines.  L&D: AROM at  12:50 clear fluids.  Baby born vertex with good cry, transferred to warmer, orally suctioned, dried, and examined.  Baby showed to father and then transferred to mom for STS.  BW: 3140g  A:  37.6 week AGA male , mother COVID-19 negative  P: Transition to Regular Nursery under PMD/ Hospitalist care.       Baby left in stable condition with the parents and L&D staff.

## 2020-12-20 NOTE — CHART NOTE - NSCHARTNOTEFT_GEN_A_CORE
Patient with elevated blood pressures 15 minutes apart. 160/101 (1950), 186/95 (2004) Denies headaches, visual disturbances, RUQ pain, epigastric pain and new-onset edema.       Vital Signs Last 24 Hrs  T(C): 36.8 (20 Dec 2020 19:15), Max: 37.1 (20 Dec 2020 12:30)  T(F): 98.2 (20 Dec 2020 19:15), Max: 98.78 (20 Dec 2020 12:30)  HR: 62 (20 Dec 2020 20:14) (55 - 116)  BP: 167/- (20 Dec 2020 20:14) (109/56 - 186/95)  RR: 16 (20 Dec 2020 18:12) (16 - 20)  SpO2: 100% (20 Dec 2020 20:11) (89% - 100%)    General: NAD, well-appearing  Heart: RRR  Lungs: CTAB  Ext: non-tender, non-edematous, 2+ DTRs in UE bilaterally     Patient now with preeclampsia with severe features. Hydralazine 5mg IVP given at 2009 and Magnesium Sulfate started.    Discussed with Dr. Bustillo.

## 2020-12-20 NOTE — OB NEONATOLOGY/PEDIATRICIAN DELIVERY SUMMARY - BABY A: APGAR 1 MIN RESP RATE, DELIVERY
[facility]  Progress Note    Guanako Fierro Patient Status:  Inpatient    1951 MRN [id number]   [facility] 7NE-A Attending Romero Black MD   [address] PCP Tevin Romo MD     Subjective:  Guanako Fierro is a(n) ([address]     Hypervolemia     Respiratory insufficiency     Hypokalemia     Hypernatremia     Acute respiratory failure with hypoxia (HCC)    Impression:  Post PEG tube    Plan:  Continue diet as tolerated  Tube feeds per nutritionist recommendations  Tube an (2) good, crying

## 2020-12-20 NOTE — OB RN DELIVERY SUMMARY - NS_SEPSISRSKCALC_OBGYN_ALL_OB_FT
EOS calculated successfully. EOS Risk Factor: 0.5/1000 live births ([facility] national incidence); GA=37w6d; Temp=98.78; ROM=3.15; GBS='Positive'; Antibiotics='GBS specific antibiotics > 2 hrs prior to birth'

## 2020-12-20 NOTE — OB PROVIDER LABOR PROGRESS NOTE - ASSESSMENT
Cat 2 Tracing   Recovery of FHT with repositioning   AROM- clear   FSE attempted     Dr. Sanchez aware 
Patient not ruptured, having increased vaginal discharge. Cuevas in place, put to tension. FHT cat 1. BP WNL. Continue induction of labor. 
Pt is a 20 y.o.  at 37 weeks 6 days gestation undergoing IOL for gHTN. Cat 1 FHT. Epidural in place. Her hgb resulted 6.6 and is receiving her 2nd unit of RBCs. Cervical monreal balloon placed successfully and dilated with 60cc.    - continue IOL, plan for cytotec after 2nd unit of packed red blood cells is finished  - continuous toco and fetal heart monitoring.     Discussed with Dr. Mayorga

## 2020-12-20 NOTE — OB PROVIDER LABOR PROGRESS NOTE - NS_OBIHIFHRDETAILS_OBGYN_ALL_OB_FT
120 baseline, moderate variability, - accels, - decels
140/mod florin/+accels/no decels
150 baseline, moderate variability, + accels, intermittent late decels

## 2020-12-21 DIAGNOSIS — D64.9 ANEMIA, UNSPECIFIED: ICD-10-CM

## 2020-12-21 LAB
ACANTHOCYTES BLD QL SMEAR: SLIGHT — SIGNIFICANT CHANGE UP
ANISOCYTOSIS BLD QL: SIGNIFICANT CHANGE UP
BASOPHILS # BLD AUTO: 0 K/UL — SIGNIFICANT CHANGE UP (ref 0–0.2)
BASOPHILS NFR BLD AUTO: 0 % — SIGNIFICANT CHANGE UP (ref 0–2)
BURR CELLS BLD QL SMEAR: PRESENT — SIGNIFICANT CHANGE UP
EOSINOPHIL # BLD AUTO: 0 K/UL — SIGNIFICANT CHANGE UP (ref 0–0.5)
EOSINOPHIL NFR BLD AUTO: 0 % — SIGNIFICANT CHANGE UP (ref 0–6)
HCT VFR BLD CALC: 24.9 % — LOW (ref 34.5–45)
HGB BLD-MCNC: 7.3 G/DL — LOW (ref 11.5–15.5)
HYPOCHROMIA BLD QL: SLIGHT — SIGNIFICANT CHANGE UP
LYMPHOCYTES # BLD AUTO: 0.77 K/UL — LOW (ref 1–3.3)
LYMPHOCYTES # BLD AUTO: 3.5 % — LOW (ref 13–44)
MACROCYTES BLD QL: SLIGHT — SIGNIFICANT CHANGE UP
MAGNESIUM SERPL-MCNC: 3.9 MG/DL — HIGH (ref 1.6–2.6)
MAGNESIUM SERPL-MCNC: 4.4 MG/DL — HIGH (ref 1.6–2.6)
MAGNESIUM SERPL-MCNC: 5.4 MG/DL — HIGH (ref 1.6–2.6)
MAGNESIUM SERPL-MCNC: 5.8 MG/DL — HIGH (ref 1.6–2.6)
MANUAL SMEAR VERIFICATION: SIGNIFICANT CHANGE UP
MCHC RBC-ENTMCNC: 20.2 PG — LOW (ref 27–34)
MCHC RBC-ENTMCNC: 29.3 GM/DL — LOW (ref 32–36)
MCV RBC AUTO: 68.8 FL — LOW (ref 80–100)
MICROCYTES BLD QL: SIGNIFICANT CHANGE UP
MONOCYTES # BLD AUTO: 0.77 K/UL — SIGNIFICANT CHANGE UP (ref 0–0.9)
MONOCYTES NFR BLD AUTO: 3.5 % — SIGNIFICANT CHANGE UP (ref 2–14)
NEUTROPHILS # BLD AUTO: 20.29 K/UL — HIGH (ref 1.8–7.4)
NEUTROPHILS NFR BLD AUTO: 91.3 % — HIGH (ref 43–77)
NEUTS BAND # BLD: 0.8 % — SIGNIFICANT CHANGE UP (ref 0–8)
OVALOCYTES BLD QL SMEAR: SLIGHT — SIGNIFICANT CHANGE UP
PLAT MORPH BLD: NORMAL — SIGNIFICANT CHANGE UP
PLATELET # BLD AUTO: 377 K/UL — SIGNIFICANT CHANGE UP (ref 150–400)
POIKILOCYTOSIS BLD QL AUTO: SLIGHT — SIGNIFICANT CHANGE UP
POLYCHROMASIA BLD QL SMEAR: SIGNIFICANT CHANGE UP
RBC # BLD: 3.62 M/UL — LOW (ref 3.8–5.2)
RBC # FLD: 25.2 % — HIGH (ref 10.3–14.5)
RBC BLD AUTO: ABNORMAL
SCHISTOCYTES BLD QL AUTO: SLIGHT — SIGNIFICANT CHANGE UP
VARIANT LYMPHS # BLD: 0.9 % — SIGNIFICANT CHANGE UP (ref 0–6)
WBC # BLD: 22.03 K/UL — HIGH (ref 3.8–10.5)
WBC # FLD AUTO: 22.03 K/UL — HIGH (ref 3.8–10.5)

## 2020-12-21 RX ORDER — MAGNESIUM SULFATE 500 MG/ML
2 VIAL (ML) INJECTION
Qty: 40 | Refills: 0 | Status: DISCONTINUED | OUTPATIENT
Start: 2020-12-21 | End: 2020-12-21

## 2020-12-21 RX ORDER — INFLUENZA VIRUS VACCINE 15; 15; 15; 15 UG/.5ML; UG/.5ML; UG/.5ML; UG/.5ML
0.5 SUSPENSION INTRAMUSCULAR ONCE
Refills: 0 | Status: COMPLETED | OUTPATIENT
Start: 2020-12-21 | End: 2020-12-21

## 2020-12-21 RX ORDER — SODIUM CHLORIDE 9 MG/ML
1000 INJECTION, SOLUTION INTRAVENOUS
Refills: 0 | Status: DISCONTINUED | OUTPATIENT
Start: 2020-12-21 | End: 2020-12-23

## 2020-12-21 RX ORDER — OXYCODONE HYDROCHLORIDE 5 MG/1
5 TABLET ORAL
Refills: 0 | Status: DISCONTINUED | OUTPATIENT
Start: 2020-12-21 | End: 2020-12-23

## 2020-12-21 RX ORDER — FERROUS SULFATE 325(65) MG
325 TABLET ORAL DAILY
Refills: 0 | Status: DISCONTINUED | OUTPATIENT
Start: 2020-12-21 | End: 2020-12-23

## 2020-12-21 RX ADMIN — OXYCODONE HYDROCHLORIDE 5 MILLIGRAM(S): 5 TABLET ORAL at 21:13

## 2020-12-21 RX ADMIN — Medication 30 MILLIGRAM(S): at 18:00

## 2020-12-21 RX ADMIN — ENOXAPARIN SODIUM 40 MILLIGRAM(S): 100 INJECTION SUBCUTANEOUS at 12:03

## 2020-12-21 RX ADMIN — Medication 30 MILLIGRAM(S): at 18:22

## 2020-12-21 RX ADMIN — Medication 30 MILLIGRAM(S): at 06:27

## 2020-12-21 RX ADMIN — OXYCODONE HYDROCHLORIDE 5 MILLIGRAM(S): 5 TABLET ORAL at 21:28

## 2020-12-21 RX ADMIN — Medication 325 MILLIGRAM(S): at 12:03

## 2020-12-21 RX ADMIN — Medication 30 MILLIGRAM(S): at 12:20

## 2020-12-21 RX ADMIN — Medication 30 MILLIGRAM(S): at 01:44

## 2020-12-21 RX ADMIN — Medication 975 MILLIGRAM(S): at 15:08

## 2020-12-21 RX ADMIN — Medication 30 MILLIGRAM(S): at 17:44

## 2020-12-21 RX ADMIN — OXYCODONE HYDROCHLORIDE 5 MILLIGRAM(S): 5 TABLET ORAL at 17:21

## 2020-12-21 RX ADMIN — Medication 30 MILLIGRAM(S): at 23:23

## 2020-12-21 RX ADMIN — Medication 975 MILLIGRAM(S): at 16:00

## 2020-12-21 RX ADMIN — Medication 30 MILLIGRAM(S): at 12:03

## 2020-12-21 RX ADMIN — Medication 975 MILLIGRAM(S): at 04:24

## 2020-12-21 RX ADMIN — Medication 30 MILLIGRAM(S): at 01:59

## 2020-12-21 RX ADMIN — INFLUENZA VIRUS VACCINE 0.5 MILLILITER(S): 15; 15; 15; 15 SUSPENSION INTRAMUSCULAR at 03:00

## 2020-12-21 RX ADMIN — Medication 30 MILLIGRAM(S): at 06:12

## 2020-12-21 RX ADMIN — Medication 975 MILLIGRAM(S): at 08:57

## 2020-12-21 RX ADMIN — Medication 975 MILLIGRAM(S): at 09:57

## 2020-12-21 RX ADMIN — Medication 975 MILLIGRAM(S): at 05:24

## 2020-12-21 NOTE — PROGRESS NOTE ADULT - SUBJECTIVE AND OBJECTIVE BOX
Name: DIMITRIOS WELCH  MRN: [id number]  Date Admitted: 20  Location: [facility] 2E2012 ([facility] 2EST)  Attending: Maksim Avina      Post Partum Note:     DIMITRIOS WELCH is a 20y  s/p urgent primary  section POD #1 due to NRFHT. post partum complicated by PECwSF on MgSO4 at 2g/hr. Viable male infant at bedside.    SUBJECTIVE:  No acute events overnight. Pain is well controlled with PRN pain medication. No problems with PO intake. Cuevas in place. Has had flatus but no BM. Denies N/V. Patient is having normal lochia which is decreasing.    She is attempting to breastfeed. She denies any HA, visual changes, SOB or RUQ pain.     OBJECTIVE:    Vital Signs Last 24 Hrs  T(C): 36.6 (21 Dec 2020 05:00), Max: 37.1 (20 Dec 2020 12:30)  T(F): 97.8 (21 Dec 2020 05:00), Max: 98.78 (20 Dec 2020 12:30)  HR: 88 (21 Dec 2020 05:00) (55 - 110)  BP: 127/77 (21 Dec 2020 05:00) (116/73 - 186/95)  RR: 18 (21 Dec 2020 05:00) (16 - 19)  SpO2: 98% (21 Dec 2020 05:00) (89% - 100%)    Physical exam:  General: AOx3, NAD.  Heart: RRR. S1S2.  Lungs: CTABL. Good airflow bilaterally.   Abdomen: +BS, Soft, appropriately tender, nondistended, no guarding or rebound tenderness, firm uterine fundus at umbilicus, the incision is clean dry and intact with dermabond. No erythema or discharge.  Ext: No DVT signs, warm extremities. 2+ refelxes LE bilaterally        LABS:                        8.4    19.94 )-----------( 387      ( 20 Dec 2020 20:25 )             28.4

## 2020-12-21 NOTE — DISCHARGE NOTE OB - MATERIALS PROVIDED
Breastfeeding Log/Breastfeeding Mother’s Support Group Information/Guide to Postpartum Care/Back To Sleep Handout/Shaken Baby Prevention Handout/Breastfeeding Guide and Packet/Birth Certificate Instructions/Tdap Vaccination (VIS Pub Date: January 24, 2012)

## 2020-12-21 NOTE — DISCHARGE NOTE OB - HOSPITAL COURSE
STAT pCS at 37w6d for category II FHT; Dermabond closure. Hgb: 6.6 -> 2upRBCs and TXA preop -> 8.3 -> 4 hours postop -> 8.4 ->7.3 iron --> 7.7.  12/20: Postpartum course c/b severe range BPs requiring hydral 5 x1; s/p MgSO4 for seizure prophylaxis due to pre-eclampsia with severe features   12/22: Started on 30 xl procardia. S/P PT consult, recommending rolling walker until pain is better controlled.     At the time of discharge patient was tolerating a regular diet, ambulating without assistance, voiding spontaneously and pain was well controlled with PRN medications. Patient aware of plan to follow up with her OB 2 weeks after discharge.

## 2020-12-21 NOTE — DISCHARGE NOTE OB - PLAN OF CARE
BP control Recovery Patient should transition to regular activity and resume regular diet. Patient should follow up with her OB for a postpartum checkup 2 weeks after discharge from the hospital. Exclusive breast feeding for the first 6 months is recommended. Nothing per vagina for 6 weeks (incl. sex, douching, etc). Patient should call her doctor sooner if she develops a fever or uncontrolled vaginal bleeding. Continue BP checks and BP meds. Continue medication

## 2020-12-21 NOTE — DISCHARGE NOTE OB - PATIENT PORTAL LINK FT
You can access the FollowMyHealth Patient Portal offered by [facility] by registering at the following website: [email or web address]. By joining [employer]’s FollowMyHealth portal, you will also be able to view your health information using other applications (apps) compatible with our system.

## 2020-12-21 NOTE — PROGRESS NOTE ADULT - ASSESSMENT
DIMITRIOS WELCH is a 20y  s/p urgent primary  section POD #1 due to NRFHT. post partum complicated by PECwSF on MgSO4 at 2g/hr. Viable male infant at bedside.  - Pain controlled on current medications  - Tolerating po   - + flatus  - TOV after Mg. UOP adequate at 1.88mg/kg/hr.  - hgb  6.6-->2u  --> 8.3 --> 8.4 (post op)--> am cbc pending PT on PO iron.  - Lovenox for DVT prophylaxis   - Rh+  - Pt with male infant; wants circumcision  - Dispo: Continue post op care DIMITRIOS WELCH is a 20y  s/p urgent primary  section POD #1 due to NRFHT. post partum complicated by PECwSF on MgSO4 at 2g/hr. Viable male infant at bedside.  - Pain controlled on current medications  - Tolerating po   - + flatus  - TOV after Mg. UOP adequate at 1.88mg/kg/hr.  - hgb  6.6-->2u  --> 8.3 --> 8.4 (post op)--> am cbc pending PT on PO iron.  - Lovenox for DVT prophylaxis   - Rh+  - Pt with male infant; wants circumcision  - Dispo: Continue post op care    I have read and agree with everything in the above note.     Janell Glover- PGY4

## 2020-12-21 NOTE — DISCHARGE NOTE OB - CARE PROVIDER_API CALL
Maksim Avina  OBSTETRICS AND GYNECOLOGY  [address], [address] [place] [address]  Phone: [phone]  Fax: [phone]  Follow Up Time:

## 2020-12-21 NOTE — DISCHARGE NOTE OB - MEDICATION SUMMARY - MEDICATIONS TO STOP TAKING
I will STOP taking the medications listed below when I get home from the hospital:    indomethacin 25 mg oral capsule  -- 1 cap(s) by mouth every 6 hours   -- Do not take aspirin or aspirin containing products without knowledge and consent of your physician.  It is very important that you take or use this exactly as directed.  Do not skip doses or discontinue unless directed by your doctor.  May cause drowsiness or dizziness.  Obtain medical advice before taking any non-prescription drugs as some may affect the action of this medication.  Take with food or milk.    ferrous gluconate 324 mg (37.5 mg elemental iron) oral tablet  -- 1 tab(s) by mouth once a day    metroNIDAZOLE 0.75% vaginal gel with applicator  -- 1 applicatorful intravaginally once a day   -- For vaginal use.    Diflucan 150 mg oral tablet  -- 1 tab(s) by mouth once a day   -- Do not take this drug if you are pregnant.  Finish all this medication unless otherwise directed by prescriber.

## 2020-12-21 NOTE — CHART NOTE - NSCHARTNOTEFT_GEN_A_CORE
21 y/o  PPD #1, s/p pCS, complicated by PECwSF on MgSO4. at 2g/hr    S:  Pt seen and examined at bedside. Resting comfortably. No issues with PO intake. She denies any HA, visual changes, SOB or RUQ pain.     O:  Vital Signs Last 24 Hrs  T(C): 36.8 (21 Dec 2020 14:00), Max: 36.9 (21 Dec 2020 12:00)  T(F): 98.2 (21 Dec 2020 14:00), Max: 98.4 (21 Dec 2020 12:00)  HR: 75 (21 Dec 2020 14:00) (62 - 110)  BP: 125/79 (21 Dec 2020 14:00) (116/73 - 186/95)  RR: 18 (21 Dec 2020 14:00) (16 - 19)  SpO2: 100% (21 Dec 2020 14:00) (92% - 100%)    PE  General: NAD, tired appearing  Heart: RRR  Lungs: CTAB  Abdomen: Soft, appropriately tender, fundus firm at umbilicus, incision C/D/I  Extremities: reflexes 2+ bilaterally LE, non edematous     Urine output: 1mg/kg/hr over the last 9hrs. Adequate    Mag serum: pending, last 5.4    A/P:  21 y/o  PPD #1, s/p pCS, complicated by PECwSF on MgSO4. at 2g/hr  - Continue MgSO4 and d/c at 20:00.   - No signs of mag toxicity   - f/u mg serum   - Continue to monitor BP. 21 y/o  PPD #1, s/p pCS, complicated by PECwSF on MgSO4. at 2g/hr    S:  Pt seen and examined at bedside. Resting comfortably. No issues with PO intake. She denies any HA, visual changes, SOB or RUQ pain.     O:  Vital Signs Last 24 Hrs  T(C): 36.8 (21 Dec 2020 14:00), Max: 36.9 (21 Dec 2020 12:00)  T(F): 98.2 (21 Dec 2020 14:00), Max: 98.4 (21 Dec 2020 12:00)  HR: 75 (21 Dec 2020 14:00) (62 - 110)  BP: 125/79 (21 Dec 2020 14:00) (116/73 - 186/95)  RR: 18 (21 Dec 2020 14:00) (16 - 19)  SpO2: 100% (21 Dec 2020 14:00) (92% - 100%)    PE  General: NAD, tired appearing  Heart: RRR  Lungs: CTAB  Abdomen: Soft, appropriately tender, fundus firm at umbilicus, incision C/D/I  Extremities: reflexes 2+ bilaterally LE, non edematous     Urine output: 1mg/kg/hr over the last 9hrs. Adequate    Mag serum: 15:09: 5.8    A/P:  21 y/o  PPD #1, s/p pCS, complicated by PECwSF on MgSO4. at 2g/hr  - Continue MgSO4 and d/c at 20:00.   - No signs of mag toxicity   - Continue to monitor BP  - Mg Serum q6hr

## 2020-12-21 NOTE — DISCHARGE NOTE OB - MEDICATION SUMMARY - MEDICATIONS TO TAKE
I will START or STAY ON the medications listed below when I get home from the hospital:    ibuprofen 600 mg oral tablet  -- 1 tab(s) by mouth every 6 hours   -- Do not take this drug if you are pregnant.  It is very important that you take or use this exactly as directed.  Do not skip doses or discontinue unless directed by your doctor.  May cause drowsiness or dizziness.  Obtain medical advice before taking any non-prescription drugs as some may affect the action of this medication.  Take with food or milk.    -- Indication: For Pain    oxyCODONE 5 mg oral tablet  -- 1 tab(s) by mouth every 6 hours MDD:4 tabs  -- Caution federal law prohibits the transfer of this drug to any person other  than the person for whom it was prescribed.  It is very important that you take or use this exactly as directed.  Do not skip doses or discontinue unless directed by your doctor.  May cause drowsiness or dizziness.  This prescription cannot be refilled.  Using more of this medication than prescribed may cause serious breathing problems.    -- Indication: For severe pain    Procardia XL 30 mg oral tablet, extended release  -- 1 tab(s) by mouth once a day   -- Avoid grapefruit and grapefruit juice while taking this medication.  It is very important that you take or use this exactly as directed.  Do not skip doses or discontinue unless directed by your doctor.  Some non-prescription drugs may aggravate your condition.  Read all labels carefully.  If a warning appears, check with your doctor before taking.  Swallow whole.  Do not crush.    -- Indication: For HTN    ferrous sulfate 325 mg (65 mg elemental iron) oral tablet  -- 1 tab(s) by mouth once a day   -- Check with your doctor before becoming pregnant.  Do not chew, break, or crush.  May discolor urine or feces.    -- Indication: For iron    Vitamin C 250 mg oral tablet  -- 1 tab(s) by mouth once a day   -- Indication: For vitamin

## 2020-12-21 NOTE — DISCHARGE NOTE OB - CARE PLAN
Principal Discharge DX:	 delivery delivered  Goal:	Recovery  Assessment and plan of treatment:	Patient should transition to regular activity and resume regular diet. Patient should follow up with her OB for a postpartum checkup 2 weeks after discharge from the hospital. Exclusive breast feeding for the first 6 months is recommended. Nothing per vagina for 6 weeks (incl. sex, douching, etc). Patient should call her doctor sooner if she develops a fever or uncontrolled vaginal bleeding.  Secondary Diagnosis:	Preeclampsia in postpartum period  Goal:	BP control  Assessment and plan of treatment:	Continue BP checks and BP meds.  Secondary Diagnosis:	Anemia requiring transfusions  Goal:	Continue medication

## 2020-12-22 ENCOUNTER — RESULT REVIEW (OUTPATIENT)
Age: 20
End: 2020-12-22

## 2020-12-22 LAB
BASOPHILS # BLD AUTO: 0.07 K/UL — SIGNIFICANT CHANGE UP (ref 0–0.2)
BASOPHILS NFR BLD AUTO: 0.4 % — SIGNIFICANT CHANGE UP (ref 0–2)
EOSINOPHIL # BLD AUTO: 0.47 K/UL — SIGNIFICANT CHANGE UP (ref 0–0.5)
EOSINOPHIL NFR BLD AUTO: 3 % — SIGNIFICANT CHANGE UP (ref 0–6)
HCT VFR BLD CALC: 26.6 % — LOW (ref 34.5–45)
HGB BLD-MCNC: 7.7 G/DL — LOW (ref 11.5–15.5)
IMM GRANULOCYTES NFR BLD AUTO: 0.8 % — SIGNIFICANT CHANGE UP (ref 0–1.5)
LYMPHOCYTES # BLD AUTO: 1.09 K/UL — SIGNIFICANT CHANGE UP (ref 1–3.3)
LYMPHOCYTES # BLD AUTO: 6.9 % — LOW (ref 13–44)
MCHC RBC-ENTMCNC: 19.6 PG — LOW (ref 27–34)
MCHC RBC-ENTMCNC: 28.9 GM/DL — LOW (ref 32–36)
MCV RBC AUTO: 67.7 FL — LOW (ref 80–100)
MONOCYTES # BLD AUTO: 1.19 K/UL — HIGH (ref 0–0.9)
MONOCYTES NFR BLD AUTO: 7.6 % — SIGNIFICANT CHANGE UP (ref 2–14)
NEUTROPHILS # BLD AUTO: 12.79 K/UL — HIGH (ref 1.8–7.4)
NEUTROPHILS NFR BLD AUTO: 81.3 % — HIGH (ref 43–77)
PLATELET # BLD AUTO: 414 K/UL — HIGH (ref 150–400)
RBC # BLD: 3.93 M/UL — SIGNIFICANT CHANGE UP (ref 3.8–5.2)
RBC # FLD: 25.6 % — HIGH (ref 10.3–14.5)
WBC # BLD: 15.74 K/UL — HIGH (ref 3.8–10.5)
WBC # FLD AUTO: 15.74 K/UL — HIGH (ref 3.8–10.5)

## 2020-12-22 PROCEDURE — 88307 TISSUE EXAM BY PATHOLOGIST: CPT | Mod: 26

## 2020-12-22 RX ORDER — NIFEDIPINE 30 MG
30 TABLET, EXTENDED RELEASE 24 HR ORAL EVERY 24 HOURS
Refills: 0 | Status: DISCONTINUED | OUTPATIENT
Start: 2020-12-22 | End: 2020-12-23

## 2020-12-22 RX ORDER — NIFEDIPINE 30 MG
10 TABLET, EXTENDED RELEASE 24 HR ORAL ONCE
Refills: 0 | Status: COMPLETED | OUTPATIENT
Start: 2020-12-22 | End: 2020-12-22

## 2020-12-22 RX ORDER — IBUPROFEN 200 MG
600 TABLET ORAL EVERY 6 HOURS
Refills: 0 | Status: DISCONTINUED | OUTPATIENT
Start: 2020-12-22 | End: 2020-12-23

## 2020-12-22 RX ORDER — SODIUM CHLORIDE 0.65 %
1 AEROSOL, SPRAY (ML) NASAL
Refills: 0 | Status: DISCONTINUED | OUTPATIENT
Start: 2020-12-22 | End: 2020-12-23

## 2020-12-22 RX ADMIN — Medication 30 MILLIGRAM(S): at 09:30

## 2020-12-22 RX ADMIN — Medication 975 MILLIGRAM(S): at 03:24

## 2020-12-22 RX ADMIN — OXYCODONE HYDROCHLORIDE 5 MILLIGRAM(S): 5 TABLET ORAL at 14:10

## 2020-12-22 RX ADMIN — Medication 30 MILLIGRAM(S): at 08:31

## 2020-12-22 RX ADMIN — Medication 600 MILLIGRAM(S): at 11:40

## 2020-12-22 RX ADMIN — Medication 975 MILLIGRAM(S): at 21:10

## 2020-12-22 RX ADMIN — OXYCODONE HYDROCHLORIDE 5 MILLIGRAM(S): 5 TABLET ORAL at 05:31

## 2020-12-22 RX ADMIN — Medication 975 MILLIGRAM(S): at 22:10

## 2020-12-22 RX ADMIN — Medication 30 MILLIGRAM(S): at 00:23

## 2020-12-22 RX ADMIN — Medication 975 MILLIGRAM(S): at 10:00

## 2020-12-22 RX ADMIN — Medication 10 MILLIGRAM(S): at 09:04

## 2020-12-22 RX ADMIN — Medication 600 MILLIGRAM(S): at 18:23

## 2020-12-22 RX ADMIN — SIMETHICONE 80 MILLIGRAM(S): 80 TABLET, CHEWABLE ORAL at 09:23

## 2020-12-22 RX ADMIN — Medication 600 MILLIGRAM(S): at 23:31

## 2020-12-22 RX ADMIN — OXYCODONE HYDROCHLORIDE 5 MILLIGRAM(S): 5 TABLET ORAL at 13:15

## 2020-12-22 RX ADMIN — Medication 325 MILLIGRAM(S): at 13:21

## 2020-12-22 RX ADMIN — OXYCODONE HYDROCHLORIDE 5 MILLIGRAM(S): 5 TABLET ORAL at 05:16

## 2020-12-22 RX ADMIN — Medication 975 MILLIGRAM(S): at 16:16

## 2020-12-22 RX ADMIN — Medication 600 MILLIGRAM(S): at 12:35

## 2020-12-22 RX ADMIN — ENOXAPARIN SODIUM 40 MILLIGRAM(S): 100 INJECTION SUBCUTANEOUS at 11:40

## 2020-12-22 RX ADMIN — Medication 600 MILLIGRAM(S): at 17:24

## 2020-12-22 RX ADMIN — OXYCODONE HYDROCHLORIDE 5 MILLIGRAM(S): 5 TABLET ORAL at 18:46

## 2020-12-22 RX ADMIN — SIMETHICONE 80 MILLIGRAM(S): 80 TABLET, CHEWABLE ORAL at 03:24

## 2020-12-22 RX ADMIN — Medication 975 MILLIGRAM(S): at 04:19

## 2020-12-22 RX ADMIN — Medication 30 MILLIGRAM(S): at 17:24

## 2020-12-22 RX ADMIN — Medication 975 MILLIGRAM(S): at 09:04

## 2020-12-22 RX ADMIN — Medication 975 MILLIGRAM(S): at 17:12

## 2020-12-22 NOTE — PROGRESS NOTE ADULT - ASSESSMENT
DIMITRIOS WELCH is a 20y  s/p urgent primary  section POD #2 due to NRFHT. post partum complicated by PECwSF s/p MgSO4.. Viable male infant at bedside.  - Upon entering room, pt found to be in distress due to severe pain, she had not gotten up to void in a few hours. Bedside sono revealed distended bladder, monreal inserted and 900 cc immediately drained, pt reported moderate relief of pain. Will consider IV Toradol if further pain control needed.   - Tolerating po   - + flatus  - Monreal replaced, will d/c at 6pm.  - hgb  6.6-->2u  --> 8.3 --> 8.4 (post op)-->7.3-->7.7  : Pt on PO iron.  - Lovenox for DVT prophylaxis   - Rh+  - Pt with male infant; wants circumcision  - Dispo: Continue post op care     DIIMTRIOS WELCH is a 20y  s/p urgent primary  section POD #2 due to NRFHT. post partum complicated by PECwSF s/p MgSO4.. Viable male infant at bedside.  - Upon entering room, pt found to be in distress due to severe pain, she had not gotten up to void in a few hours. Bedside sono revealed distended bladder, monreal inserted and 900 cc immediately drained, pt reported moderate relief of pain. Will consider IV Toradol if further pain control needed.   - Tolerating po   - + flatus  - Monreal replaced, will d/c at 6pm.  - hgb  6.6-->2u  --> 8.3 --> 8.4 (post op)-->7.3-->7.7  : Pt on PO iron.  - Lovenox for DVT prophylaxis   - Rh+  - Pt with male infant  - Dispo: Continue post op care     done

## 2020-12-22 NOTE — PHYSICAL THERAPY INITIAL EVALUATION ADULT - ADDITIONAL COMMENTS
Pt lives with mother in a private house. 1 step to enter. No steps inside. Mother is home t/o the day to assist as needed. Pt is independent at baseline, no device. Does not own DME.

## 2020-12-22 NOTE — PROGRESS NOTE ADULT - SUBJECTIVE AND OBJECTIVE BOX
Name: DIMITRIOS WELCH  MRN: [id number]  Date Admitted: 20  Location: [facility] 2E2012 ([facility] 2EST)  Attending: Maksim Avina      Post Partum Note:     DIMITRIOS WELCH is a 20y  s/p urgent primary  section POD #2 due to NRFHT. post partum complicated by PECwSF s/p MgSO4. Viable male infant at bedside.    SUBJECTIVE:  Pt in severe pain, unable to move to get up to void. No problems with PO intake. Has had flatus but no BM. Denies N/V. Patient is having normal lochia which is decreasing.    She is breastfeeding. She denies any HA, visual changes, SOB or RUQ pain.     OBJECTIVE:    Vital Signs Last 24 Hrs  T(C): 37.1 (22 Dec 2020 04:58), Max: 37.1 (22 Dec 2020 04:58)  T(F): 98.7 (22 Dec 2020 04:58), Max: 98.7 (22 Dec 2020 04:58)  HR: 73 (22 Dec 2020 04:58) (64 - 88)  BP: 150/90 (22 Dec 2020 05:20) (125/79 - 160/90)  RR: 20 (22 Dec 2020 04:58) (18 - 20)  SpO2: 98% (22 Dec 2020 04:58) (98% - 100%)    Physical exam:  General: AOx3, NAD.  Heart: RRR. S1S2.  Lungs: CTABL. Good airflow bilaterally.   Abdomen: +BS, Soft, markedly tender, no guarding or rebound tenderness, firm uterine fundus at umbilicus, the incision is clean dry and intact with dermabond. No erythema or discharge.  Ext: No DVT signs, warm extremities. 2+ reflexes LE bilaterally        LABS:                        8.4    19.94 )-----------( 387      ( 20 Dec 2020 20:25 )             28.4                             7.7    15.74 )-----------( 414      ( 22 Dec 2020 06:05 )             26.6

## 2020-12-22 NOTE — PHYSICAL THERAPY INITIAL EVALUATION ADULT - GENERAL OBSERVATIONS, REHAB EVAL
Pt received reclined in bedside chair, crying. Reporting pain. RN: Angela sterling. Pt reports desire to participate with PT despite pain.

## 2020-12-23 ENCOUNTER — APPOINTMENT (OUTPATIENT)
Dept: OBGYN | Facility: CLINIC | Age: 20
End: 2020-12-23

## 2020-12-23 VITALS
RESPIRATION RATE: 20 BRPM | OXYGEN SATURATION: 100 % | SYSTOLIC BLOOD PRESSURE: 119 MMHG | DIASTOLIC BLOOD PRESSURE: 76 MMHG | HEART RATE: 91 BPM | TEMPERATURE: 99 F

## 2020-12-23 PROCEDURE — 86769 SARS-COV-2 COVID-19 ANTIBODY: CPT

## 2020-12-23 PROCEDURE — 86780 TREPONEMA PALLIDUM: CPT

## 2020-12-23 PROCEDURE — 36415 COLL VENOUS BLD VENIPUNCTURE: CPT

## 2020-12-23 PROCEDURE — P9016: CPT

## 2020-12-23 PROCEDURE — 85730 THROMBOPLASTIN TIME PARTIAL: CPT

## 2020-12-23 PROCEDURE — P9040: CPT

## 2020-12-23 PROCEDURE — 36430 TRANSFUSION BLD/BLD COMPNT: CPT

## 2020-12-23 PROCEDURE — 86901 BLOOD TYPING SEROLOGIC RH(D): CPT

## 2020-12-23 PROCEDURE — 85027 COMPLETE CBC AUTOMATED: CPT

## 2020-12-23 PROCEDURE — 84550 ASSAY OF BLOOD/URIC ACID: CPT

## 2020-12-23 PROCEDURE — 86900 BLOOD TYPING SEROLOGIC ABO: CPT

## 2020-12-23 PROCEDURE — 59050 FETAL MONITOR W/REPORT: CPT

## 2020-12-23 PROCEDURE — 82962 GLUCOSE BLOOD TEST: CPT

## 2020-12-23 PROCEDURE — G0463: CPT

## 2020-12-23 PROCEDURE — 82570 ASSAY OF URINE CREATININE: CPT

## 2020-12-23 PROCEDURE — 85610 PROTHROMBIN TIME: CPT

## 2020-12-23 PROCEDURE — 83615 LACTATE (LD) (LDH) ENZYME: CPT

## 2020-12-23 PROCEDURE — 85384 FIBRINOGEN ACTIVITY: CPT

## 2020-12-23 PROCEDURE — 83735 ASSAY OF MAGNESIUM: CPT

## 2020-12-23 PROCEDURE — 88307 TISSUE EXAM BY PATHOLOGIST: CPT

## 2020-12-23 PROCEDURE — U0003: CPT

## 2020-12-23 PROCEDURE — 86850 RBC ANTIBODY SCREEN: CPT

## 2020-12-23 PROCEDURE — 86923 COMPATIBILITY TEST ELECTRIC: CPT

## 2020-12-23 PROCEDURE — 90686 IIV4 VACC NO PRSV 0.5 ML IM: CPT

## 2020-12-23 PROCEDURE — 59025 FETAL NON-STRESS TEST: CPT

## 2020-12-23 PROCEDURE — 85025 COMPLETE CBC W/AUTO DIFF WBC: CPT

## 2020-12-23 PROCEDURE — 80053 COMPREHEN METABOLIC PANEL: CPT

## 2020-12-23 PROCEDURE — 97163 PT EVAL HIGH COMPLEX 45 MIN: CPT

## 2020-12-23 PROCEDURE — 84156 ASSAY OF PROTEIN URINE: CPT

## 2020-12-23 RX ORDER — IBUPROFEN 200 MG
1 TABLET ORAL
Qty: 20 | Refills: 0
Start: 2020-12-23 | End: 2020-12-27

## 2020-12-23 RX ORDER — OXYCODONE HYDROCHLORIDE 5 MG/1
1 TABLET ORAL
Qty: 12 | Refills: 0
Start: 2020-12-23 | End: 2020-12-25

## 2020-12-23 RX ORDER — ASCORBIC ACID 60 MG
1 TABLET,CHEWABLE ORAL
Qty: 7 | Refills: 0
Start: 2020-12-23 | End: 2020-12-29

## 2020-12-23 RX ORDER — FERROUS SULFATE 325(65) MG
1 TABLET ORAL
Qty: 7 | Refills: 0
Start: 2020-12-23 | End: 2020-12-29

## 2020-12-23 RX ORDER — NIFEDIPINE 30 MG
1 TABLET, EXTENDED RELEASE 24 HR ORAL
Qty: 14 | Refills: 0
Start: 2020-12-23 | End: 2021-01-05

## 2020-12-23 RX ADMIN — ENOXAPARIN SODIUM 40 MILLIGRAM(S): 100 INJECTION SUBCUTANEOUS at 11:51

## 2020-12-23 RX ADMIN — Medication 325 MILLIGRAM(S): at 11:48

## 2020-12-23 RX ADMIN — SIMETHICONE 80 MILLIGRAM(S): 80 TABLET, CHEWABLE ORAL at 08:36

## 2020-12-23 RX ADMIN — Medication 975 MILLIGRAM(S): at 04:13

## 2020-12-23 RX ADMIN — Medication 975 MILLIGRAM(S): at 03:13

## 2020-12-23 RX ADMIN — Medication 975 MILLIGRAM(S): at 08:36

## 2020-12-23 RX ADMIN — Medication 600 MILLIGRAM(S): at 05:25

## 2020-12-23 RX ADMIN — Medication 600 MILLIGRAM(S): at 00:31

## 2020-12-23 RX ADMIN — Medication 600 MILLIGRAM(S): at 06:25

## 2020-12-23 RX ADMIN — Medication 600 MILLIGRAM(S): at 11:49

## 2020-12-23 NOTE — PROGRESS NOTE ADULT - PROBLEM SELECTOR PROBLEM 2
Preeclampsia in postpartum period

## 2020-12-23 NOTE — PROGRESS NOTE ADULT - SUBJECTIVE AND OBJECTIVE BOX
Name: DIMITRIOS WELCH  MRN: [id number]  Date Admitted: 20  Location: [facility] 2EST 2012 ([facility] 2EST)  Attending: Maksim Avina      Post Partum Note:     DIMITRIOS WELCH is a 20y  s/p urgent primary  section POD #3 due to NRFHT. post partum complicated by PECwSF s/p MgSO4. Viable male infant at bedside.    SUBJECTIVE:  No acute events overnight. Pain better controlled with PRN medications. No problems with PO intake. Has had flatus but no BM. Denies N/V. Patient is having normal lochia which is decreasing.    She is breastfeeding and baby is latching on. She denies any HA, visual changes, SOB or RUQ pain. Pt reporting she wants to go home today.    OBJECTIVE:    Vital Signs Last 24 Hrs  T(C): 37 (23 Dec 2020 04:22), Max: 37 (22 Dec 2020 08:52)  T(F): 98.6 (23 Dec 2020 04:22), Max: 98.6 (22 Dec 2020 08:52)  HR: 91 (23 Dec 2020 04:22) (70 - 97)  BP: 119/76 (23 Dec 2020 04:22) (119/76 - 149/89)  RR: 20 (23 Dec 2020 04:22) (20 - 20)  SpO2: 100% (23 Dec 2020 04:22) (98% - 100%)    Physical exam:  General: AOx3, NAD.  Heart: RRR. S1S2.  Lungs: CTABL. Good airflow bilaterally.   Abdomen: +BS, Soft, markedly tender, no guarding or rebound tenderness, firm uterine fundus at umbilicus, the incision is clean dry and intact with dermabond. No erythema or discharge.  Ext: No DVT signs, warm extremities. 2+ reflexes LE bilaterally        LABS:                        8.4    19.94 )-----------( 387      ( 20 Dec 2020 20:25 )             28.4                             7.7    15.74 )-----------( 414      ( 22 Dec 2020 06:05 )             26.6        Name: DIMITRIOS WELCH  MRN: [id number]  Date Admitted: 20  Location: [facility] 2EST 2012 ([facility] 2EST)  Attending: Maksim Avina      Post Partum Note:     DIMITRIOS WELCH is a 20y  s/p urgent primary  section POD #3 due to NRFHT. post partum complicated by PECwSF s/p MgSO4. Viable male infant at bedside.    SUBJECTIVE:  No acute events overnight. Pain better controlled with PRN medications. No problems with PO intake. Has had flatus but no BM. Denies N/V. Patient is having normal lochia which is decreasing.    She is breastfeeding and baby is latching on. She denies any HA, visual changes, SOB or RUQ pain. Pt reporting she wants to go home today.    She is voiding spontaneously,  but is needing to double void. Is experiencing some urgency.    OBJECTIVE:    Vital Signs Last 24 Hrs  T(C): 37 (23 Dec 2020 04:22), Max: 37 (22 Dec 2020 08:52)  T(F): 98.6 (23 Dec 2020 04:22), Max: 98.6 (22 Dec 2020 08:52)  HR: 91 (23 Dec 2020 04:22) (70 - 97)  BP: 119/76 (23 Dec 2020 04:22) (119/76 - 149/89)  RR: 20 (23 Dec 2020 04:22) (20 - 20)  SpO2: 100% (23 Dec 2020 04:22) (98% - 100%)    Physical exam:  General: AOx3, NAD.  Heart: RRR. S1S2.  Lungs: CTABL. Good airflow bilaterally.   Abdomen: +BS, Soft, markedly tender, no guarding or rebound tenderness, firm uterine fundus at umbilicus, the incision is clean dry and intact with dermabond. No erythema or discharge.  Ext: No DVT signs, warm extremities. 2+ reflexes LE bilaterally        LABS:                        8.4    19.94 )-----------( 387      ( 20 Dec 2020 20:25 )             28.4                             7.7    15.74 )-----------( 414      ( 22 Dec 2020 06:05 )             26.6

## 2020-12-23 NOTE — PROGRESS NOTE ADULT - ASSESSMENT
DIMITRIOS WELCH is a 20y  s/p urgent primary  section POD #2 due to NRFHT. post partum complicated by PECwSF s/p MgSO4.. Viable male infant at bedside.  - Pain controlled with prn medication  - Tolerating po   - + flatus  - + void  - hgb  6.6-->2u  --> 8.3 --> 8.4 (post op)-->7.3-->7.7  : Pt on PO iron.  - Lovenox for DVT prophylaxis   - Rh+  - Pt with male infant, declines circumcision   - Dispo: Home pending attending approval      DIMITRIOS WELCH is a 20y  s/p urgent primary  section POD #2 due to NRFHT. post partum complicated by PECwSF s/p MgSO4.. Viable male infant at bedside.  - BP controlled, on Procardia 30xl  - Pain controlled with prn medication  - Tolerating po   - + flatus  - + void  - hgb  6.6-->2u  --> 8.3 --> 8.4 (post op)-->7.3-->7.7  : Pt on PO iron.  - Lovenox for DVT prophylaxis   - Rh+  - Pt with male infant, declines circumcision   - Dispo: Home pending attending approval      DIMITRIOS WELCH is a 20y  s/p urgent primary  section POD #2 due to NRFHT. post partum complicated by PECwSF s/p MgSO4.. Viable male infant at bedside.  - BP controlled, on Procardia 30xl  - Pain controlled with prn medication  - Tolerating po   - + flatus  - + void  - hgb  6.6-->2u  --> 8.3 --> 8.4 (post op)-->7.3-->7.7  : Pt on PO iron.  - Lovenox for DVT prophylaxis   - Rh+  - Pt with male infant, declines circumcision - baby getting shannan done today  - Dispo: Home pending attending approval       I have read and agree with everything in the above note.     Janell Glover- PGY4

## 2020-12-23 NOTE — PROGRESS NOTE ADULT - ATTENDING COMMENTS
Patient seen and examined, pain not well controlled, support person states he does not want the patient to take opioids when he is not here, and has to leave today.  The patient is not ambulating besides up to the bathroom - overnight urinary retention likely due to the patient not wanting to get up to go to the bathroom.    VS and labs reviewed  Abdomen: soft no rebound/guarding, softly distended - incision c/d/i   Lower ext: no calf tenderness bilaterally  POD#2 s/p primary urgent csection - stable  patient strongly encourage to ambulate -requesting a walker  reviewed pain medication regimen  DVT ppx: sonja Perez MD
patient doing well  on magnesium for PEC until this afternoon, BPs stable   will titrate antihypertensives as needed  routine postop/pp care  Lana Perez MD
Patient's pain much better controlled  ambulating with walker without difficulty - likes the support with it  pt stable for discharge home today

## 2020-12-24 ENCOUNTER — NON-APPOINTMENT (OUTPATIENT)
Age: 20
End: 2020-12-24

## 2020-12-24 ENCOUNTER — EMERGENCY (EMERGENCY)
Facility: HOSPITAL | Age: 20
LOS: 1 days | Discharge: HOME | End: 2020-12-24
Attending: EMERGENCY MEDICINE
Payer: COMMERCIAL

## 2020-12-24 VITALS
TEMPERATURE: 100 F | DIASTOLIC BLOOD PRESSURE: 91 MMHG | HEIGHT: 64 IN | WEIGHT: 139.99 LBS | OXYGEN SATURATION: 99 % | RESPIRATION RATE: 20 BRPM | HEART RATE: 115 BPM | SYSTOLIC BLOOD PRESSURE: 159 MMHG

## 2020-12-24 VITALS — TEMPERATURE: 100 F

## 2020-12-24 LAB
ACANTHOCYTES BLD QL SMEAR: SLIGHT — SIGNIFICANT CHANGE UP
ALBUMIN SERPL ELPH-MCNC: 3 G/DL — LOW (ref 3.3–5.2)
ALP SERPL-CCNC: 119 U/L — SIGNIFICANT CHANGE UP (ref 40–120)
ALT FLD-CCNC: 15 U/L — SIGNIFICANT CHANGE UP
ANION GAP SERPL CALC-SCNC: 12 MMOL/L — SIGNIFICANT CHANGE UP (ref 5–17)
ANISOCYTOSIS BLD QL: SIGNIFICANT CHANGE UP
APTT BLD: 29.8 SEC — SIGNIFICANT CHANGE UP (ref 27.5–35.5)
AST SERPL-CCNC: 24 U/L — SIGNIFICANT CHANGE UP
BASOPHILS # BLD AUTO: 0.13 K/UL — SIGNIFICANT CHANGE UP (ref 0–0.2)
BASOPHILS NFR BLD AUTO: 0.9 % — SIGNIFICANT CHANGE UP (ref 0–2)
BILIRUB SERPL-MCNC: 0.4 MG/DL — SIGNIFICANT CHANGE UP (ref 0.4–2)
BUN SERPL-MCNC: 8 MG/DL — SIGNIFICANT CHANGE UP (ref 8–20)
CALCIUM SERPL-MCNC: 8.6 MG/DL — SIGNIFICANT CHANGE UP (ref 8.6–10.2)
CHLORIDE SERPL-SCNC: 103 MMOL/L — SIGNIFICANT CHANGE UP (ref 98–107)
CO2 SERPL-SCNC: 21 MMOL/L — LOW (ref 22–29)
CREAT SERPL-MCNC: 0.84 MG/DL — SIGNIFICANT CHANGE UP (ref 0.5–1.3)
ELLIPTOCYTES BLD QL SMEAR: SLIGHT — SIGNIFICANT CHANGE UP
EOSINOPHIL # BLD AUTO: 0.37 K/UL — SIGNIFICANT CHANGE UP (ref 0–0.5)
EOSINOPHIL NFR BLD AUTO: 2.6 % — SIGNIFICANT CHANGE UP (ref 0–6)
GLUCOSE SERPL-MCNC: 88 MG/DL — SIGNIFICANT CHANGE UP (ref 70–99)
HCT VFR BLD CALC: 26.6 % — LOW (ref 34.5–45)
HGB BLD-MCNC: 7.7 G/DL — LOW (ref 11.5–15.5)
HYPOCHROMIA BLD QL: SIGNIFICANT CHANGE UP
INR BLD: 1.11 RATIO — SIGNIFICANT CHANGE UP (ref 0.88–1.16)
LACTATE BLDV-MCNC: 0.8 MMOL/L — SIGNIFICANT CHANGE UP (ref 0.5–2)
LYMPHOCYTES # BLD AUTO: 15.8 % — SIGNIFICANT CHANGE UP (ref 13–44)
LYMPHOCYTES # BLD AUTO: 2.27 K/UL — SIGNIFICANT CHANGE UP (ref 1–3.3)
MACROCYTES BLD QL: SLIGHT — SIGNIFICANT CHANGE UP
MANUAL SMEAR VERIFICATION: SIGNIFICANT CHANGE UP
MCHC RBC-ENTMCNC: 20.3 PG — LOW (ref 27–34)
MCHC RBC-ENTMCNC: 28.9 GM/DL — LOW (ref 32–36)
MCV RBC AUTO: 70 FL — LOW (ref 80–100)
MICROCYTES BLD QL: SIGNIFICANT CHANGE UP
MONOCYTES # BLD AUTO: 1.14 K/UL — HIGH (ref 0–0.9)
MONOCYTES NFR BLD AUTO: 7.9 % — SIGNIFICANT CHANGE UP (ref 2–14)
NEUTROPHILS # BLD AUTO: 10.48 K/UL — HIGH (ref 1.8–7.4)
NEUTROPHILS NFR BLD AUTO: 72.8 % — SIGNIFICANT CHANGE UP (ref 43–77)
OVALOCYTES BLD QL SMEAR: SLIGHT — SIGNIFICANT CHANGE UP
PLAT MORPH BLD: NORMAL — SIGNIFICANT CHANGE UP
PLATELET # BLD AUTO: 519 K/UL — HIGH (ref 150–400)
POIKILOCYTOSIS BLD QL AUTO: SLIGHT — SIGNIFICANT CHANGE UP
POLYCHROMASIA BLD QL SMEAR: SIGNIFICANT CHANGE UP
POTASSIUM SERPL-MCNC: 3.9 MMOL/L — SIGNIFICANT CHANGE UP (ref 3.5–5.3)
POTASSIUM SERPL-SCNC: 3.9 MMOL/L — SIGNIFICANT CHANGE UP (ref 3.5–5.3)
PROT SERPL-MCNC: 6.9 G/DL — SIGNIFICANT CHANGE UP (ref 6.6–8.7)
PROTHROM AB SERPL-ACNC: 12.8 SEC — SIGNIFICANT CHANGE UP (ref 10.6–13.6)
RBC # BLD: 3.8 M/UL — SIGNIFICANT CHANGE UP (ref 3.8–5.2)
RBC # FLD: 27.4 % — HIGH (ref 10.3–14.5)
RBC BLD AUTO: ABNORMAL
SODIUM SERPL-SCNC: 136 MMOL/L — SIGNIFICANT CHANGE UP (ref 135–145)
STOMATOCYTES BLD QL SMEAR: SLIGHT — SIGNIFICANT CHANGE UP
WBC # BLD: 14.39 K/UL — HIGH (ref 3.8–10.5)
WBC # FLD AUTO: 14.39 K/UL — HIGH (ref 3.8–10.5)

## 2020-12-24 PROCEDURE — 99285 EMERGENCY DEPT VISIT HI MDM: CPT

## 2020-12-24 RX ORDER — SODIUM CHLORIDE 9 MG/ML
1950 INJECTION INTRAMUSCULAR; INTRAVENOUS; SUBCUTANEOUS ONCE
Refills: 0 | Status: COMPLETED | OUTPATIENT
Start: 2020-12-24 | End: 2020-12-24

## 2020-12-24 RX ORDER — ACETAMINOPHEN 500 MG
650 TABLET ORAL ONCE
Refills: 0 | Status: COMPLETED | OUTPATIENT
Start: 2020-12-24 | End: 2020-12-24

## 2020-12-24 RX ORDER — ONDANSETRON 4 MG/1
4 TABLET, ORALLY DISINTEGRATING ORAL 4 TIMES DAILY
Qty: 120 | Refills: 1 | Status: DISCONTINUED | COMMUNITY
Start: 2020-07-14 | End: 2020-12-24

## 2020-12-24 RX ORDER — DIPHENHYDRAMINE HCL 50 MG
50 CAPSULE ORAL ONCE
Refills: 0 | Status: COMPLETED | OUTPATIENT
Start: 2020-12-24 | End: 2020-12-24

## 2020-12-24 RX ADMIN — Medication 650 MILLIGRAM(S): at 22:56

## 2020-12-24 RX ADMIN — Medication 50 MILLIGRAM(S): at 22:58

## 2020-12-24 RX ADMIN — SODIUM CHLORIDE 1950 MILLILITER(S): 9 INJECTION INTRAMUSCULAR; INTRAVENOUS; SUBCUTANEOUS at 22:56

## 2020-12-24 RX ADMIN — Medication 50 MILLIGRAM(S): at 22:56

## 2020-12-24 NOTE — ED PROVIDER NOTE - PROGRESS NOTE DETAILS
Elsi WARREN: patient's triage BP is elevated, discussed with patient, was elevated at home as well. Endorses mild headache. OBGYN called. Elsi WARREN: cleared by OBGYN. Patient feeling better. Was never febrile. Ready for dc.

## 2020-12-24 NOTE — ED PROVIDER NOTE - OBJECTIVE STATEMENT
21 y/o F with PMHx of Asthma, Fibroids, Kidney stones, and Migraines presents to ED c/o itchy rashes on right inner thigh and right shoulder s/p being given pain medications s/p  delivery 4 days ago. Pt states she thinks she is having an allergic reaction to oxycodone. Pt was diagnosed with preeclampsia and is taking nifedipine. Denies sob or cough. 21 y/o F with PMHx of Asthma, Fibroids, Kidney stones, anemia, and Migraines presents to ED c/o itchy rashes on right inner thigh and right shoulder s/p being given pain medications s/p  delivery 4 days ago. Pt states she thinks she is having an allergic reaction to oxycodone. Pt was diagnosed with preeclampsia and is taking nifedipine. Denies sob, nausea, vomiting, blurry vision, vaginal discharge, cough, fever (99.6 orally in ED).

## 2020-12-24 NOTE — ED PROVIDER NOTE - PHYSICAL EXAMINATION
Gen: Well appearing in NAD  Head: NC/AT  Neck: trachea midline  Resp:  No distress, CTAB  CV: tachycardic, regular rhythm  Ext: no deformities  Neuro:  A&O appears non focal  Skin:  Warm and dry as visualized  Psych:  Normal affect and mood Gen: Well appearing in NAD  Head: NC/AT  Neck: trachea midline  Resp:  No distress, CTAB  CV: tachycardic, regular rhythm  GI: +stria. non-distended. Firm uterine fundus  Ext: no deformities  Neuro:  A&O appears non focal  Skin:  Warm and dry as visualized, no visualized rash (patient )  Psych:  Normal affect and mood

## 2020-12-24 NOTE — CONSULT NOTE ADULT - ASSESSMENT
19yo  s/p urgent primary  section for category II FHT POD #4, post partum course complicated by pre-eclampsia with severe features s/p MgSO4 prophylaxis. Patient presenting with allergic reaction.     1. Post partum  -Encourage to continue breastfeeding  -Can consider switching pain meds to tramadol   -VB wnl, incision c/d/i  -Routine post partum care    2. PEC  -Patient is s/p Mg prophylaxis, without new onset neuro symptoms or labs abnormalities. No indication to repeat Mg prophylaxis at this time.  -BPs 150s/90s in ED, patient reported BPs 140-130s/90s at home; is taking 30mg procardia XL daily  -BPs in the ED may be elevated due to pain, would not adjust BP meds based on these values  -Encouraged patient to continue taking BPs at home and bring a log to her OBGYN appt next Thursday  -Patient to continue taking procardia and call/return if intractable headache, RUQ pain or vision changes  -Encourage patient to call Dr. Avina in AM    D/W Dr. Shore

## 2020-12-24 NOTE — ED ADULT TRIAGE NOTE - CHIEF COMPLAINT QUOTE
patient gave birth to first baby 4 days ago, states she thinks she is having an allergic reaction to the pain medication that she was discharged home with (oxycodone 5 mg) because she has an itchy rash to right shoulder. code sepsis activated due to vital signs in triage.

## 2020-12-24 NOTE — ED PROVIDER NOTE - CLINICAL SUMMARY MEDICAL DECISION MAKING FREE TEXT BOX
Patient presents with pruritic rash. Also noted to have elevated oral temp and tachycardia and HTN (patient known pre-eclamptic). Plan for labs, IVF, benadryl, steroids, blood cx, GYN consult.

## 2020-12-24 NOTE — ED PROVIDER NOTE - CARE PROVIDER_API CALL
Zak Irizarry)  [facility]  [facility] [place] [address]  Phone: [phone]  Fax: [phone]  Follow Up Time: 4-6 Days

## 2020-12-24 NOTE — ED PROVIDER NOTE - NSFOLLOWUPINSTRUCTIONS_ED_ALL_ED_FT
- Follow up with your doctor within 2-3 days.   - Call your OB in the morning. Keep a log of your blood pressures.   - Consider seeing an allergist, see information above.   - Bring results with you to the appointment.   - For severe pain take tramadol. Stop taking oxycodone.   - Take Benadryl 25 mg every 6 hours as needed for itching.   - Return to the ED for any new or worsening symptoms.     Allergic Reaction    An allergic reaction is an abnormal reaction to a substance (allergen) by the body's defense system. Common allergens include medicines, food, insect bites or stings, and blood products. The body releases certain proteins into the blood that can cause a variety of symptoms such as an itchy rash, wheezing, swelling of the face/lips/tongue/throat, abdominal pain, nausea or vomiting. An allergic reaction is usually treated with medication. If your health care provider prescribed you an epinephrine injection device, make sure to keep it with you at all times.    SEEK IMMEDIATE MEDICAL CARE IF YOU HAVE ANY OF THE FOLLOWING SYMPTOMS: allergic reaction severe enough that required you to use epinephrine, tightness in your chest, swelling around your lips/tongue/throat, abdominal pain, vomiting or diarrhea, or lightheadedness/dizziness. These symptoms may represent a serious problem that is an emergency. Do not wait to see if the symptoms will go away. Use your auto-injector pen or anaphylaxis kit as you have been instructed. Call 911 and do not drive yourself to the hospital.

## 2020-12-24 NOTE — CONSULT NOTE ADULT - SUBJECTIVE AND OBJECTIVE BOX
21yo  s/p urgent primary  section for category II FHT POD #4, post partum course complicated by pre-eclampsia with severe features s/p MgSO4 prophylaxis. Patient reports that she presented to the ED today due to a rash which she thinks may have been a reaction to oxycodone. Otherwise, she is having pain around her incision. Denies SOB, CP, heavy VB, N/V, dysuria or burning with urination. She has sebastien having normal BMs, is sometimes double voiding on urination. Had a headache earlier, but quesada snot have one currently Denies vision changes or RUQ pain.     PMH: Kidney stones, Migraines, Fibroids, Asthma  PSH: CSx1  Intolerances: morphine (Vomiting; Nausea)  FAMILY HISTORY: Family history of cancer (Father)  Social History: Denies ETOH, smoking and drugs  POB/GYN Hx: s/p pCS    Vital Signs:  Vital Signs Last 24 Hrs  T(C): 37.6 (24 Dec 2020 21:41), Max: 37.6 (24 Dec 2020 21:41)  T(F): 99.6 (24 Dec 2020 21:41), Max: 99.6 (24 Dec 2020 21:41)  HR: 115 (24 Dec 2020 21:41) (115 - 115)  BP: 159/91 (24 Dec 2020 21:41) (159/91 - 159/91)  RR: 20 (24 Dec 2020 21:41) (20 - 20)  SpO2: 99% (24 Dec 2020 21:41) (99% - 99%)    Physical Exam:  General: NAD  CVS: RRR, +S1/S2  Lungs: CTAB, no wheeze, ronchi or rales.   Breast: No tenderness or abnormal discharge.  Abdomen: soft, mildly tender, uterine fundus firm. incision c/d/i without signs of erythema or separation   Pelvic: Deferred   Ext: No cyanosis, edema or calf tenderness b/l    Labs:                    7.7    14.39 )-----------( 519      ( 24 Dec 2020 22:14 )             26.6   12    136  |  103  |  8.0  ----------------------------<  88  3.9   |  21.0<L>  |  0.84    Ca    8.6      24 Dec 2020 22:14    TPro  6.9  /  Alb  3.0<L>  /  TBili  0.4  /  DBili  x   /  AST  24  /  ALT  15  /  AlkPhos  119  12-24    PT/INR - ( 24 Dec 2020 22:14 )   PT: 12.8 sec;   INR: 1.11 ratio      PTT - ( 24 Dec 2020 22:14 )  PTT:29.8 sec

## 2020-12-24 NOTE — ED PROVIDER NOTE - PATIENT PORTAL LINK FT
You can access the FollowMyHealth Patient Portal offered by [facility] by registering at the following website: [email or web address]. By joining [employer]’s FollowMyHealth portal, you will also be able to view your health information using other applications (apps) compatible with our system.

## 2020-12-25 LAB
APPEARANCE UR: CLEAR — SIGNIFICANT CHANGE UP
BACTERIA # UR AUTO: ABNORMAL
BILIRUB UR-MCNC: NEGATIVE — SIGNIFICANT CHANGE UP
COLOR SPEC: YELLOW — SIGNIFICANT CHANGE UP
DIFF PNL FLD: ABNORMAL
EPI CELLS # UR: SIGNIFICANT CHANGE UP
GLUCOSE UR QL: NEGATIVE MG/DL — SIGNIFICANT CHANGE UP
KETONES UR-MCNC: ABNORMAL
LEUKOCYTE ESTERASE UR-ACNC: ABNORMAL
NITRITE UR-MCNC: NEGATIVE — SIGNIFICANT CHANGE UP
PH UR: 8 — SIGNIFICANT CHANGE UP (ref 5–8)
PROT UR-MCNC: NEGATIVE MG/DL — SIGNIFICANT CHANGE UP
RBC CASTS # UR COMP ASSIST: ABNORMAL /HPF (ref 0–4)
SP GR SPEC: 1.01 — SIGNIFICANT CHANGE UP (ref 1.01–1.02)
UROBILINOGEN FLD QL: NEGATIVE MG/DL — SIGNIFICANT CHANGE UP
WBC UR QL: SIGNIFICANT CHANGE UP

## 2020-12-25 PROCEDURE — 85730 THROMBOPLASTIN TIME PARTIAL: CPT

## 2020-12-25 PROCEDURE — 87040 BLOOD CULTURE FOR BACTERIA: CPT

## 2020-12-25 PROCEDURE — 85610 PROTHROMBIN TIME: CPT

## 2020-12-25 PROCEDURE — 85025 COMPLETE CBC W/AUTO DIFF WBC: CPT

## 2020-12-25 PROCEDURE — 99284 EMERGENCY DEPT VISIT MOD MDM: CPT | Mod: 25

## 2020-12-25 PROCEDURE — 36415 COLL VENOUS BLD VENIPUNCTURE: CPT

## 2020-12-25 PROCEDURE — 87086 URINE CULTURE/COLONY COUNT: CPT

## 2020-12-25 PROCEDURE — 81001 URINALYSIS AUTO W/SCOPE: CPT

## 2020-12-25 PROCEDURE — 80053 COMPREHEN METABOLIC PANEL: CPT

## 2020-12-25 PROCEDURE — 83605 ASSAY OF LACTIC ACID: CPT

## 2020-12-25 RX ORDER — TRAMADOL HYDROCHLORIDE 50 MG/1
1 TABLET ORAL
Qty: 9 | Refills: 0
Start: 2020-12-25 | End: 2020-12-27

## 2020-12-25 NOTE — ED ADULT NURSE NOTE - OBJECTIVE STATEMENT
Pt AAOX3, pt c/o itchy rashes on right inner thigh and right shoulder s/p being given pain medications s/p  delivery 4 days ago. Pt states she thinks she is having an allergic reaction to oxycodone. Pt was diagnosed with preeclampsia and is taking nifedipine. Denies sob, nausea, vomiting, blurry vision, vaginal discharge, cough, fever (99.6 orally in ED). pt respirations even and unlabored, pt abdomen soft, nondistended, and tender, pt able to ,move all extremities well

## 2020-12-26 LAB
CULTURE RESULTS: SIGNIFICANT CHANGE UP
SPECIMEN SOURCE: SIGNIFICANT CHANGE UP

## 2020-12-27 ENCOUNTER — NON-APPOINTMENT (OUTPATIENT)
Age: 20
End: 2020-12-27

## 2020-12-28 DIAGNOSIS — Z34.00 ENCOUNTER FOR SUPERVISION OF NORMAL FIRST PREGNANCY, UNSPECIFIED TRIMESTER: ICD-10-CM

## 2020-12-28 DIAGNOSIS — O28.0 ABNORMAL HEMATOLOGICAL FINDING ON ANTENATAL SCREENING OF MOTHER: ICD-10-CM

## 2020-12-28 DIAGNOSIS — M79.662 PAIN IN RIGHT LOWER LEG: ICD-10-CM

## 2020-12-28 DIAGNOSIS — O09.899 ABNORMAL HEMATOLOGICAL FINDING ON ANTENATAL SCREENING OF MOTHER: ICD-10-CM

## 2020-12-28 DIAGNOSIS — M79.661 PAIN IN RIGHT LOWER LEG: ICD-10-CM

## 2020-12-29 ENCOUNTER — NON-APPOINTMENT (OUTPATIENT)
Age: 20
End: 2020-12-29

## 2020-12-29 LAB
CULTURE RESULTS: SIGNIFICANT CHANGE UP
CULTURE RESULTS: SIGNIFICANT CHANGE UP
SPECIMEN SOURCE: SIGNIFICANT CHANGE UP
SPECIMEN SOURCE: SIGNIFICANT CHANGE UP

## 2020-12-30 LAB — SURGICAL PATHOLOGY STUDY: SIGNIFICANT CHANGE UP

## 2020-12-31 ENCOUNTER — APPOINTMENT (OUTPATIENT)
Dept: OBGYN | Facility: CLINIC | Age: 20
End: 2020-12-31
Payer: MEDICAID

## 2020-12-31 VITALS — WEIGHT: 156.5 LBS | SYSTOLIC BLOOD PRESSURE: 118 MMHG | DIASTOLIC BLOOD PRESSURE: 80 MMHG

## 2020-12-31 PROCEDURE — 99072 ADDL SUPL MATRL&STAF TM PHE: CPT

## 2020-12-31 PROCEDURE — 99213 OFFICE O/P EST LOW 20 MIN: CPT | Mod: TH

## 2020-12-31 NOTE — HISTORY OF PRESENT ILLNESS
[Postpartum Follow Up] : postpartum follow up [Complications:___] : complications include: [unfilled] [Preeclampsia] : preeclampsia [Primary C/S] : delivered by  section [Clean/Dry/Intact] : clean, dry and intact [Doing Well] : is doing well [No Sign of Infection] : is showing no signs of infection [Excellent Pain Control] : has excellent pain control [None] : None [Breastfeeding] : currently nursing [Erythema] : not erythematous

## 2021-01-01 NOTE — OB RN PATIENT PROFILE - PRO RUBELLA INFANT
Lactation Progress Note      Data:   Initial lactation consult with primip. MOB states that infant fed about 1 hour after delivery with nipple shield. States that she has flat nipples. At time of consult infant with feeding cues present. LC to assist with infant feeding and provide bf education and support. Stool noted in diaper. Action: Introduced self to patient as Lactation RN, name and phone number written on white board in room. Infant in crib. LC changed infant stool diaper. MOB was given instruction on how to place infant to breast using cross cradle hold. Infant quiet alert with feeding cues present. MOB feeling nauseaus at start of feeding attempt, and begins vomiting. No Gallegos RN was notified, and brought mob some meds. After about 10 minutes LC was able to assist again with bf. Infant latching on/off breast with several suck burst noted along with gtts of colostrum given. Infant remains at the breast with same pattern for about 15 min. At that time infant sleeping at breast. Lc assisted in swaddling infant and placing back in crib so that mob can rest. Reviewed with mother what to expect over the next  24-48 hours with infant feedings, infant output, and breast care. Reviewed infant feeding cues and encouraged mother to allow infant to breast feed on demand, a minimum of 8-12 times a day after the first day of life. Binder and breast feeding log reviewed, all questions answered. Mother instructed to call Lactation nurse for F/U care as needed. Response: MOB verbalizes understanding of bf education that was provided. Will need f/u care to reinforce positioning infant to breast, and need for nipple shield. immune

## 2021-01-06 ENCOUNTER — NON-APPOINTMENT (OUTPATIENT)
Age: 21
End: 2021-01-06

## 2021-01-06 ENCOUNTER — APPOINTMENT (OUTPATIENT)
Dept: OBGYN | Facility: CLINIC | Age: 21
End: 2021-01-06
Payer: MEDICAID

## 2021-01-06 VITALS — TEMPERATURE: 98.8 F | HEIGHT: 64 IN | DIASTOLIC BLOOD PRESSURE: 75 MMHG | SYSTOLIC BLOOD PRESSURE: 110 MMHG

## 2021-01-06 PROCEDURE — 99072 ADDL SUPL MATRL&STAF TM PHE: CPT

## 2021-01-06 PROCEDURE — 99214 OFFICE O/P EST MOD 30 MIN: CPT | Mod: TH

## 2021-01-06 NOTE — HISTORY OF PRESENT ILLNESS
[Postpartum Follow Up] : postpartum follow up [Complications:___] : complications include: [unfilled] [Preeclampsia] : preeclampsia [Primary C/S] : delivered by  section [Breastfeeding] : currently nursing [Incisional Pain] : incisional pain [Clean/Dry/Intact] : clean, dry and intact [No Sign of Infection] : is showing no signs of infection [None] : None [Abdominal Pain] : abdominal pain [Erythema] : not erythematous

## 2021-01-11 ENCOUNTER — NON-APPOINTMENT (OUTPATIENT)
Age: 21
End: 2021-01-11

## 2021-01-19 ENCOUNTER — NON-APPOINTMENT (OUTPATIENT)
Age: 21
End: 2021-01-19

## 2021-01-22 ENCOUNTER — ASOB RESULT (OUTPATIENT)
Age: 21
End: 2021-01-22

## 2021-01-22 ENCOUNTER — APPOINTMENT (OUTPATIENT)
Dept: ANTEPARTUM | Facility: CLINIC | Age: 21
End: 2021-01-22
Payer: MEDICAID

## 2021-01-22 PROCEDURE — 76856 US EXAM PELVIC COMPLETE: CPT | Mod: 59

## 2021-01-22 PROCEDURE — 99072 ADDL SUPL MATRL&STAF TM PHE: CPT

## 2021-01-22 PROCEDURE — 76830 TRANSVAGINAL US NON-OB: CPT | Mod: 59

## 2021-01-25 ENCOUNTER — NON-APPOINTMENT (OUTPATIENT)
Age: 21
End: 2021-01-25

## 2021-01-29 ENCOUNTER — NON-APPOINTMENT (OUTPATIENT)
Age: 21
End: 2021-01-29

## 2021-01-29 ENCOUNTER — EMERGENCY (EMERGENCY)
Facility: HOSPITAL | Age: 21
LOS: 1 days | Discharge: HOME | End: 2021-01-29
Attending: EMERGENCY MEDICINE
Payer: COMMERCIAL

## 2021-01-29 VITALS
OXYGEN SATURATION: 100 % | SYSTOLIC BLOOD PRESSURE: 120 MMHG | HEART RATE: 95 BPM | HEIGHT: 64 IN | RESPIRATION RATE: 18 BRPM | DIASTOLIC BLOOD PRESSURE: 81 MMHG | TEMPERATURE: 98 F | WEIGHT: 149.91 LBS

## 2021-01-29 VITALS
RESPIRATION RATE: 18 BRPM | TEMPERATURE: 98 F | SYSTOLIC BLOOD PRESSURE: 110 MMHG | HEART RATE: 75 BPM | OXYGEN SATURATION: 100 % | DIASTOLIC BLOOD PRESSURE: 67 MMHG

## 2021-01-29 DIAGNOSIS — N93.9 ABNORMAL UTERINE AND VAGINAL BLEEDING, UNSPECIFIED: ICD-10-CM

## 2021-01-29 LAB
ACANTHOCYTES BLD QL SMEAR: SLIGHT — SIGNIFICANT CHANGE UP
ANION GAP SERPL CALC-SCNC: 13 MMOL/L — SIGNIFICANT CHANGE UP (ref 5–17)
ANISOCYTOSIS BLD QL: SLIGHT — SIGNIFICANT CHANGE UP
APTT BLD: 29.8 SEC — SIGNIFICANT CHANGE UP (ref 27.5–35.5)
BASOPHILS # BLD AUTO: 0.07 K/UL — SIGNIFICANT CHANGE UP (ref 0–0.2)
BASOPHILS NFR BLD AUTO: 1.4 % — SIGNIFICANT CHANGE UP (ref 0–2)
BLD GP AB SCN SERPL QL: SIGNIFICANT CHANGE UP
BUN SERPL-MCNC: 5 MG/DL — LOW (ref 8–20)
CALCIUM SERPL-MCNC: 8.8 MG/DL — SIGNIFICANT CHANGE UP (ref 8.6–10.2)
CHLORIDE SERPL-SCNC: 106 MMOL/L — SIGNIFICANT CHANGE UP (ref 98–107)
CO2 SERPL-SCNC: 22 MMOL/L — SIGNIFICANT CHANGE UP (ref 22–29)
CREAT SERPL-MCNC: 0.47 MG/DL — LOW (ref 0.5–1.3)
DACRYOCYTES BLD QL SMEAR: SLIGHT — SIGNIFICANT CHANGE UP
EOSINOPHIL # BLD AUTO: 0.37 K/UL — SIGNIFICANT CHANGE UP (ref 0–0.5)
EOSINOPHIL NFR BLD AUTO: 7.3 % — HIGH (ref 0–6)
GLUCOSE SERPL-MCNC: 86 MG/DL — SIGNIFICANT CHANGE UP (ref 70–99)
HCG SERPL-ACNC: <4 MIU/ML — SIGNIFICANT CHANGE UP
HCT VFR BLD CALC: 27.2 % — LOW (ref 34.5–45)
HGB BLD-MCNC: 7.9 G/DL — LOW (ref 11.5–15.5)
HYPOCHROMIA BLD QL: SLIGHT — SIGNIFICANT CHANGE UP
IMM GRANULOCYTES NFR BLD AUTO: 0.2 % — SIGNIFICANT CHANGE UP (ref 0–1.5)
INR BLD: 1.11 RATIO — SIGNIFICANT CHANGE UP (ref 0.88–1.16)
LYMPHOCYTES # BLD AUTO: 2.24 K/UL — SIGNIFICANT CHANGE UP (ref 1–3.3)
LYMPHOCYTES # BLD AUTO: 44.2 % — HIGH (ref 13–44)
MANUAL SMEAR VERIFICATION: SIGNIFICANT CHANGE UP
MCHC RBC-ENTMCNC: 22.5 PG — LOW (ref 27–34)
MCHC RBC-ENTMCNC: 29 GM/DL — LOW (ref 32–36)
MCV RBC AUTO: 77.5 FL — LOW (ref 80–100)
MICROCYTES BLD QL: SLIGHT — SIGNIFICANT CHANGE UP
MONOCYTES # BLD AUTO: 0.35 K/UL — SIGNIFICANT CHANGE UP (ref 0–0.9)
MONOCYTES NFR BLD AUTO: 6.9 % — SIGNIFICANT CHANGE UP (ref 2–14)
NEUTROPHILS # BLD AUTO: 2.03 K/UL — SIGNIFICANT CHANGE UP (ref 1.8–7.4)
NEUTROPHILS NFR BLD AUTO: 40 % — LOW (ref 43–77)
OVALOCYTES BLD QL SMEAR: SLIGHT — SIGNIFICANT CHANGE UP
PLAT MORPH BLD: NORMAL — SIGNIFICANT CHANGE UP
PLATELET # BLD AUTO: 377 K/UL — SIGNIFICANT CHANGE UP (ref 150–400)
POIKILOCYTOSIS BLD QL AUTO: SLIGHT — SIGNIFICANT CHANGE UP
POLYCHROMASIA BLD QL SMEAR: SLIGHT — SIGNIFICANT CHANGE UP
POTASSIUM SERPL-MCNC: 4.1 MMOL/L — SIGNIFICANT CHANGE UP (ref 3.5–5.3)
POTASSIUM SERPL-SCNC: 4.1 MMOL/L — SIGNIFICANT CHANGE UP (ref 3.5–5.3)
PROTHROM AB SERPL-ACNC: 12.8 SEC — SIGNIFICANT CHANGE UP (ref 10.6–13.6)
RBC # BLD: 3.51 M/UL — LOW (ref 3.8–5.2)
RBC # FLD: 25.2 % — HIGH (ref 10.3–14.5)
RBC BLD AUTO: ABNORMAL
SCHISTOCYTES BLD QL AUTO: SLIGHT — SIGNIFICANT CHANGE UP
SODIUM SERPL-SCNC: 141 MMOL/L — SIGNIFICANT CHANGE UP (ref 135–145)
WBC # BLD: 5.07 K/UL — SIGNIFICANT CHANGE UP (ref 3.8–10.5)
WBC # FLD AUTO: 5.07 K/UL — SIGNIFICANT CHANGE UP (ref 3.8–10.5)

## 2021-01-29 PROCEDURE — 76830 TRANSVAGINAL US NON-OB: CPT

## 2021-01-29 PROCEDURE — 93005 ELECTROCARDIOGRAM TRACING: CPT

## 2021-01-29 PROCEDURE — 99285 EMERGENCY DEPT VISIT HI MDM: CPT

## 2021-01-29 PROCEDURE — 36415 COLL VENOUS BLD VENIPUNCTURE: CPT

## 2021-01-29 PROCEDURE — 99284 EMERGENCY DEPT VISIT MOD MDM: CPT | Mod: 25

## 2021-01-29 PROCEDURE — 85730 THROMBOPLASTIN TIME PARTIAL: CPT

## 2021-01-29 PROCEDURE — 84702 CHORIONIC GONADOTROPIN TEST: CPT

## 2021-01-29 PROCEDURE — 80048 BASIC METABOLIC PNL TOTAL CA: CPT

## 2021-01-29 PROCEDURE — 85025 COMPLETE CBC W/AUTO DIFF WBC: CPT

## 2021-01-29 PROCEDURE — 76856 US EXAM PELVIC COMPLETE: CPT | Mod: 26,59

## 2021-01-29 PROCEDURE — 76830 TRANSVAGINAL US NON-OB: CPT | Mod: 26

## 2021-01-29 PROCEDURE — 86900 BLOOD TYPING SEROLOGIC ABO: CPT

## 2021-01-29 PROCEDURE — 85610 PROTHROMBIN TIME: CPT

## 2021-01-29 PROCEDURE — 86901 BLOOD TYPING SEROLOGIC RH(D): CPT

## 2021-01-29 PROCEDURE — 86850 RBC ANTIBODY SCREEN: CPT

## 2021-01-29 PROCEDURE — 93010 ELECTROCARDIOGRAM REPORT: CPT

## 2021-01-29 PROCEDURE — 96374 THER/PROPH/DIAG INJ IV PUSH: CPT

## 2021-01-29 PROCEDURE — 76856 US EXAM PELVIC COMPLETE: CPT

## 2021-01-29 RX ORDER — ONDANSETRON 8 MG/1
4 TABLET, FILM COATED ORAL ONCE
Refills: 0 | Status: COMPLETED | OUTPATIENT
Start: 2021-01-29 | End: 2021-01-29

## 2021-01-29 RX ORDER — ACETAMINOPHEN 500 MG
650 TABLET ORAL ONCE
Refills: 0 | Status: COMPLETED | OUTPATIENT
Start: 2021-01-29 | End: 2021-01-29

## 2021-01-29 RX ADMIN — ONDANSETRON 4 MILLIGRAM(S): 8 TABLET, FILM COATED ORAL at 14:30

## 2021-01-29 RX ADMIN — Medication 650 MILLIGRAM(S): at 14:24

## 2021-01-29 NOTE — ED PROVIDER NOTE - CARE PROVIDER_API CALL
Maksim Avina (DO)  Obstetrics and Gynecology  [address] [facility], [address]nd [place] [address]  Phone: [phone]  Fax: [phone]  Follow Up Time:

## 2021-01-29 NOTE — ED PROVIDER NOTE - OBJECTIVE STATEMENT
20 yo female with c/o heavy vaginal bleeding x 9 days. s/p c section December 20th at 37 weeks with Dr Greene. 9 days ago started having heavy vaginal bleeding and passing clots. Changing every hour. Hx anemia. Had two transfusions after section. Did not have a reaction to transfusions. States no appetite since delivery. Feels weak. Pain LLQ. No other complaints.  Med hx Asthma    Fibroids    Kidney stones    Migraines

## 2021-01-29 NOTE — CONSULT NOTE ADULT - ASSESSMENT
Pt is a 21y  POD#40 s/p primary C/S LMP 21 presenting to the ED due to normal postpartum vaginal bleeding in context of hx of uterine fibroids. VSS.    Plan:  -F/u outpatient with Dr. Avina regarding contraception options to minimize vaginal bleeding Pt is a 21y  POD#40 s/p primary C/S LMP 21 presenting to the ED due to vaginal bleeding in context of hx of uterine fibroids.   - Vitals wnl  - CBC reviewed, known h/o asymptomatic anemia, hgb/hct stable  - No concern for hemorrhage at this time or need for emergent gyn intervention  - Discussed outpatient management amd hormonal birth control options to slow vaginal bleeding  - Cleared from gyn standpoint. Encouraged follow-up with Dr. Avina

## 2021-01-29 NOTE — ED PROVIDER NOTE - CLINICAL SUMMARY MEDICAL DECISION MAKING FREE TEXT BOX
pt with fibroids asthma anemia presents with heavy vag bleeding x 9 days also vague occasional cp  and LLQ discomfort  first menses since delivery in December via c section  pe as documented  labs imaging gyn consult meds pt with fibroids asthma anemia presents with heavy vag bleeding x 9 days also vague occasional cp  and LLQ discomfort  first menses since delivery in December via c section  pe as documented  labs imaging gyn consult meds  hgb stable and gyn cleared for outpt fu

## 2021-01-29 NOTE — ED PROVIDER NOTE - NSFOLLOWUPINSTRUCTIONS_ED_ALL_ED_FT
Please make sure to follow up with Dr Avina  Acetaminophen over the counter, 325 milligram each pill, take two every 4-6 hours for pain  Return for any problems of changes in  your condition

## 2021-01-29 NOTE — ED ADULT TRIAGE NOTE - CHIEF COMPLAINT QUOTE
Pt states "I had a baby on December 20th and I've been bleeding for the last 9 days and having some chest pain so my OBGYN told me to come in", c/o intermittent chest pain, denies SOB, c/o pain "upon pushing on one side of my incision", reports "going through a pad every hour and a half"

## 2021-01-29 NOTE — CONSULT NOTE ADULT - SUBJECTIVE AND OBJECTIVE BOX
Pt is a 21y  POD#40 s/p primary C/S LMP 21 presenting to the ED due to 9 days of heavy vaginal bleeding and chest pain. Pt reports consistent vaginal bleeding requiring pad change every 1 hour with quarter-sized clots. She reports the bleeding is heavier than her normal periods. Pt also reports chest pain starting today, but says she is no longer feeling pain. Pt reports intermittent headache for the past few days. Pt has been breastfeeding since delivery. Pt denies dizziness, n/v, fevers, chills.    OBHx: C/S 18 due to NRFHT, developed preeclampsia with severe features postpartum s/p MgSO4 and procardia 30mg  GYN Hx: +multiple fibroids, history of LGSIL on Pap smear, -cysts, no STI history. Periods regular every 26d, lasting about 4d  PMH: anemia, kidney stones, migraines  Meds: none  Allergies: oxycodone (rash), morphine (vomiting)  Social hx: denies toxic habits x3    Vital Signs Last 24 Hrs  T(C): 36.9 (2021 16:21), Max: 36.9 (2021 16:21)  T(F): 98.4 (2021 16:21), Max: 98.4 (2021 16:21)  HR: 75 (2021 16:21) (75 - 95)  BP: 110/67 (2021 16:21) (110/67 - 120/81)  RR: 18 (2021 16:21) (18 - 18)  SpO2: 100% (2021 16:21) (100% - 100%)    Physical Exam:  General: A&Ox3, in no acute distress  Resp: CTAB  CV: +S1/S2, no murmurs  Abd: +ttp in lower abdomen/pelvis,  incision site c/d/i  : external genitalia normal, +light active bleeding from cervix, no clots or lesions identified    < from: US Transvaginal (21 @ 16:08) >  IMPRESSION:   scar is noted at the anterior lower uterus with echogenic foci, possibly sutures. Trace fluid is seen within the scar region. Multiple fibroids are noted measuring 3.5 cm anteriorly and to the LEFT as well as 2.3 cm RIGHT posteriorly.          < end of copied text >   Pt is a 21y  POD#40 s/p primary C/S complicated by PECwSF. LMP 21 presenting to the ED due to 9 days of heavy vaginal bleeding and chest pain. Pt reports consistent vaginal bleeding requiring pad change every 1 hour with quarter-sized clots. She reports the bleeding is heavier than her normal periods. Pt also reports chest pain starting today, but says she is no longer feeling pain. Pt reports intermittent headache for the past few days. Pt has been breastfeeding since delivery. Pt denies dizziness, fevers, chills, sob, visual changes, RUQ or epigastric pain.    OBHx: C/S 18 due to NRFHT, developed preeclampsia with severe features postpartum s/p MgSO4 and procardia 30mg  GYN Hx: +multiple fibroids, history of LGSIL on Pap smear, -cysts, no STI history. Periods regular every 26d, lasting about 4d  PMH: anemia, kidney stones, migraines  Meds: none  Allergies: oxycodone (rash), morphine (vomiting)  Social hx: denies toxic habits x3    Vital Signs Last 24 Hrs  T(C): 36.9 (2021 16:21), Max: 36.9 (2021 16:21)  T(F): 98.4 (2021 16:21), Max: 98.4 (2021 16:21)  HR: 75 (2021 16:21) (75 - 95)  BP: 110/67 (2021 16:21) (110/67 - 120/81)  RR: 18 (2021 16:21) (18 - 18)  SpO2: 100% (2021 16:21) (100% - 100%)    Physical Exam:  General: A&Ox3, in no acute distress  Resp: CTAB  CV: +S1/S2, no murmurs  Abd: +ttp in left lower abdomen,  incision site c/d/i  SSE: external genitalia normal, dark blood in vaginal vault with small clots, no active bleeding from cervix, no cervical or vaginal lesions identified                          7.9    5.07  )-----------( 377      ( 2021 14:28 )             27.2       < from: US Transvaginal (21 @ 16:08) >  IMPRESSION:   scar is noted at the anterior lower uterus with echogenic foci, possibly sutures. Trace fluid is seen within the scar region. Multiple fibroids are noted measuring 3.5 cm anteriorly and to the LEFT as well as 2.3 cm RIGHT posteriorly.          < end of copied text >

## 2021-01-29 NOTE — ED PROVIDER NOTE - CARE PROVIDERS DIRECT ADDRESSES
[email or web address]

## 2021-01-29 NOTE — ED PROVIDER NOTE - PATIENT PORTAL LINK FT
You can access the FollowMyHealth Patient Portal offered by [facility] by registering at the following website: [email or web address]. By joining [facility]’s FollowMyHealth portal, you will also be able to view your health information using other applications (apps) compatible with our system.

## 2021-02-11 ENCOUNTER — APPOINTMENT (OUTPATIENT)
Dept: OBGYN | Facility: CLINIC | Age: 21
End: 2021-02-11
Payer: MEDICAID

## 2021-02-11 VITALS — WEIGHT: 141.56 LBS | DIASTOLIC BLOOD PRESSURE: 84 MMHG | SYSTOLIC BLOOD PRESSURE: 112 MMHG

## 2021-02-11 DIAGNOSIS — D25.9 LEIOMYOMA OF UTERUS, UNSPECIFIED: ICD-10-CM

## 2021-02-11 PROCEDURE — 99213 OFFICE O/P EST LOW 20 MIN: CPT | Mod: TH

## 2021-02-11 PROCEDURE — 99072 ADDL SUPL MATRL&STAF TM PHE: CPT

## 2021-02-11 NOTE — HISTORY OF PRESENT ILLNESS
[Postpartum Follow Up] : postpartum follow up [Complications:___] : complications include: [unfilled] [Preeclampsia] : preeclampsia [Primary C/S] : delivered by  section [Breastfeeding] : currently nursing [S/Sx PP Depression] : no signs/symptoms of postpartum depression [Clean/Dry/Intact] : clean, dry and intact [Erythema] : not erythematous [Back to Normal] : is still enlarged [Mild] : mild vaginal bleeding [Normal] : the vagina was normal [Cervix Sample Taken] : cervical sample not taken for a Pap smear [Examination Of The Breasts] : breasts are normal [Doing Well] : is doing well [No Sign of Infection] : is showing no signs of infection [Excellent Pain Control] : has excellent pain control [None] : None

## 2021-02-24 ENCOUNTER — APPOINTMENT (OUTPATIENT)
Dept: ULTRASOUND IMAGING | Facility: CLINIC | Age: 21
End: 2021-02-24
Payer: MEDICAID

## 2021-02-24 ENCOUNTER — OUTPATIENT (OUTPATIENT)
Dept: OUTPATIENT SERVICES | Facility: HOSPITAL | Age: 21
LOS: 1 days | End: 2021-02-24

## 2021-02-24 DIAGNOSIS — D25.9 LEIOMYOMA OF UTERUS, UNSPECIFIED: ICD-10-CM

## 2021-02-24 LAB — HCG SERPL-MCNC: <1 MIU/ML

## 2021-02-24 PROCEDURE — 58340 CATHETER FOR HYSTEROGRAPHY: CPT

## 2021-02-24 PROCEDURE — 76831 ECHO EXAM UTERUS: CPT | Mod: 26

## 2021-03-08 ENCOUNTER — APPOINTMENT (OUTPATIENT)
Dept: OBGYN | Facility: CLINIC | Age: 21
End: 2021-03-08
Payer: MEDICAID

## 2021-03-08 VITALS — DIASTOLIC BLOOD PRESSURE: 78 MMHG | WEIGHT: 139 LBS | SYSTOLIC BLOOD PRESSURE: 116 MMHG

## 2021-03-08 PROCEDURE — 99212 OFFICE O/P EST SF 10 MIN: CPT

## 2021-03-08 PROCEDURE — 99072 ADDL SUPL MATRL&STAF TM PHE: CPT

## 2021-03-08 NOTE — REASON FOR VISIT
[Follow-Up] : a follow-up evaluation of [FreeTextEntry2] : an abnormal Pap smear. The patient presents for a repeat Pap smear.

## 2021-03-12 LAB — CYTOLOGY CVX/VAG DOC THIN PREP: NORMAL

## 2021-03-19 ENCOUNTER — APPOINTMENT (OUTPATIENT)
Dept: OBGYN | Facility: CLINIC | Age: 21
End: 2021-03-19

## 2021-03-31 ENCOUNTER — TRANSCRIPTION ENCOUNTER (OUTPATIENT)
Age: 21
End: 2021-03-31

## 2021-05-16 ENCOUNTER — TRANSCRIPTION ENCOUNTER (OUTPATIENT)
Age: 21
End: 2021-05-16

## 2021-06-08 NOTE — ED PROVIDER NOTE - NS ED MD EM SELECTION
[address] [facility]        Pt Name: Deanna Choudhary  MRN: [id number]  [place] 1997  Date of evaluation: 6/8/2021  Provider: Abdi Bedolla PA-C  PCP: Roberto Haro MD  Note Started: 7:24 PM EDT        I have seen and evaluated this patient with my supervising physician No att. providers found. CHIEF COMPLAINT       Chief Complaint   Patient presents with    Post-op Problem     Pt c/o passing a bunch of blood clots after having a D&C in [address] [place] [address] then found to have residual tissue and then had a hysterscopy back in May. Pt passing even more blood clots now. She was able to see provider today, but he told her to take her hormones. Pt still having pain. HISTORY OF PRESENT ILLNESS   (Location, Timing/Onset, Context/Setting, Quality, Duration, Modifying Factors, Severity, Associated Signs and Symptoms)  Note limiting factors. Deanna Choudhary is a 30 y.o. female who presents w to the emergency department today for evaluation for lower abdominal pain, and vaginal bleeding. The patient states that she underwent a D&C in February of this year, and she states that she did have a small amount of clots after this. The patient states that she was then taken back to the OR on May 17 of 2021 for concern of retained tissue. The patient states that since then she has had some intermittent vaginal bleeding. The patient states that she saw them 2 weeks ago, for vaginal bleeding and she was given ibuprofen. She states that she was taking this but she continues to have increasing pain. She states that she started over the past 2 days with increasing vaginal bleeding, and lower abdominal pelvic pain and cramping. She states that she saw another physician through the GYN practice although she states that Dr. Michael Almeida did her surgery, and she states that she was told to take oral contraceptives.   Patient states that she has had continued bleeding, she states that she is gone through 5 pads and 2 tampons today. The patient states that her pain is sharp, constant. She denies any vaginal discharge. She denies any nausea, vomiting, or diarrhea. No dysuria. No chest pain or shortness of breath. Nursing Notes were all reviewed and agreed with or any disagreements were addressed in the HPI. REVIEW OF SYSTEMS    (2-9 systems for level 4, 10 or more for level 5)     Review of Systems   Constitutional: Negative for activity change, appetite change, chills and fever. HENT: Negative for congestion and rhinorrhea. Respiratory: Negative for cough and shortness of breath. Cardiovascular: Negative for chest pain. Gastrointestinal: Positive for abdominal pain. Negative for diarrhea, nausea and vomiting. Genitourinary: Positive for pelvic pain and vaginal bleeding. Negative for difficulty urinating, dysuria and hematuria. Positives and Pertinent negatives as per HPI. Except as noted above in the ROS, all other systems were reviewed and negative.        PAST MEDICAL HISTORY     Past Medical History:   Diagnosis Date    Chlamydia 07/12/2016    ESBL (extended spectrum beta-lactamase) producing bacteria infection 02/05/2021    urine    Neisseria gonorrheae 07/12/2016    Trichomonas infection     2013         SURGICAL HISTORY     Past Surgical History:   Procedure Laterality Date    DILATION AND CURETTAGE OF UTERUS N/A 2/10/2021    SUCTION DILATATION AND CURETTAGE performed by Ruth Ann Mallory MD at [address] [facility] N/A 5/17/2021    HYSTEROSCOPY DILATATION AND CURETTAGE, POLYPECTOMY/EXCISION OF MASS performed by Ruth Ann Mallory MD at [address] [place] [address]       Discharge Medication List as of 6/8/2021 11:55 PM      CONTINUE these medications which have NOT CHANGED    Details   !! ibuprofen (ADVIL;MOTRIN) 800 MG tablet Take 1 tablet by mouth every 6 hours as needed for Pain, Disp-120 tablet, R-3Print Phenazopyridine HCl (PYRIDIUM PO) Take by mouth 2 times daily as needed Over the counter doseHistorical Med       !! - Potential duplicate medications found. Please discuss with provider. ALLERGIES     Patient has no known allergies. FAMILYHISTORY     No family history on file. SOCIAL HISTORY       Social History     Tobacco Use    Smoking status: Current Every Day Smoker     Packs/day: 0.50     Types: Cigarettes    Smokeless tobacco: Never Used    Tobacco comment: quit one week ago   Vaping Use    Vaping Use: Never used   Substance Use Topics    Alcohol use: Not Currently    Drug use: No       SCREENINGS             PHYSICAL EXAM    (up to 7 for level 4, 8 or more for level 5)     ED Triage Vitals   BP Temp Temp src Pulse Resp SpO2 Height Weight   -- -- -- -- -- -- -- --       Physical Exam  Vitals and nursing note reviewed. Constitutional:       Appearance: She is well-developed. She is not diaphoretic. HENT:      Head: Normocephalic and atraumatic. Right Ear: External ear normal.      Left Ear: External ear normal.      Nose: Nose normal.   Eyes:      General:         Right eye: No discharge. Left eye: No discharge. Neck:      Trachea: No tracheal deviation. Cardiovascular:      Rate and Rhythm: Normal rate and regular rhythm. Heart sounds: No murmur heard. Pulmonary:      Effort: Pulmonary effort is normal. No respiratory distress. Breath sounds: Normal breath sounds. No wheezing or rales. Abdominal:      General: Bowel sounds are normal. There is no distension. Palpations: Abdomen is soft. Tenderness: There is abdominal tenderness. There is no guarding or rebound. Comments: Diffuse tenderness across the lower abdomen/pelvic region. No rebound or guarding. No peritoneal signs   Musculoskeletal:         General: Normal range of motion. Cervical back: Normal range of motion and neck supple.    Skin:     General: Skin is warm and dry.   Neurological:      Mental Status: She is alert and oriented to person, place, and time. Psychiatric:         Behavior: Behavior normal.         DIAGNOSTIC RESULTS   LABS:    Labs Reviewed   COMPREHENSIVE METABOLIC PANEL - Abnormal; Notable for the following components:       Result Value    Glucose 137 (*)     All other components within normal limits    Narrative:     Performed at:  [facility]  [address] [facility],  [place], [address]   Phone [phone]   CBC WITH AUTO DIFFERENTIAL    Narrative:     Performed at:  [facility] [address],  [place], [address]   Phone [phone]   HCG, SERUM, QUALITATIVE    Narrative:     Performed at:  [facility] [address],  [place], [address]   Phone [phone]       All other labs were within normal range or not returned as of this dictation. EKG: All EKG's are interpreted by the Emergency Department Physician in the absence of a cardiologist.  Please see their note for interpretation of EKG. RADIOLOGY:   Non-plain film images such as CT, Ultrasound and MRI are read by the radiologist. Plain radiographic images are visualized and preliminarily interpreted by the ED Provider with the below findings:        Interpretation per the Radiologist below, if available at the time of this note:    [address]   Final Result   There is a tiny amount of simple appearing fluid seen within the endometrial   cavity at the uterine fundus, otherwise no endometrial thickening or   hypervascularity to suggest retained products of conception. Unremarkable appearing ovaries. Arterial and venous flow was documented. No results found.         PROCEDURES   Unless otherwise noted below, none     Procedures    CRITICAL CARE TIME   N/A    CONSULTS:  None      EMERGENCY DEPARTMENT COURSE and DIFFERENTIAL DIAGNOSIS/MDM:   Vitals:    Vitals: 06/08/21 1945 06/08/21 2100   BP: 97/77 101/77   Pulse: 80    Resp: 14    Temp: 99 °F (37.2 °C)    TempSrc: Oral    SpO2: 100% 100%       Patient was given the following medications:  Medications   0.9 % sodium chloride bolus (0 mLs Intravenous Stopped 6/8/21 2142)   ondansetron (ZOFRAN) injection 4 mg (4 mg Intravenous Given 6/8/21 2004)           Briefly, this is a 24-year-old female who presents emergency department today for evaluation for abdominal pain. The patient states that she is followed by Dr. Rissa Linares, gynecology she states that she had a D&C performed in February of this year, and she states that she had to go back on 5/17/2021 due to concern for retained product/tissue. Patient states that she has had some vaginal bleeding since. She has been followed up in the office x2 b some minimal improvement in the bleeding. Over the past 2 days she said worsening pain, cramps and vaginal bleeding. Patient denies any dizzy or lightheaded. On examination she does have diffuse tenderness across her lower abdomen/pelvic area. No rebound or guarding. CBC shows no evidence of leukocytosis or anemia. CMP is unremarkable. Pregnancy is negative. Ultrasound is pending, this marks in my shift, please see attending note for details and final disposition    FINAL IMPRESSION      1.  Menorrhagia with irregular cycle          DISPOSITION/PLAN   DISPOSITION Decision To Discharge 06/08/2021 10:45:15 PM      PATIENT REFERRED TO:  Marco Antonio Bhat MD  [address]  [phone]    Schedule an appointment as soon as possible for a visit   for re-evaluation    [facility] Emergency Department  [address] [facility]  [phone]  Go to   If symptoms worsen      DISCHARGE MEDICATIONS:  Discharge Medication List as of 6/8/2021 11:55 PM      START taking these medications    Details   oxyCODONE-acetaminophen (PERCOCET) 5-325 MG per tablet Take 1 tablet by mouth every 6 hours as needed for Pain for up to 3 days. Intended supply: 3 days. Take lowest dose possible to manage pain, Disp-12 tablet, R-0Print      !! ibuprofen (IBU) 600 MG tablet Take 1 tablet by mouth every 6 hours as needed for Pain, Disp-120 tablet, R-0Print       !! - Potential duplicate medications found. Please discuss with provider.           DISCONTINUED MEDICATIONS:  Discharge Medication List as of 6/8/2021 11:55 PM                 (Please note that portions of this note were completed with a voice recognition program.  Efforts were made to edit the dictations but occasionally words are mis-transcribed.)    Yue Morin PA-C (electronically signed)            Yue Morin PA-C  06/09/21 [id number] Comprehensive

## 2021-09-01 ENCOUNTER — NON-APPOINTMENT (OUTPATIENT)
Age: 21
End: 2021-09-01

## 2021-12-08 NOTE — OB RN PATIENT PROFILE - EXTENSIONS OF SELF_ADULT
----- Message from Nighat Mendez MD sent at 12/8/2021  1:58 PM EST -----  INR good. Continue same dosage and recheck INR in 1 month. None

## 2022-05-02 NOTE — OB PROVIDER LABOR PROGRESS NOTE - NS_SUBJECTIVE/OBJECTIVE_OBGYN_ALL_OB_FT
Called patient to clarify dosing.  Request is for atorvastatin 20mg daily, however med list shows he takes atorvastatin 40mg daily.      Patient checked his med bottle and he is taking atorvastatin 40mg daily. He said he takes them nearly every day and occasionally takes 2 tablets. Advised him to only take one 40mg tablet per day.  Patient verbalizes understanding.     Patient is due for repeat FLP per cardiology - he agrees to go to lab in the next week for this.  Advised him to fast.    Refills remaining of Atorvastatin 40mg.  Sent prescription to [facility].  Called and cancelled refills at [facility] pharmacy.  
Patient comfortable with epidural in place.    Cervical monreal balloon placed successfully and inflated with 60cc of normal saline.
PT states she feels wet and thinks she might have ruptured membranes.    ICU Vital Signs Last 24 Hrs  T(C): 36.6 (20 Dec 2020 02:05), Max: 36.9 (19 Dec 2020 19:10)  T(F): 97.88 (20 Dec 2020 02:05), Max: 98.42 (19 Dec 2020 19:10)  HR: 63 (20 Dec 2020 06:19) (57 - 116)  BP: 146/85 (20 Dec 2020 05:50) (109/56 - 157/72)  RR: 20 (20 Dec 2020 02:05) (16 - 20)  SpO2: 100% (20 Dec 2020 06:14) (92% - 100%)
The pt is a 19 y/o  at 37w6d undergoing IOL due to gHTN, in the setting of degenerating fibroids.

## 2022-06-01 NOTE — OB RN TRIAGE NOTE - PSH
Discharge order was put in by Dr grag,verified,she said she was cleared by gi,didnt see any prescription she was told about the result of gastritis,fallow up gera alvarez was put in with pcp appt in 2 wks.iv was pulled .dicharge instruction was given,,discharge in stable condition  
No significant past surgical history

## 2022-06-04 NOTE — ED PROVIDER NOTE - MEDICAL DECISION MAKING DETAILS
Pt continues to be non interactive, opens eyes to loud voice. Pt vomited 100 ml green emisis this shift. Hourly and PRN rounds performed during this shift; all needs met at this time. Bed in low/locked position and call light, personal items within reach. The patient presents to ED with mild left knee pain but all resolved in ED. Lab and X-ray negative

## 2022-07-06 ENCOUNTER — NON-APPOINTMENT (OUTPATIENT)
Age: 22
End: 2022-07-06

## 2022-07-18 ENCOUNTER — APPOINTMENT (OUTPATIENT)
Dept: OBGYN | Facility: CLINIC | Age: 22
End: 2022-07-18

## 2022-07-18 ENCOUNTER — LABORATORY RESULT (OUTPATIENT)
Age: 22
End: 2022-07-18

## 2022-07-18 VITALS
WEIGHT: 154 LBS | HEIGHT: 64 IN | BODY MASS INDEX: 26.29 KG/M2 | DIASTOLIC BLOOD PRESSURE: 68 MMHG | SYSTOLIC BLOOD PRESSURE: 110 MMHG

## 2022-07-18 DIAGNOSIS — Z87.42 PERSONAL HISTORY OF OTHER DISEASES OF THE FEMALE GENITAL TRACT: ICD-10-CM

## 2022-07-18 DIAGNOSIS — Z87.59 PERSONAL HISTORY OF OTHER COMPLICATIONS OF PREGNANCY, CHILDBIRTH AND THE PUERPERIUM: ICD-10-CM

## 2022-07-18 PROCEDURE — 81025 URINE PREGNANCY TEST: CPT

## 2022-07-18 PROCEDURE — 99213 OFFICE O/P EST LOW 20 MIN: CPT | Mod: 25

## 2022-07-18 PROCEDURE — 99395 PREV VISIT EST AGE 18-39: CPT

## 2022-07-18 NOTE — HISTORY OF PRESENT ILLNESS
[N] : Patient does not use contraception [Y] : Patient is sexually active [Frequency: Q ___ days] : menstrual periods occur approximately every [unfilled] days [Menarche Age: ____] : age at menarche was [unfilled] [Regular Cycle Intervals] : periods have been regular [PGHxTotal] : 3 [PGHxPremature] : 0 [[facility] CampusxFulerm] : 1 [PGHxAbortions] : 1 [[facility]ur[facility] West Hospitaliving] : 1 [PGHxABInduced] : 1 [PGHxABSpont] : 0 [PGHxEctopic] : 0 [PGHxMultBirths] : 0

## 2022-07-19 LAB
C TRACH RRNA SPEC QL NAA+PROBE: NOT DETECTED
HCG SERPL-MCNC: ABNORMAL MIU/ML
N GONORRHOEA RRNA SPEC QL NAA+PROBE: NOT DETECTED
SOURCE TP AMPLIFICATION: NORMAL

## 2022-07-20 ENCOUNTER — APPOINTMENT (OUTPATIENT)
Dept: OBGYN | Facility: CLINIC | Age: 22
End: 2022-07-20

## 2022-07-20 RX ORDER — IBUPROFEN 600 MG/1
600 TABLET, FILM COATED ORAL EVERY 6 HOURS
Refills: 0 | Status: COMPLETED | COMMUNITY
Start: 2020-12-24 | End: 2022-07-20

## 2022-07-20 RX ORDER — CHLORHEXIDINE GLUCONATE 4 %
325 (65 FE) LIQUID (ML) TOPICAL 3 TIMES DAILY
Qty: 90 | Refills: 5 | Status: COMPLETED | COMMUNITY
Start: 2020-05-15 | End: 2022-07-20

## 2022-07-21 LAB — HCG SERPL-MCNC: ABNORMAL MIU/ML

## 2022-07-24 ENCOUNTER — LABORATORY RESULT (OUTPATIENT)
Age: 22
End: 2022-07-24

## 2022-07-25 ENCOUNTER — EMERGENCY (EMERGENCY)
Facility: HOSPITAL | Age: 22
LOS: 1 days | Discharge: HOME | End: 2022-07-25
Attending: EMERGENCY MEDICINE
Payer: MEDICAID

## 2022-07-25 VITALS
HEART RATE: 90 BPM | WEIGHT: 139.99 LBS | DIASTOLIC BLOOD PRESSURE: 58 MMHG | SYSTOLIC BLOOD PRESSURE: 104 MMHG | RESPIRATION RATE: 18 BRPM | HEIGHT: 64 IN | OXYGEN SATURATION: 100 % | TEMPERATURE: 99 F

## 2022-07-25 LAB
ALBUMIN SERPL ELPH-MCNC: 4.4 G/DL — SIGNIFICANT CHANGE UP (ref 3.3–5.2)
ALP SERPL-CCNC: 42 U/L — SIGNIFICANT CHANGE UP (ref 40–120)
ALT FLD-CCNC: 9 U/L — SIGNIFICANT CHANGE UP
ANION GAP SERPL CALC-SCNC: 12 MMOL/L — SIGNIFICANT CHANGE UP (ref 5–17)
APPEARANCE UR: CLEAR — SIGNIFICANT CHANGE UP
AST SERPL-CCNC: 17 U/L — SIGNIFICANT CHANGE UP
BACTERIA # UR AUTO: ABNORMAL
BASOPHILS # BLD AUTO: 0.05 K/UL — SIGNIFICANT CHANGE UP (ref 0–0.2)
BASOPHILS NFR BLD AUTO: 0.6 % — SIGNIFICANT CHANGE UP (ref 0–2)
BILIRUB SERPL-MCNC: 0.6 MG/DL — SIGNIFICANT CHANGE UP (ref 0.4–2)
BILIRUB UR-MCNC: NEGATIVE — SIGNIFICANT CHANGE UP
BUN SERPL-MCNC: 7.6 MG/DL — LOW (ref 8–20)
CALCIUM SERPL-MCNC: 9.4 MG/DL — SIGNIFICANT CHANGE UP (ref 8.6–10.2)
CHLORIDE SERPL-SCNC: 98 MMOL/L — SIGNIFICANT CHANGE UP (ref 98–107)
CO2 SERPL-SCNC: 20 MMOL/L — LOW (ref 22–29)
COLOR SPEC: YELLOW — SIGNIFICANT CHANGE UP
CREAT SERPL-MCNC: 0.38 MG/DL — LOW (ref 0.5–1.3)
DIFF PNL FLD: NEGATIVE — SIGNIFICANT CHANGE UP
EGFR: 143 ML/MIN/1.73M2 — SIGNIFICANT CHANGE UP
EOSINOPHIL # BLD AUTO: 0.04 K/UL — SIGNIFICANT CHANGE UP (ref 0–0.5)
EOSINOPHIL NFR BLD AUTO: 0.5 % — SIGNIFICANT CHANGE UP (ref 0–6)
EPI CELLS # UR: SIGNIFICANT CHANGE UP
GLUCOSE SERPL-MCNC: 92 MG/DL — SIGNIFICANT CHANGE UP (ref 70–99)
GLUCOSE UR QL: NEGATIVE MG/DL — SIGNIFICANT CHANGE UP
HCT VFR BLD CALC: 31.2 % — LOW (ref 34.5–45)
HGB BLD-MCNC: 9.2 G/DL — LOW (ref 11.5–15.5)
IMM GRANULOCYTES NFR BLD AUTO: 0.3 % — SIGNIFICANT CHANGE UP (ref 0–1.5)
KETONES UR-MCNC: NEGATIVE — SIGNIFICANT CHANGE UP
LEUKOCYTE ESTERASE UR-ACNC: ABNORMAL
LYMPHOCYTES # BLD AUTO: 1.81 K/UL — SIGNIFICANT CHANGE UP (ref 1–3.3)
LYMPHOCYTES # BLD AUTO: 20.7 % — SIGNIFICANT CHANGE UP (ref 13–44)
MCHC RBC-ENTMCNC: 20 PG — LOW (ref 27–34)
MCHC RBC-ENTMCNC: 29.5 GM/DL — LOW (ref 32–36)
MCV RBC AUTO: 67.8 FL — LOW (ref 80–100)
MONOCYTES # BLD AUTO: 0.64 K/UL — SIGNIFICANT CHANGE UP (ref 0–0.9)
MONOCYTES NFR BLD AUTO: 7.3 % — SIGNIFICANT CHANGE UP (ref 2–14)
NEUTROPHILS # BLD AUTO: 6.16 K/UL — SIGNIFICANT CHANGE UP (ref 1.8–7.4)
NEUTROPHILS NFR BLD AUTO: 70.6 % — SIGNIFICANT CHANGE UP (ref 43–77)
NITRITE UR-MCNC: NEGATIVE — SIGNIFICANT CHANGE UP
PH UR: 7 — SIGNIFICANT CHANGE UP (ref 5–8)
PLATELET # BLD AUTO: 606 K/UL — HIGH (ref 150–400)
POTASSIUM SERPL-MCNC: 3.8 MMOL/L — SIGNIFICANT CHANGE UP (ref 3.5–5.3)
POTASSIUM SERPL-SCNC: 3.8 MMOL/L — SIGNIFICANT CHANGE UP (ref 3.5–5.3)
PROT SERPL-MCNC: 8.2 G/DL — SIGNIFICANT CHANGE UP (ref 6.6–8.7)
PROT UR-MCNC: NEGATIVE — SIGNIFICANT CHANGE UP
RBC # BLD: 4.6 M/UL — SIGNIFICANT CHANGE UP (ref 3.8–5.2)
RBC # FLD: 19.4 % — HIGH (ref 10.3–14.5)
RBC CASTS # UR COMP ASSIST: SIGNIFICANT CHANGE UP /HPF (ref 0–4)
SODIUM SERPL-SCNC: 130 MMOL/L — LOW (ref 135–145)
SP GR SPEC: 1.01 — SIGNIFICANT CHANGE UP (ref 1.01–1.02)
UROBILINOGEN FLD QL: NEGATIVE MG/DL — SIGNIFICANT CHANGE UP
WBC # BLD: 8.73 K/UL — SIGNIFICANT CHANGE UP (ref 3.8–10.5)
WBC # FLD AUTO: 8.73 K/UL — SIGNIFICANT CHANGE UP (ref 3.8–10.5)
WBC UR QL: SIGNIFICANT CHANGE UP /HPF (ref 0–5)

## 2022-07-25 PROCEDURE — 99284 EMERGENCY DEPT VISIT MOD MDM: CPT | Mod: 25

## 2022-07-25 PROCEDURE — 85025 COMPLETE CBC W/AUTO DIFF WBC: CPT

## 2022-07-25 PROCEDURE — 80053 COMPREHEN METABOLIC PANEL: CPT

## 2022-07-25 PROCEDURE — 99284 EMERGENCY DEPT VISIT MOD MDM: CPT

## 2022-07-25 PROCEDURE — 87086 URINE CULTURE/COLONY COUNT: CPT

## 2022-07-25 PROCEDURE — 36415 COLL VENOUS BLD VENIPUNCTURE: CPT

## 2022-07-25 PROCEDURE — 96374 THER/PROPH/DIAG INJ IV PUSH: CPT

## 2022-07-25 PROCEDURE — 81001 URINALYSIS AUTO W/SCOPE: CPT

## 2022-07-25 PROCEDURE — 84702 CHORIONIC GONADOTROPIN TEST: CPT

## 2022-07-25 RX ORDER — SODIUM CHLORIDE 9 MG/ML
1000 INJECTION INTRAMUSCULAR; INTRAVENOUS; SUBCUTANEOUS ONCE
Refills: 0 | Status: COMPLETED | OUTPATIENT
Start: 2022-07-25 | End: 2022-07-25

## 2022-07-25 RX ORDER — METOCLOPRAMIDE HCL 10 MG
1 TABLET ORAL
Qty: 21 | Refills: 0
Start: 2022-07-25 | End: 2022-07-31

## 2022-07-25 RX ORDER — METOCLOPRAMIDE HCL 10 MG
10 TABLET ORAL ONCE
Refills: 0 | Status: COMPLETED | OUTPATIENT
Start: 2022-07-25 | End: 2022-07-25

## 2022-07-25 RX ADMIN — Medication 10 MILLIGRAM(S): at 13:55

## 2022-07-25 RX ADMIN — SODIUM CHLORIDE 1000 MILLILITER(S): 9 INJECTION INTRAMUSCULAR; INTRAVENOUS; SUBCUTANEOUS at 13:55

## 2022-07-25 NOTE — ED PROVIDER NOTE - CLINICAL SUMMARY MEDICAL DECISION MAKING FREE TEXT BOX
ASSESSMENT:   DIMITRIOS WELCH is a 23yo F who presented with VOMITING a/w pregnancy. Describes 3 weeks severe symptoms. EKG w/o acute changes. Physical exam w/o significant findings.    Concerning for hyperemesis gravidarum.     PLAN: fluids, symptomatic control, check for electrolyte disturbances. ASSESSMENT:   DIMITRIOS WELCH is a 21yo F who presented with VOMITING a/w pregnancy. Describes 3 weeks severe symptoms. No bleeding. Physical exam w/o significant findings.    Concerning for hyperemesis gravidarum.     PLAN: fluids, symptomatic control, check for electrolyte disturbances.

## 2022-07-25 NOTE — ED ADULT NURSE NOTE - NSIMPLEMENTINTERV_GEN_ALL_ED
Implemented All Universal Safety Interventions:  [place] to call system. Call bell, personal items and telephone within reach. Instruct patient to call for assistance. Room bathroom lighting operational. Non-slip footwear when patient is off stretcher. Physically safe environment: no spills, clutter or unnecessary equipment. Stretcher in lowest position, wheels locked, appropriate side rails in place.

## 2022-07-25 NOTE — ED PROVIDER NOTE - OBJECTIVE STATEMENT
KODY WELCH is a 21yo  6wk pregnant F who presents c/o VOMITING x three weeks. Describes severe nausea throughout the day w/ several episodes of emesis daily. Zofran causing stomach pain and dc'd by patient. States unable to keep any food down. Called her OB who told her to come in for fluids. She denies any other associated symptoms specifically denying fevers/chills, cp/sob, abdominal pain, blood in stool, urinary symptoms, numbness, tingling, weakness. KODY WELCH is a 23yo  6wk pregnant F who presents c/o VOMITING x three weeks. Describes severe nausea throughout the day w/ several episodes of emesis daily. Zofran causing stomach pain and dc'd by patient. States unable to keep any food down. Called her OB who told her to come in for fluids. Denies vaginal bleeding. She denies any other associated symptoms specifically denying fevers/chills, cp/sob, abdominal pain, blood in stool, urinary symptoms, numbness, tingling, weakness.

## 2022-07-25 NOTE — ED PROVIDER NOTE - NS ED ROS FT
Review of Systems  CONSTITUTIONAL: afebrile w/no diaphoresis or weight changes  SKIN: warm, dry w/ no rash or bleeding  EYES: no changes to vision  ENT: no changes in hearing, no sore throat  RESPIRATORY: no cough or SOB  CARDIAC: no chest pain & no palpitations  GI:d no diarrhea, constipation, or blood in stool/anya blood +N/V  GENITO-URINARY: no discharge, dysuria or hematuria,   MUSCULOSKELETAL:  no joint pain, swelling or redness  NEUROLOGIC: no weakness, headache, anesthesia or paresthesias  PSYCH: no anxiety, non suicidal, non homicidal, without hallucinations or depression

## 2022-07-25 NOTE — ED PROVIDER NOTE - NSFOLLOWUPINSTRUCTIONS_ED_ALL_ED_FT
-Follow up with OB for further evaluation and management of your nausea/vomiting.   -Medications have been sent to your pharmacy. Pick them up and take them as directed.    Nausea / Vomiting    Nausea is the feeling that you have to vomit. As nausea gets worse, it can lead to vomiting. Vomiting puts you at an increased risk for dehydration. Older adults and people with other diseases or a weak immune system are at higher risk for dehydration. Drink clear fluids in small but frequent amounts as tolerated. Eat bland, easy-to-digest foods in small amounts as tolerated.    SEEK IMMEDIATE MEDICAL CARE IF YOU HAVE ANY OF THE FOLLOWING SYMPTOMS: fever, inability to keep sufficient fluids down, black or bloody vomitus, black or bloody stools, lightheadedness/dizziness, chest pain, severe headache, rash, shortness of breath, cold or clammy skin, confusion, pain with urination, or any signs of dehydration.

## 2022-07-25 NOTE — ED ADULT NURSE NOTE - OBJECTIVE STATEMENT
pt a&ox3, vss, c/o n/v x1 day. pt is aprox. 6 weeks pregnant, denies any urinary symptoms or fevers. respirations even and unlabored. POC discussed w/ patient. meds gvn per rx. POC discussed w/ patient, will continue to  monitor.

## 2022-07-25 NOTE — ED ADULT TRIAGE NOTE - CHIEF COMPLAINT QUOTE
C/o nausea and vomiting. Pt states, "I am six weeks pregnant and was told to come to ED by my OBGYN for fluids". Pt states she was taking Zofran but it was causing abdominal discomfort. Hx of anemia and asthma

## 2022-07-25 NOTE — ED PROVIDER NOTE - PATIENT PORTAL LINK FT
You can access the FollowMyHealth Patient Portal offered by [facility] by registering at the following website: [email or web address]. By joining [email or web address]’s FollowMyHealth portal, you will also be able to view your health information using other applications (apps) compatible with our system.

## 2022-07-25 NOTE — ED PROVIDER NOTE - ATTENDING CONTRIBUTION TO CARE
Miguel A: I performed a face to face evaluation of this patient and performed a full history and physical examination on the patient.  I agree with the resident's history, physical examination, and plan of the patient unless otherwise noted. My brief assessment is as follows:  (1 ) LMP 6/10 had tvus that saw sack last week c/o several days of vomiting. was taking zofran for several days but felt like it was causing her discomfort so stopped it. no abd pain no vag bleeding, no urinary symptoms. non toxic, mmm, ctab, rrr, abd soft, no rebound/guarding. neuro intact. will check basic labs, treat symptoms. reassess.

## 2022-07-27 LAB
CULTURE RESULTS: SIGNIFICANT CHANGE UP
CYTOLOGY CVX/VAG DOC THIN PREP: ABNORMAL
SPECIMEN SOURCE: SIGNIFICANT CHANGE UP

## 2022-07-27 RX ORDER — DOXYLAMINE SUCCINATE AND PYRIDOXINE HYDROCHLORIDE 20; 20 MG/1; MG/1
20-20 TABLET, EXTENDED RELEASE ORAL
Qty: 90 | Refills: 2 | Status: DISCONTINUED | COMMUNITY
Start: 2022-07-25 | End: 2022-07-27

## 2022-08-02 ENCOUNTER — APPOINTMENT (OUTPATIENT)
Dept: ANTEPARTUM | Facility: CLINIC | Age: 22
End: 2022-08-02

## 2022-08-02 ENCOUNTER — ASOB RESULT (OUTPATIENT)
Age: 22
End: 2022-08-02

## 2022-08-02 PROCEDURE — 76817 TRANSVAGINAL US OBSTETRIC: CPT

## 2022-08-09 ENCOUNTER — APPOINTMENT (OUTPATIENT)
Dept: OBGYN | Facility: CLINIC | Age: 22
End: 2022-08-09

## 2022-08-09 VITALS
SYSTOLIC BLOOD PRESSURE: 116 MMHG | DIASTOLIC BLOOD PRESSURE: 70 MMHG | BODY MASS INDEX: 26.18 KG/M2 | HEIGHT: 64 IN | WEIGHT: 153.38 LBS

## 2022-08-09 PROCEDURE — 0501F PRENATAL FLOW SHEET: CPT

## 2022-08-09 PROCEDURE — 36415 COLL VENOUS BLD VENIPUNCTURE: CPT

## 2022-08-10 LAB
ABO + RH PNL BLD: NORMAL
ALBUMIN SERPL ELPH-MCNC: 4.5 G/DL
ALP BLD-CCNC: 38 U/L
ALT SERPL-CCNC: 9 U/L
ANION GAP SERPL CALC-SCNC: 17 MMOL/L
APPEARANCE: CLEAR
AST SERPL-CCNC: 19 U/L
BASOPHILS # BLD AUTO: 0.06 K/UL
BASOPHILS NFR BLD AUTO: 0.7 %
BILIRUB SERPL-MCNC: 0.2 MG/DL
BILIRUBIN URINE: NEGATIVE
BLD GP AB SCN SERPL QL: NORMAL
BLOOD URINE: NEGATIVE
BUN SERPL-MCNC: 9 MG/DL
CALCIUM SERPL-MCNC: 9.8 MG/DL
CHLORIDE SERPL-SCNC: 98 MMOL/L
CMV IGG SERPL QL: <0.2 U/ML
CMV IGG SERPL-IMP: NEGATIVE
CMV IGM SERPL QL: <8 AU/ML
CMV IGM SERPL QL: NEGATIVE
CO2 SERPL-SCNC: 19 MMOL/L
COLOR: YELLOW
CREAT SERPL-MCNC: 0.52 MG/DL
EGFR: 133 ML/MIN/1.73M2
EOSINOPHIL # BLD AUTO: 0.06 K/UL
EOSINOPHIL NFR BLD AUTO: 0.7 %
ESTIMATED AVERAGE GLUCOSE: 105 MG/DL
GLUCOSE QUALITATIVE U: NEGATIVE
GLUCOSE SERPL-MCNC: 59 MG/DL
HBA1C MFR BLD HPLC: 5.3 %
HBV SURFACE AG SER QL: NONREACTIVE
HCT VFR BLD CALC: 30.7 %
HCV AB SER QL: NONREACTIVE
HCV S/CO RATIO: 0.11 S/CO
HGB BLD-MCNC: 8.7 G/DL
HIV1+2 AB SPEC QL IA.RAPID: NONREACTIVE
IMM GRANULOCYTES NFR BLD AUTO: 0.4 %
KETONES URINE: NEGATIVE
LEUKOCYTE ESTERASE URINE: NEGATIVE
LYMPHOCYTES # BLD AUTO: 2.24 K/UL
LYMPHOCYTES NFR BLD AUTO: 27 %
MAN DIFF?: NORMAL
MCHC RBC-ENTMCNC: 19.7 PG
MCHC RBC-ENTMCNC: 28.3 GM/DL
MCV RBC AUTO: 69.5 FL
MEV IGG FLD QL IA: <5 AU/ML
MEV IGG+IGM SER-IMP: NEGATIVE
MONOCYTES # BLD AUTO: 0.69 K/UL
MONOCYTES NFR BLD AUTO: 8.3 %
MUV AB SER-ACNC: NEGATIVE
MUV IGG SER QL IA: <5 AU/ML
NEUTROPHILS # BLD AUTO: 5.23 K/UL
NEUTROPHILS NFR BLD AUTO: 62.9 %
NITRITE URINE: NEGATIVE
PH URINE: 6.5
PLATELET # BLD AUTO: 558 K/UL
POTASSIUM SERPL-SCNC: 4.4 MMOL/L
PROT SERPL-MCNC: 7.9 G/DL
PROTEIN URINE: ABNORMAL
RBC # BLD: 4.42 M/UL
RBC # FLD: 20.2 %
RUBV IGG FLD-ACNC: 3 INDEX
RUBV IGG SER-IMP: POSITIVE
SODIUM SERPL-SCNC: 135 MMOL/L
SPECIFIC GRAVITY URINE: 1.03
T GONDII AB SER-IMP: NEGATIVE
T GONDII AB SER-IMP: NEGATIVE
T GONDII IGG SER QL: <3 IU/ML
T GONDII IGM SER QL: <3 AU/ML
TSH SERPL-ACNC: 0.42 UIU/ML
UROBILINOGEN URINE: ABNORMAL
VZV AB TITR SER: POSITIVE
VZV IGG SER IF-ACNC: 1624 INDEX
WBC # FLD AUTO: 8.31 K/UL

## 2022-08-10 RX ORDER — FERROUS SULFATE TAB EC 324 MG (65 MG FE EQUIVALENT) 324 (65 FE) MG
324 (65 FE) TABLET DELAYED RESPONSE ORAL
Qty: 30 | Refills: 5 | Status: ACTIVE | COMMUNITY
Start: 2022-08-10 | End: 1900-01-01

## 2022-08-11 ENCOUNTER — TRANSCRIPTION ENCOUNTER (OUTPATIENT)
Age: 22
End: 2022-08-11

## 2022-08-11 LAB
HGB A MFR BLD: 98 %
HGB A2 MFR BLD: 2 %
HGB FRACT BLD-IMP: NORMAL
LEAD BLD-MCNC: <1 UG/DL

## 2022-08-11 RX ORDER — NIFEDIPINE 30 MG/1
30 TABLET, EXTENDED RELEASE ORAL DAILY
Refills: 0 | Status: COMPLETED | COMMUNITY
Start: 2020-12-24 | End: 2022-08-11

## 2022-08-11 RX ORDER — PNV 24/IRON AA CHEL/FOLIC ACID 30 MG-975
TABLET ORAL
Refills: 0 | Status: COMPLETED | COMMUNITY
End: 2022-08-11

## 2022-08-11 RX ORDER — MULTIVIT-MIN/IRON/FOLIC ACID/K 18-600-40
500 CAPSULE ORAL
Refills: 0 | Status: COMPLETED | COMMUNITY
Start: 2020-12-24 | End: 2022-08-11

## 2022-08-12 LAB
B19V IGG SER QL IA: 9.22 INDEX
B19V IGG+IGM SER-IMP: NORMAL
B19V IGG+IGM SER-IMP: POSITIVE
B19V IGM FLD-ACNC: 0.12 INDEX
B19V IGM SER-ACNC: NEGATIVE
M TB IFN-G BLD-IMP: ABNORMAL
QUANTIFERON TB PLUS MITOGEN MINUS NIL: 0.24 IU/ML
QUANTIFERON TB PLUS NIL: 0.02 IU/ML
QUANTIFERON TB PLUS TB1 MINUS NIL: 0 IU/ML
QUANTIFERON TB PLUS TB2 MINUS NIL: 0 IU/ML
T PALLIDUM AB SER QL IA: NEGATIVE

## 2022-08-16 ENCOUNTER — APPOINTMENT (OUTPATIENT)
Dept: OBGYN | Facility: CLINIC | Age: 22
End: 2022-08-16

## 2022-08-16 VITALS
BODY MASS INDEX: 26.12 KG/M2 | DIASTOLIC BLOOD PRESSURE: 70 MMHG | SYSTOLIC BLOOD PRESSURE: 118 MMHG | WEIGHT: 153 LBS | HEIGHT: 64 IN

## 2022-08-16 PROCEDURE — 0502F SUBSEQUENT PRENATAL CARE: CPT

## 2022-08-18 ENCOUNTER — ASOB RESULT (OUTPATIENT)
Age: 22
End: 2022-08-18

## 2022-08-18 ENCOUNTER — APPOINTMENT (OUTPATIENT)
Dept: MATERNAL FETAL MEDICINE | Facility: CLINIC | Age: 22
End: 2022-08-18

## 2022-08-18 PROCEDURE — 99202 OFFICE O/P NEW SF 15 MIN: CPT | Mod: 95

## 2022-08-19 ENCOUNTER — APPOINTMENT (OUTPATIENT)
Dept: ANTEPARTUM | Facility: CLINIC | Age: 22
End: 2022-08-19

## 2022-08-19 PROCEDURE — 36415 COLL VENOUS BLD VENIPUNCTURE: CPT

## 2022-08-30 ENCOUNTER — NON-APPOINTMENT (OUTPATIENT)
Age: 22
End: 2022-08-30

## 2022-09-01 ENCOUNTER — NON-APPOINTMENT (OUTPATIENT)
Age: 22
End: 2022-09-01

## 2022-09-01 ENCOUNTER — EMERGENCY (EMERGENCY)
Facility: HOSPITAL | Age: 22
LOS: 1 days | Discharge: HOME | End: 2022-09-01
Attending: EMERGENCY MEDICINE
Payer: MEDICAID

## 2022-09-01 VITALS
HEART RATE: 84 BPM | HEIGHT: 64 IN | TEMPERATURE: 98 F | OXYGEN SATURATION: 98 % | WEIGHT: 154.98 LBS | SYSTOLIC BLOOD PRESSURE: 106 MMHG | RESPIRATION RATE: 20 BRPM | DIASTOLIC BLOOD PRESSURE: 62 MMHG

## 2022-09-01 LAB
ACANTHOCYTES BLD QL SMEAR: SLIGHT — SIGNIFICANT CHANGE UP
ACETONE SERPL-MCNC: NEGATIVE — SIGNIFICANT CHANGE UP
ALBUMIN SERPL ELPH-MCNC: 3.9 G/DL — SIGNIFICANT CHANGE UP (ref 3.3–5.2)
ALP SERPL-CCNC: 34 U/L — LOW (ref 40–120)
ALT FLD-CCNC: 10 U/L — SIGNIFICANT CHANGE UP
ANION GAP SERPL CALC-SCNC: 14 MMOL/L — SIGNIFICANT CHANGE UP (ref 5–17)
ANISOCYTOSIS BLD QL: SIGNIFICANT CHANGE UP
APPEARANCE UR: CLEAR — SIGNIFICANT CHANGE UP
AST SERPL-CCNC: 29 U/L — SIGNIFICANT CHANGE UP
BASOPHILS # BLD AUTO: 0 K/UL — SIGNIFICANT CHANGE UP (ref 0–0.2)
BASOPHILS NFR BLD AUTO: 0 % — SIGNIFICANT CHANGE UP (ref 0–2)
BILIRUB SERPL-MCNC: 0.3 MG/DL — LOW (ref 0.4–2)
BILIRUB UR-MCNC: NEGATIVE — SIGNIFICANT CHANGE UP
BUN SERPL-MCNC: 6 MG/DL — LOW (ref 8–20)
CALCIUM SERPL-MCNC: 9.4 MG/DL — SIGNIFICANT CHANGE UP (ref 8.4–10.5)
CHLORIDE SERPL-SCNC: 98 MMOL/L — SIGNIFICANT CHANGE UP (ref 98–107)
CO2 SERPL-SCNC: 18 MMOL/L — LOW (ref 22–29)
COLOR SPEC: YELLOW — SIGNIFICANT CHANGE UP
CREAT SERPL-MCNC: 0.35 MG/DL — LOW (ref 0.5–1.3)
DIFF PNL FLD: NEGATIVE — SIGNIFICANT CHANGE UP
EGFR: 146 ML/MIN/1.73M2 — SIGNIFICANT CHANGE UP
EOSINOPHIL # BLD AUTO: 0 K/UL — SIGNIFICANT CHANGE UP (ref 0–0.5)
EOSINOPHIL NFR BLD AUTO: 0 % — SIGNIFICANT CHANGE UP (ref 0–6)
GIANT PLATELETS BLD QL SMEAR: PRESENT — SIGNIFICANT CHANGE UP
GLUCOSE SERPL-MCNC: 81 MG/DL — SIGNIFICANT CHANGE UP (ref 70–99)
GLUCOSE UR QL: NEGATIVE MG/DL — SIGNIFICANT CHANGE UP
HCT VFR BLD CALC: 29.6 % — LOW (ref 34.5–45)
HGB BLD-MCNC: 8.6 G/DL — LOW (ref 11.5–15.5)
HYPOCHROMIA BLD QL: SIGNIFICANT CHANGE UP
KETONES UR-MCNC: ABNORMAL
LEUKOCYTE ESTERASE UR-ACNC: NEGATIVE — SIGNIFICANT CHANGE UP
LYMPHOCYTES # BLD AUTO: 1.87 K/UL — SIGNIFICANT CHANGE UP (ref 1–3.3)
LYMPHOCYTES # BLD AUTO: 19.3 % — SIGNIFICANT CHANGE UP (ref 13–44)
MAGNESIUM SERPL-MCNC: 1.8 MG/DL — SIGNIFICANT CHANGE UP (ref 1.6–2.6)
MANUAL SMEAR VERIFICATION: SIGNIFICANT CHANGE UP
MCHC RBC-ENTMCNC: 19.5 PG — LOW (ref 27–34)
MCHC RBC-ENTMCNC: 29.1 GM/DL — LOW (ref 32–36)
MCV RBC AUTO: 67.3 FL — LOW (ref 80–100)
MICROCYTES BLD QL: SIGNIFICANT CHANGE UP
MONOCYTES # BLD AUTO: 0.51 K/UL — SIGNIFICANT CHANGE UP (ref 0–0.9)
MONOCYTES NFR BLD AUTO: 5.3 % — SIGNIFICANT CHANGE UP (ref 2–14)
NEUTROPHILS # BLD AUTO: 7.31 K/UL — SIGNIFICANT CHANGE UP (ref 1.8–7.4)
NEUTROPHILS NFR BLD AUTO: 75.4 % — SIGNIFICANT CHANGE UP (ref 43–77)
NITRITE UR-MCNC: NEGATIVE — SIGNIFICANT CHANGE UP
OVALOCYTES BLD QL SMEAR: SLIGHT — SIGNIFICANT CHANGE UP
PH UR: 6 — SIGNIFICANT CHANGE UP (ref 5–8)
PLAT MORPH BLD: NORMAL — SIGNIFICANT CHANGE UP
PLATELET # BLD AUTO: 527 K/UL — HIGH (ref 150–400)
POIKILOCYTOSIS BLD QL AUTO: SLIGHT — SIGNIFICANT CHANGE UP
POLYCHROMASIA BLD QL SMEAR: SIGNIFICANT CHANGE UP
POTASSIUM SERPL-MCNC: 3.9 MMOL/L — SIGNIFICANT CHANGE UP (ref 3.5–5.3)
POTASSIUM SERPL-SCNC: 3.9 MMOL/L — SIGNIFICANT CHANGE UP (ref 3.5–5.3)
PROT SERPL-MCNC: 8.1 G/DL — SIGNIFICANT CHANGE UP (ref 6.6–8.7)
PROT UR-MCNC: NEGATIVE — SIGNIFICANT CHANGE UP
RBC # BLD: 4.4 M/UL — SIGNIFICANT CHANGE UP (ref 3.8–5.2)
RBC # FLD: 18.6 % — HIGH (ref 10.3–14.5)
RBC BLD AUTO: ABNORMAL
SODIUM SERPL-SCNC: 130 MMOL/L — LOW (ref 135–145)
SP GR SPEC: 1.02 — SIGNIFICANT CHANGE UP (ref 1.01–1.02)
UROBILINOGEN FLD QL: NEGATIVE MG/DL — SIGNIFICANT CHANGE UP
WBC # BLD: 9.7 K/UL — SIGNIFICANT CHANGE UP (ref 3.8–10.5)
WBC # FLD AUTO: 9.7 K/UL — SIGNIFICANT CHANGE UP (ref 3.8–10.5)

## 2022-09-01 PROCEDURE — 96374 THER/PROPH/DIAG INJ IV PUSH: CPT

## 2022-09-01 PROCEDURE — 82009 KETONE BODYS QUAL: CPT

## 2022-09-01 PROCEDURE — 99284 EMERGENCY DEPT VISIT MOD MDM: CPT | Mod: 25

## 2022-09-01 PROCEDURE — 85025 COMPLETE CBC W/AUTO DIFF WBC: CPT

## 2022-09-01 PROCEDURE — 81003 URINALYSIS AUTO W/O SCOPE: CPT

## 2022-09-01 PROCEDURE — 99284 EMERGENCY DEPT VISIT MOD MDM: CPT

## 2022-09-01 PROCEDURE — 80053 COMPREHEN METABOLIC PANEL: CPT

## 2022-09-01 PROCEDURE — 36415 COLL VENOUS BLD VENIPUNCTURE: CPT

## 2022-09-01 PROCEDURE — 96361 HYDRATE IV INFUSION ADD-ON: CPT

## 2022-09-01 PROCEDURE — 83735 ASSAY OF MAGNESIUM: CPT

## 2022-09-01 RX ORDER — SODIUM CHLORIDE 9 MG/ML
2000 INJECTION INTRAMUSCULAR; INTRAVENOUS; SUBCUTANEOUS ONCE
Refills: 0 | Status: COMPLETED | OUTPATIENT
Start: 2022-09-01 | End: 2022-09-01

## 2022-09-01 RX ORDER — METOCLOPRAMIDE HCL 10 MG
10 TABLET ORAL ONCE
Refills: 0 | Status: COMPLETED | OUTPATIENT
Start: 2022-09-01 | End: 2022-09-01

## 2022-09-01 RX ADMIN — Medication 10 MILLIGRAM(S): at 18:32

## 2022-09-01 RX ADMIN — SODIUM CHLORIDE 2000 MILLILITER(S): 9 INJECTION INTRAMUSCULAR; INTRAVENOUS; SUBCUTANEOUS at 18:31

## 2022-09-01 NOTE — ED STATDOCS - NS ED ATTENDING STATEMENT MOD
This was a shared visit with the SUKH. I reviewed and verified the documentation and independently performed the documented:

## 2022-09-01 NOTE — ED PEDIATRIC NURSE NOTE - OBJECTIVE STATEMENT
pt presents to ED from home with constant vomiting. patient 12 weeks gestation.  unable to tolerate PO intake.  NAD

## 2022-09-01 NOTE — ED STATDOCS - OBJECTIVE STATEMENT
21 y/o female with no PMHX presents to EDc/o vomiting. Patient G P A @ 12 weeks  reports N/V, and unable to tolerate P.O.

## 2022-09-01 NOTE — ED STATDOCS - PATIENT PORTAL LINK FT
You can access the FollowMyHealth Patient Portal offered by [facility] by registering at the following website: [email or web address]. By joining [facility] [employer]’s FollowMyHealth portal, you will also be able to view your health information using other applications (apps) compatible with our system.

## 2022-09-01 NOTE — ED ADULT TRIAGE NOTE - CHIEF COMPLAINT QUOTE
Pt arrives to ED c/o nausea and vomiting for two days - unable to keep anything down - 3 months pregnant. Denies any cramping or bleeding

## 2022-09-01 NOTE — ED STATDOCS - ATTENDING APP SHARED VISIT CONTRIBUTION OF CARE
I, Rhona Abernathy, performed the initial face to face bedside interview with this patient regarding history of present illness, review of symptoms and relevant past medical, social and family history.  I completed an independent physical examination.  I was the initial provider who evaluated this patient. I have signed out the follow up of any pending tests (i.e. labs, radiological studies) to the ACP.  I have communicated the patient’s plan of care and disposition with the ACP.  The history, relevant review of systems, past medical and surgical history, medical decision making, and physical examination was documented by the scribe in my presence and I attest to the accuracy of the documentation.

## 2022-09-01 NOTE — ED PEDIATRIC NURSE NOTE - BRAND OF COVID-19 VACCINATION
Pfizer dose 1 and 2 My signature below certifies that the above stated patient is homebound and upon completion of the Face-To-Face encounter, has the need for intermittent skilled nursing, physical therapy and/or speech or occupational therapy services in their home for their current diagnosis as outlined in their initial plan of care. These services will continue to be monitored by myself or another physician.

## 2022-09-02 ENCOUNTER — ASOB RESULT (OUTPATIENT)
Age: 22
End: 2022-09-02

## 2022-09-02 ENCOUNTER — APPOINTMENT (OUTPATIENT)
Dept: ANTEPARTUM | Facility: CLINIC | Age: 22
End: 2022-09-02

## 2022-09-02 PROCEDURE — 76813 OB US NUCHAL MEAS 1 GEST: CPT

## 2022-09-02 PROCEDURE — 36415 COLL VENOUS BLD VENIPUNCTURE: CPT

## 2022-09-08 ENCOUNTER — APPOINTMENT (OUTPATIENT)
Dept: OBGYN | Facility: CLINIC | Age: 22
End: 2022-09-08

## 2022-09-08 VITALS
DIASTOLIC BLOOD PRESSURE: 72 MMHG | WEIGHT: 156.7 LBS | BODY MASS INDEX: 26.75 KG/M2 | HEIGHT: 64 IN | SYSTOLIC BLOOD PRESSURE: 110 MMHG

## 2022-09-08 LAB — AFP PNL SERPL: NORMAL

## 2022-09-08 PROCEDURE — 0502F SUBSEQUENT PRENATAL CARE: CPT

## 2022-09-11 LAB
M TB IFN-G BLD-IMP: NEGATIVE
QUANTIFERON TB PLUS MITOGEN MINUS NIL: 0.83 IU/ML
QUANTIFERON TB PLUS NIL: 0.01 IU/ML
QUANTIFERON TB PLUS TB1 MINUS NIL: 0 IU/ML
QUANTIFERON TB PLUS TB2 MINUS NIL: 0.01 IU/ML

## 2022-10-01 ENCOUNTER — EMERGENCY (EMERGENCY)
Facility: HOSPITAL | Age: 22
LOS: 1 days | Discharge: HOME | End: 2022-10-01
Attending: STUDENT IN AN ORGANIZED HEALTH CARE EDUCATION/TRAINING PROGRAM
Payer: MEDICAID

## 2022-10-01 ENCOUNTER — RX RENEWAL (OUTPATIENT)
Age: 22
End: 2022-10-01

## 2022-10-01 VITALS
RESPIRATION RATE: 17 BRPM | TEMPERATURE: 98 F | SYSTOLIC BLOOD PRESSURE: 106 MMHG | OXYGEN SATURATION: 100 % | HEART RATE: 59 BPM | DIASTOLIC BLOOD PRESSURE: 67 MMHG

## 2022-10-01 VITALS
RESPIRATION RATE: 20 BRPM | WEIGHT: 156.09 LBS | SYSTOLIC BLOOD PRESSURE: 115 MMHG | OXYGEN SATURATION: 99 % | DIASTOLIC BLOOD PRESSURE: 52 MMHG | HEIGHT: 64 IN | TEMPERATURE: 99 F | HEART RATE: 103 BPM

## 2022-10-01 LAB
ALBUMIN SERPL ELPH-MCNC: 3.7 G/DL — SIGNIFICANT CHANGE UP (ref 3.3–5.2)
ALP SERPL-CCNC: 37 U/L — LOW (ref 40–120)
ALT FLD-CCNC: 9 U/L — SIGNIFICANT CHANGE UP
ANION GAP SERPL CALC-SCNC: 11 MMOL/L — SIGNIFICANT CHANGE UP (ref 5–17)
ANISOCYTOSIS BLD QL: SLIGHT — SIGNIFICANT CHANGE UP
APPEARANCE UR: CLEAR — SIGNIFICANT CHANGE UP
AST SERPL-CCNC: 20 U/L — SIGNIFICANT CHANGE UP
BACTERIA # UR AUTO: ABNORMAL
BASOPHILS # BLD AUTO: 0 K/UL — SIGNIFICANT CHANGE UP (ref 0–0.2)
BASOPHILS NFR BLD AUTO: 0 % — SIGNIFICANT CHANGE UP (ref 0–2)
BILIRUB SERPL-MCNC: 0.3 MG/DL — LOW (ref 0.4–2)
BILIRUB UR-MCNC: NEGATIVE — SIGNIFICANT CHANGE UP
BUN SERPL-MCNC: 6.9 MG/DL — LOW (ref 8–20)
BURR CELLS BLD QL SMEAR: PRESENT — SIGNIFICANT CHANGE UP
CALCIUM SERPL-MCNC: 9.1 MG/DL — SIGNIFICANT CHANGE UP (ref 8.4–10.5)
CHLORIDE SERPL-SCNC: 102 MMOL/L — SIGNIFICANT CHANGE UP (ref 98–107)
CO2 SERPL-SCNC: 21 MMOL/L — LOW (ref 22–29)
COLOR SPEC: YELLOW — SIGNIFICANT CHANGE UP
COMMENT - URINE: SIGNIFICANT CHANGE UP
CREAT SERPL-MCNC: 0.34 MG/DL — LOW (ref 0.5–1.3)
DACRYOCYTES BLD QL SMEAR: SLIGHT — SIGNIFICANT CHANGE UP
DIFF PNL FLD: NEGATIVE — SIGNIFICANT CHANGE UP
EGFR: 147 ML/MIN/1.73M2 — SIGNIFICANT CHANGE UP
ELLIPTOCYTES BLD QL SMEAR: SLIGHT — SIGNIFICANT CHANGE UP
EOSINOPHIL # BLD AUTO: 0.27 K/UL — SIGNIFICANT CHANGE UP (ref 0–0.5)
EOSINOPHIL NFR BLD AUTO: 3.6 % — SIGNIFICANT CHANGE UP (ref 0–6)
EPI CELLS # UR: SIGNIFICANT CHANGE UP
GIANT PLATELETS BLD QL SMEAR: PRESENT — SIGNIFICANT CHANGE UP
GLUCOSE SERPL-MCNC: 80 MG/DL — SIGNIFICANT CHANGE UP (ref 70–99)
GLUCOSE UR QL: NEGATIVE MG/DL — SIGNIFICANT CHANGE UP
HCG SERPL-ACNC: HIGH MIU/ML
HCT VFR BLD CALC: 25.3 % — LOW (ref 34.5–45)
HGB BLD-MCNC: 7.4 G/DL — LOW (ref 11.5–15.5)
HYPOCHROMIA BLD QL: SLIGHT — SIGNIFICANT CHANGE UP
KETONES UR-MCNC: NEGATIVE — SIGNIFICANT CHANGE UP
LEUKOCYTE ESTERASE UR-ACNC: NEGATIVE — SIGNIFICANT CHANGE UP
LIDOCAIN IGE QN: 27 U/L — SIGNIFICANT CHANGE UP (ref 22–51)
LYMPHOCYTES # BLD AUTO: 2.1 K/UL — SIGNIFICANT CHANGE UP (ref 1–3.3)
LYMPHOCYTES # BLD AUTO: 27.7 % — SIGNIFICANT CHANGE UP (ref 13–44)
MANUAL SMEAR VERIFICATION: SIGNIFICANT CHANGE UP
MCHC RBC-ENTMCNC: 19.2 PG — LOW (ref 27–34)
MCHC RBC-ENTMCNC: 29.2 GM/DL — LOW (ref 32–36)
MCV RBC AUTO: 65.5 FL — LOW (ref 80–100)
MICROCYTES BLD QL: SLIGHT — SIGNIFICANT CHANGE UP
MONOCYTES # BLD AUTO: 0.61 K/UL — SIGNIFICANT CHANGE UP (ref 0–0.9)
MONOCYTES NFR BLD AUTO: 8 % — SIGNIFICANT CHANGE UP (ref 2–14)
NEUTROPHILS # BLD AUTO: 4.4 K/UL — SIGNIFICANT CHANGE UP (ref 1.8–7.4)
NEUTROPHILS NFR BLD AUTO: 58 % — SIGNIFICANT CHANGE UP (ref 43–77)
NITRITE UR-MCNC: NEGATIVE — SIGNIFICANT CHANGE UP
OVALOCYTES BLD QL SMEAR: SLIGHT — SIGNIFICANT CHANGE UP
PH UR: 8 — SIGNIFICANT CHANGE UP (ref 5–8)
PLAT MORPH BLD: NORMAL — SIGNIFICANT CHANGE UP
PLATELET # BLD AUTO: 556 K/UL — HIGH (ref 150–400)
POIKILOCYTOSIS BLD QL AUTO: SLIGHT — SIGNIFICANT CHANGE UP
POLYCHROMASIA BLD QL SMEAR: SLIGHT — SIGNIFICANT CHANGE UP
POTASSIUM SERPL-MCNC: 3.9 MMOL/L — SIGNIFICANT CHANGE UP (ref 3.5–5.3)
POTASSIUM SERPL-SCNC: 3.9 MMOL/L — SIGNIFICANT CHANGE UP (ref 3.5–5.3)
PROT SERPL-MCNC: 7.5 G/DL — SIGNIFICANT CHANGE UP (ref 6.6–8.7)
PROT UR-MCNC: 15
RBC # BLD: 3.86 M/UL — SIGNIFICANT CHANGE UP (ref 3.8–5.2)
RBC # FLD: 17.6 % — HIGH (ref 10.3–14.5)
RBC BLD AUTO: ABNORMAL
RBC CASTS # UR COMP ASSIST: SIGNIFICANT CHANGE UP /HPF (ref 0–4)
SCHISTOCYTES BLD QL AUTO: SLIGHT — SIGNIFICANT CHANGE UP
SMUDGE CELLS # BLD: PRESENT — SIGNIFICANT CHANGE UP
SODIUM SERPL-SCNC: 134 MMOL/L — LOW (ref 135–145)
SP GR SPEC: 1.01 — SIGNIFICANT CHANGE UP (ref 1.01–1.02)
UROBILINOGEN FLD QL: 4 MG/DL
VARIANT LYMPHS # BLD: 2.7 % — SIGNIFICANT CHANGE UP (ref 0–6)
WBC # BLD: 7.59 K/UL — SIGNIFICANT CHANGE UP (ref 3.8–10.5)
WBC # FLD AUTO: 7.59 K/UL — SIGNIFICANT CHANGE UP (ref 3.8–10.5)
WBC UR QL: SIGNIFICANT CHANGE UP /HPF (ref 0–5)

## 2022-10-01 PROCEDURE — 96374 THER/PROPH/DIAG INJ IV PUSH: CPT

## 2022-10-01 PROCEDURE — 85025 COMPLETE CBC W/AUTO DIFF WBC: CPT

## 2022-10-01 PROCEDURE — 81001 URINALYSIS AUTO W/SCOPE: CPT

## 2022-10-01 PROCEDURE — 96375 TX/PRO/DX INJ NEW DRUG ADDON: CPT

## 2022-10-01 PROCEDURE — 76815 OB US LIMITED FETUS(S): CPT | Mod: 26

## 2022-10-01 PROCEDURE — 83690 ASSAY OF LIPASE: CPT

## 2022-10-01 PROCEDURE — 99284 EMERGENCY DEPT VISIT MOD MDM: CPT

## 2022-10-01 PROCEDURE — 99284 EMERGENCY DEPT VISIT MOD MDM: CPT | Mod: 25

## 2022-10-01 PROCEDURE — 84702 CHORIONIC GONADOTROPIN TEST: CPT

## 2022-10-01 PROCEDURE — 76815 OB US LIMITED FETUS(S): CPT

## 2022-10-01 PROCEDURE — 80053 COMPREHEN METABOLIC PANEL: CPT

## 2022-10-01 PROCEDURE — 36415 COLL VENOUS BLD VENIPUNCTURE: CPT

## 2022-10-01 PROCEDURE — 96361 HYDRATE IV INFUSION ADD-ON: CPT

## 2022-10-01 RX ORDER — METOCLOPRAMIDE HCL 10 MG
10 TABLET ORAL ONCE
Refills: 0 | Status: COMPLETED | OUTPATIENT
Start: 2022-10-01 | End: 2022-10-01

## 2022-10-01 RX ORDER — ACETAMINOPHEN 500 MG
1000 TABLET ORAL ONCE
Refills: 0 | Status: COMPLETED | OUTPATIENT
Start: 2022-10-01 | End: 2022-10-01

## 2022-10-01 RX ORDER — FERROUS SULFATE 325(65) MG
1 TABLET ORAL
Qty: 90 | Refills: 0
Start: 2022-10-01 | End: 2022-10-30

## 2022-10-01 RX ORDER — DOCUSATE SODIUM 100 MG
1 CAPSULE ORAL
Qty: 7 | Refills: 0
Start: 2022-10-01 | End: 2022-10-07

## 2022-10-01 RX ORDER — SODIUM CHLORIDE 9 MG/ML
1000 INJECTION INTRAMUSCULAR; INTRAVENOUS; SUBCUTANEOUS ONCE
Refills: 0 | Status: COMPLETED | OUTPATIENT
Start: 2022-10-01 | End: 2022-10-01

## 2022-10-01 RX ORDER — ONDANSETRON 8 MG/1
4 TABLET, FILM COATED ORAL ONCE
Refills: 0 | Status: COMPLETED | OUTPATIENT
Start: 2022-10-01 | End: 2022-10-01

## 2022-10-01 RX ADMIN — SODIUM CHLORIDE 1000 MILLILITER(S): 9 INJECTION INTRAMUSCULAR; INTRAVENOUS; SUBCUTANEOUS at 09:38

## 2022-10-01 RX ADMIN — Medication 10 MILLIGRAM(S): at 11:08

## 2022-10-01 RX ADMIN — SODIUM CHLORIDE 1000 MILLILITER(S): 9 INJECTION INTRAMUSCULAR; INTRAVENOUS; SUBCUTANEOUS at 10:54

## 2022-10-01 RX ADMIN — Medication 1000 MILLIGRAM(S): at 09:54

## 2022-10-01 RX ADMIN — Medication 400 MILLIGRAM(S): at 09:41

## 2022-10-01 RX ADMIN — ONDANSETRON 4 MILLIGRAM(S): 8 TABLET, FILM COATED ORAL at 09:40

## 2022-10-01 NOTE — ED ADULT TRIAGE NOTE - CHIEF COMPLAINT QUOTE
Pt presenting with sharp abdominal pain in the umbilical area x12 hours. LMP 6/10/2022, 16 weeks gestation uncomplicated pregnancy. Pt reports nausea, denies vaginal bleeding, vomiting, diarrhea. h/o pre-eclampsia with previous pregnancy 12/2020, hx fibroids, asthma.

## 2022-10-01 NOTE — ED STATDOCS - OBJECTIVE STATEMENT
23 y/o female with  @ 16 weeks pmhx of preeclampsia, uterine fibors, and asthma, c/o sharp abdominal pain x 12x hours with nausea. Pt had same episode last week which went away. Denies dysuria, fever, chills, vomiting. Pt states unsure how many weeks she developed preeclampsia and needed an emergency . Allergy to oxycodone 23 y/o female with  @ 16 weeks pmhx of preeclampsia, uterine fibroids, and asthma, c/o sharp abdominal pain x 12x hours with nausea. Pt had same episode last week which went away. Denies dysuria, fever, chills, vomiting. Pt states unsure how many weeks she developed preeclampsia and needed an emergency . Allergy to oxycodone

## 2022-10-01 NOTE — ED STATDOCS - PROGRESS NOTE DETAILS
PT evaluated by intake physician. HPI/ROS/PE as noted above. Will follow up plan per intake physician Upon reevaluation, pt reports improvement of symptoms in the ED. Abdomen soft non tender. Labs with H/H 7.4/25.3 (at baseline, pt reports not taking her iron pills for few years now due to side effects such as constipation), HCG at 12,735 and US OB with positive IUP 16weeks 6 days with positive FHR. Instructed to take iron pills/colace and OTC tylenol and to f/u with her GYN as scheduled on 10/8/22. Bleeding precautions given. Strict ED return precautions given.

## 2022-10-01 NOTE — ED STATDOCS - ATTENDING APP SHARED VISIT CONTRIBUTION OF CARE
LAVELLE Hogan: see mdm    I have personally performed a face to face diagnostic evaluation on this patient.  I have reviewed the ACP note and agree with the history, exam, and plan of care, except as noted.   My medical decision making and observations are found above.

## 2022-10-01 NOTE — ED ADULT NURSE NOTE - OBJECTIVE STATEMENT
periumbilical paiin that is worse with movement and nausea/vomiting since yesterday. as per pt, currently 16 weeks pregnant.

## 2022-10-01 NOTE — ED STATDOCS - CLINICAL SUMMARY MEDICAL DECISION MAKING FREE TEXT BOX
22 y/ female with hx preeclampsia, A0 c/o abdominal pain in setting of pregnancy. tenderness in suprapubic region thou reporting pain along pelvic region. Will check labs, imaging, pain control, reassess and dispo.

## 2022-10-01 NOTE — ED ADULT NURSE NOTE - NSIMPLEMENTINTERV_GEN_ALL_ED
Implemented All Universal Safety Interventions:  [place] to call system. Call bell, personal items and telephone within reach. Instruct patient to call for assistance. Room bathroom lighting operational. Non-slip footwear when patient is off stretcher. Physically safe environment: no spills, clutter or unnecessary equipment. Stretcher in lowest position, wheels locked, appropriate side rails in place.
[id number] Comprehensive

## 2022-10-01 NOTE — ED STATDOCS - NSFOLLOWUPINSTRUCTIONS_ED_ALL_ED_FT
Please take all medications as prescribed   Take tylenol over the counter as needed for pain  Follow up with your OB GYN as scheduled on 10/8  Return to the emergency room if you are experiencing any worsening symptoms     SEEK IMMEDIATE MEDICAL CARE IF YOU HAVE ANY OF THE FOLLOWING SYMPTOMS: worsening abdominal pain, vaginal bleeding, uncontrollable vomiting, profuse diarrhea, inability to have bowel movements or pass gas, black or bloody stools, fever accompanying chest pain or back pain, or fainting. These symptoms may represent a serious problem that is an emergency. Do not wait to see if the symptoms will go away. Get medical help right away. Call 911 and do not drive yourself to the hospital.

## 2022-10-01 NOTE — ED ADULT NURSE NOTE - BREATHING, MLM
Calling to see if provider put patient on disability.    Please call back.   Spontaneous, unlabored and symmetrical

## 2022-10-01 NOTE — ED STATDOCS - PATIENT PORTAL LINK FT
You can access the FollowMyHealth Patient Portal offered by [facility] by registering at the following website: [email or web address]. By joining [employer]’s FollowMyHealth portal, you will also be able to view your health information using other applications (apps) compatible with our system.

## 2022-10-06 ENCOUNTER — NON-APPOINTMENT (OUTPATIENT)
Age: 22
End: 2022-10-06

## 2022-10-06 ENCOUNTER — APPOINTMENT (OUTPATIENT)
Dept: OBGYN | Facility: CLINIC | Age: 22
End: 2022-10-06

## 2022-10-06 ENCOUNTER — APPOINTMENT (OUTPATIENT)
Dept: ANTEPARTUM | Facility: CLINIC | Age: 22
End: 2022-10-06

## 2022-10-06 VITALS
WEIGHT: 159 LBS | SYSTOLIC BLOOD PRESSURE: 116 MMHG | HEIGHT: 64 IN | BODY MASS INDEX: 27.14 KG/M2 | DIASTOLIC BLOOD PRESSURE: 74 MMHG

## 2022-10-06 PROCEDURE — 81003 URINALYSIS AUTO W/O SCOPE: CPT | Mod: QW

## 2022-10-06 PROCEDURE — 36415 COLL VENOUS BLD VENIPUNCTURE: CPT

## 2022-10-06 PROCEDURE — 0502F SUBSEQUENT PRENATAL CARE: CPT

## 2022-10-06 RX ORDER — ASPIRIN 81 MG
81 TABLET, DELAYED RELEASE (ENTERIC COATED) ORAL
Refills: 0 | Status: ACTIVE | COMMUNITY

## 2022-10-09 ENCOUNTER — NON-APPOINTMENT (OUTPATIENT)
Age: 22
End: 2022-10-09

## 2022-10-12 LAB
ADDITIONAL US: NORMAL
AFP MOM: 0.71
AFP VALUE: 25.9 NG/ML
COLLECTED ON 2: NORMAL
COLLECTED ON: NORMAL
CRL SCAN TWIN B: NORMAL
CRL SCAN: NORMAL
CROWN RUMP LENGTH TWIN B: NORMAL
CROWN RUMP LENGTH: 60.2 MM
DIA MOM: 0.81
DIA VALUE: 119.8 PG/ML
DOWN SYNDROME AGE RISK: NORMAL
DOWN SYNDROME INTERPRETATION: NORMAL
DOWN SYNDROME SCREENING RISK: NORMAL
FIRST TRIMESTER SAMPLE: NORMAL
GEST. AGE ON COLLECTION DATE: 12.3 WEEKS
GESTATIONAL AGE: 17.1 WEEKS
HCG MOM: 0.46
HCG VALUE: 13.5 IU/ML
INSULIN DEP DIABETES: NO
MATERNAL AGE AT EDD: 25.2 YR
NT MOM TWIN B: NORMAL
NT TWIN B: NORMAL
NUCHAL TRANSLUCENCY (NT): 1.4 MM
NUCHAL TRANSLUCENCY MOM: 0.88
NUMBER OF FETUSES: 1
OPEN SPINA BIFIDA: NORMAL
OSB INTERPRETATION: NORMAL
PAPP-A MOM: 1138.9 NG/ML
PAPP-A VALUE: 1.31
RACE: NORMAL
SECOND TRIMESTER SAMPLE: NORMAL
SEQUENTIAL 2 COMMENTS: NORMAL
SEQUENTIAL 2 NOTE: NORMAL
SEQUENTIAL 2 RESULTS: NORMAL
SEQUENTIAL 2 TEST RESULTS: NORMAL
SONOGRAPHER ID#: NORMAL
TRISOMY 18 AGE RISK: NORMAL
TRISOMY 18 INTERPRETATION: NORMAL
TRISOMY 18 SCREENING RISK: NORMAL
UE3 MOM: 0.85
UE3 VALUE: 1.02 NG/ML
WEIGHT AFP: 153 LBS
WEIGHT: 161 LBS

## 2022-10-13 ENCOUNTER — NON-APPOINTMENT (OUTPATIENT)
Age: 22
End: 2022-10-13

## 2022-10-18 ENCOUNTER — APPOINTMENT (OUTPATIENT)
Dept: ANTEPARTUM | Facility: CLINIC | Age: 22
End: 2022-10-18

## 2022-10-18 ENCOUNTER — TRANSCRIPTION ENCOUNTER (OUTPATIENT)
Age: 22
End: 2022-10-18

## 2022-10-28 ENCOUNTER — ASOB RESULT (OUTPATIENT)
Age: 22
End: 2022-10-28

## 2022-10-28 ENCOUNTER — APPOINTMENT (OUTPATIENT)
Dept: ANTEPARTUM | Facility: CLINIC | Age: 22
End: 2022-10-28

## 2022-10-28 PROCEDURE — 76811 OB US DETAILED SNGL FETUS: CPT

## 2022-10-28 PROCEDURE — 76817 TRANSVAGINAL US OBSTETRIC: CPT | Mod: 59

## 2022-11-19 ENCOUNTER — OFFICE (OUTPATIENT)
Dept: URBAN - METROPOLITAN AREA CLINIC 94 | Facility: CLINIC | Age: 22
Setting detail: OPHTHALMOLOGY
End: 2022-11-19
Payer: MEDICAID

## 2022-11-19 DIAGNOSIS — H43.393: ICD-10-CM

## 2022-11-19 PROCEDURE — 92134 CPTRZ OPH DX IMG PST SGM RTA: CPT | Performed by: SPECIALIST

## 2022-11-19 PROCEDURE — 92250 FUNDUS PHOTOGRAPHY W/I&R: CPT | Performed by: SPECIALIST

## 2022-11-19 ASSESSMENT — TONOMETRY
OD_IOP_MMHG: 17
OS_IOP_MMHG: 17

## 2022-11-19 ASSESSMENT — CONFRONTATIONAL VISUAL FIELD TEST (CVF)
OD_FINDINGS: FULL
OS_FINDINGS: FULL

## 2022-11-19 ASSESSMENT — VISUAL ACUITY
OS_BCVA: 20/25
OD_BCVA: 20/25

## 2022-11-25 ENCOUNTER — RESULT REVIEW (OUTPATIENT)
Age: 22
End: 2022-11-25

## 2022-11-26 ENCOUNTER — OUTPATIENT (OUTPATIENT)
Dept: INPATIENT UNIT | Facility: HOSPITAL | Age: 22
LOS: 1 days | End: 2022-11-26
Payer: MEDICAID

## 2022-11-26 VITALS
SYSTOLIC BLOOD PRESSURE: 102 MMHG | RESPIRATION RATE: 18 BRPM | DIASTOLIC BLOOD PRESSURE: 56 MMHG | TEMPERATURE: 99 F | HEART RATE: 97 BPM

## 2022-11-26 VITALS
RESPIRATION RATE: 22 BRPM | DIASTOLIC BLOOD PRESSURE: 66 MMHG | HEART RATE: 93 BPM | TEMPERATURE: 98 F | SYSTOLIC BLOOD PRESSURE: 117 MMHG

## 2022-11-26 DIAGNOSIS — O47.03 FALSE LABOR BEFORE 37 COMPLETED WEEKS OF GESTATION, THIRD TRIMESTER: ICD-10-CM

## 2022-11-26 LAB
APPEARANCE UR: CLEAR — SIGNIFICANT CHANGE UP
BACTERIA # UR AUTO: ABNORMAL
BILIRUB UR-MCNC: NEGATIVE — SIGNIFICANT CHANGE UP
COLOR SPEC: YELLOW — SIGNIFICANT CHANGE UP
DIFF PNL FLD: NEGATIVE — SIGNIFICANT CHANGE UP
EPI CELLS # UR: ABNORMAL
FERRITIN SERPL-MCNC: 4 NG/ML — LOW (ref 15–150)
GLUCOSE UR QL: NEGATIVE MG/DL — SIGNIFICANT CHANGE UP
HCT VFR BLD CALC: 24.4 % — LOW (ref 34.5–45)
HGB BLD-MCNC: 6.9 G/DL — CRITICAL LOW (ref 11.5–15.5)
KETONES UR-MCNC: NEGATIVE — SIGNIFICANT CHANGE UP
LEUKOCYTE ESTERASE UR-ACNC: NEGATIVE — SIGNIFICANT CHANGE UP
MCHC RBC-ENTMCNC: 18 PG — LOW (ref 27–34)
MCHC RBC-ENTMCNC: 28.3 GM/DL — LOW (ref 32–36)
MCV RBC AUTO: 63.5 FL — LOW (ref 80–100)
NITRITE UR-MCNC: NEGATIVE — SIGNIFICANT CHANGE UP
PH UR: 7 — SIGNIFICANT CHANGE UP (ref 5–8)
PLATELET # BLD AUTO: 493 K/UL — HIGH (ref 150–400)
PROT UR-MCNC: NEGATIVE — SIGNIFICANT CHANGE UP
RBC # BLD: 3.84 M/UL — SIGNIFICANT CHANGE UP (ref 3.8–5.2)
RBC # FLD: 20.2 % — HIGH (ref 10.3–14.5)
RBC CASTS # UR COMP ASSIST: SIGNIFICANT CHANGE UP /HPF (ref 0–4)
SP GR SPEC: 1.01 — SIGNIFICANT CHANGE UP (ref 1.01–1.02)
UROBILINOGEN FLD QL: 4 MG/DL
WBC # BLD: 8.97 K/UL — SIGNIFICANT CHANGE UP (ref 3.8–10.5)
WBC # FLD AUTO: 8.97 K/UL — SIGNIFICANT CHANGE UP (ref 3.8–10.5)
WBC UR QL: SIGNIFICANT CHANGE UP /HPF (ref 0–5)

## 2022-11-26 PROCEDURE — 76775 US EXAM ABDO BACK WALL LIM: CPT | Mod: 26

## 2022-11-26 PROCEDURE — 76775 US EXAM ABDO BACK WALL LIM: CPT

## 2022-11-26 PROCEDURE — 83020 HEMOGLOBIN ELECTROPHORESIS: CPT

## 2022-11-26 PROCEDURE — 36415 COLL VENOUS BLD VENIPUNCTURE: CPT

## 2022-11-26 PROCEDURE — 99214 OFFICE O/P EST MOD 30 MIN: CPT | Mod: GC

## 2022-11-26 PROCEDURE — 81001 URINALYSIS AUTO W/SCOPE: CPT

## 2022-11-26 PROCEDURE — 59025 FETAL NON-STRESS TEST: CPT

## 2022-11-26 PROCEDURE — 83020 HEMOGLOBIN ELECTROPHORESIS: CPT | Mod: 26

## 2022-11-26 PROCEDURE — G0463: CPT

## 2022-11-26 PROCEDURE — 85027 COMPLETE CBC AUTOMATED: CPT

## 2022-11-26 PROCEDURE — 82728 ASSAY OF FERRITIN: CPT

## 2022-11-26 RX ORDER — SODIUM CHLORIDE 9 MG/ML
1000 INJECTION, SOLUTION INTRAVENOUS ONCE
Refills: 0 | Status: COMPLETED | OUTPATIENT
Start: 2022-11-26 | End: 2022-11-26

## 2022-11-26 RX ORDER — IRON SUCROSE 20 MG/ML
200 INJECTION, SOLUTION INTRAVENOUS ONCE
Refills: 0 | Status: COMPLETED | OUTPATIENT
Start: 2022-11-26 | End: 2022-11-26

## 2022-11-26 RX ORDER — ONDANSETRON 8 MG/1
4 TABLET, FILM COATED ORAL ONCE
Refills: 0 | Status: COMPLETED | OUTPATIENT
Start: 2022-11-26 | End: 2022-11-26

## 2022-11-26 RX ORDER — ACETAMINOPHEN 500 MG
975 TABLET ORAL ONCE
Refills: 0 | Status: COMPLETED | OUTPATIENT
Start: 2022-11-26 | End: 2022-11-26

## 2022-11-26 RX ADMIN — SODIUM CHLORIDE 1000 MILLILITER(S): 9 INJECTION, SOLUTION INTRAVENOUS at 04:23

## 2022-11-26 RX ADMIN — Medication 975 MILLIGRAM(S): at 02:15

## 2022-11-26 RX ADMIN — IRON SUCROSE 110 MILLIGRAM(S): 20 INJECTION, SOLUTION INTRAVENOUS at 06:06

## 2022-11-26 RX ADMIN — Medication 975 MILLIGRAM(S): at 03:15

## 2022-11-26 RX ADMIN — ONDANSETRON 4 MILLIGRAM(S): 8 TABLET, FILM COATED ORAL at 04:23

## 2022-11-26 NOTE — OB PROVIDER TRIAGE NOTE - HISTORY OF PRESENT ILLNESS
DIMITRIOS WELCH is a 22y  at 24w5d GA who presents via EMS to L&D for bilateral hip pain that is described as sharp, 10/10 nonradiating pain that is constant, not a associated with movement. She states that the pain woke her up out of her sleep. She has not tried any alleviating agents at home. She states that nothing makes the pain worse. She also reports back pain and have left sided CVA tenderness in triage. She denies any urinary complaints. She also reports nausea with no episodes of vomiting. Pt denies vaginal bleeding, contractions and leakage of fluid. She endorses good fetal movement. No other complaints at this time.     Pregnancy course is  uncomplicated.     POB: 2020: CS 2/2 NRFHT complicated by PEC s/p magnesium  PGYN: +fibroids/+cysts, denied STD hx, LSIL on PAP  PMH: anemia, migraines, kidney stones  PSH: CSx1   SH: Denies tobacco use, EtOH use and illicit drug use during the pregnancy; Feels safe at home  Meds: PNV, ASA 81, iron   All: oxycodone (rash)

## 2022-11-26 NOTE — OB RN TRIAGE NOTE - NSICDXPASTMEDICALHX_GEN_ALL_CORE_FT
PAST MEDICAL HISTORY:  Asthma     Fibroids     Kidney stones     Migraines     Preeclampsia      PAST MEDICAL HISTORY:  Anemia of chronic disease     Asthma     Fibroids     Kidney stones     Migraines     Preeclampsia

## 2022-11-26 NOTE — OB PROVIDER TRIAGE NOTE - ATTENDING COMMENTS
This is a 22y  at 24w5d with sharp onset bilateral hip pain. The patient reports having intermittent abdominal pain in pregnancy but this pain was new and woke her from sleep. She reports it is localized to both hip joints and she cannot get comfortable. Vital signs normal. Fetal monitoring appropriate for gestational age without evidence of contractions. On exam, abdomen gravid, non tender. Cervix 0/0/ballotable. No vaginal bleeding. There is no CVA tenderness. Urinalysis normal. Renal ultrasound without evidence of kidney stones. Labs collected significant for iron deficiency anemia with ferritin of 4. Patient admits to not taking PO ferrous sulfate at home.  Will follow up results of Hgb Electrophoresis and additionally administer IV iron prior to discharge. She should follow up with provider next week to discuss scheduled IV iron infusions to improve anemia prior to delivery if unable to take in PO. Patient's pain improved with PO tylenol- suspect physiologic pains of pregnancy as reason for pain. She should consider binder/ pregnancy support belt to help with symptoms as well.    Vivi Wilson MD

## 2022-11-26 NOTE — OB PROVIDER TRIAGE NOTE - NSHPPHYSICALEXAM_GEN_ALL_CORE
T(C): 36.6 (11-26-22 @ 01:42), Max: 36.6 (11-26-22 @ 01:42)  HR: 93 (11-26-22 @ 01:47) (93 - 93)  BP: 117/66 (11-26-22 @ 01:47) (117/66 - 117/66)  RR: 22 (11-26-22 @ 01:42) (22 - 22)    Gen: NAD, well-appearing  Abd: soft, gravid  Ext: non-edematous, non-tender   FHT: baseline 150, moderate variability, +accels, -decels   [place]: No contractions

## 2022-11-26 NOTE — OB RN TRIAGE NOTE - NSNURSINGINSTR_OBGYN_ALL_OB_FT
Nostril Rim Text: The closure involved the nostril rim. Take iron supplement as ordered and follow up at next scheduled OB visit.

## 2022-11-26 NOTE — OB RN TRIAGE NOTE - FALL HARM RISK - UNIVERSAL INTERVENTIONS
Bed in lowest position, wheels locked, appropriate side rails in place/Call bell, personal items and telephone in reach/Instruct patient to call for assistance before getting out of bed or chair/Non-slip footwear when patient is out of bed/[place] to call system/Physically safe environment - no spills, clutter or unnecessary equipment/Purposeful Proactive Rounding/Room/bathroom lighting operational, light cord in reach

## 2022-11-26 NOTE — OB PROVIDER TRIAGE NOTE - NSOBPROVIDERNOTE_OBGYN_ALL_OB_FT
A/P: DIMITRIOS WELCH is a 22y  at 24w5d GA assessed for r/o kidney stones  - Zofran   - f/u renal US  - 1L LR bolus  - acetaminophen for pain management    Fetus: Reassuring   [place]: No contractions  Dispo: Continue to observe.     Discussed with Dr. Wilson A/P: DIMITRIOS WELCH is a 22y  at 24w5d GA assessed for r/o kidney stones  - Zofran   - f/u renal US  - 1L LR bolus  - acetaminophen for pain management    Fetus: Reassuring   [place]: No contractions      UPDATE  - pain is controlled with Tylenol  - Renal ultrasound shows mild hydronephrosis  - UA wnl  - Patient advised to continue to take Tylenol at home as needed and to use a bellyband  - Patient will continue to follow up with Dr. Avina     Dispo: Home   Discussed with Dr. Wilson

## 2022-11-30 LAB
HEMOGLOBIN INTERPRETATION: SIGNIFICANT CHANGE UP
HGB A MFR BLD: 98.2 % — HIGH (ref 95.8–98)
HGB A2 MFR BLD: 1.8 % — LOW (ref 2–3.2)

## 2023-01-10 ENCOUNTER — OUTPATIENT (OUTPATIENT)
Dept: OUTPATIENT SERVICES | Facility: HOSPITAL | Age: 23
LOS: 1 days | End: 2023-01-10
Payer: MEDICAID

## 2023-01-10 ENCOUNTER — EMERGENCY (EMERGENCY)
Facility: HOSPITAL | Age: 23
LOS: 1 days | Discharge: ANOTHER HOSPITAL | End: 2023-01-10
Attending: EMERGENCY MEDICINE | Admitting: EMERGENCY MEDICINE
Payer: MEDICAID

## 2023-01-10 VITALS
WEIGHT: 164.91 LBS | OXYGEN SATURATION: 99 % | RESPIRATION RATE: 16 BRPM | DIASTOLIC BLOOD PRESSURE: 66 MMHG | HEIGHT: 64 IN | TEMPERATURE: 98 F | HEART RATE: 108 BPM | SYSTOLIC BLOOD PRESSURE: 113 MMHG

## 2023-01-10 VITALS
DIASTOLIC BLOOD PRESSURE: 57 MMHG | RESPIRATION RATE: 16 BRPM | TEMPERATURE: 98 F | HEART RATE: 83 BPM | SYSTOLIC BLOOD PRESSURE: 103 MMHG

## 2023-01-10 VITALS
DIASTOLIC BLOOD PRESSURE: 66 MMHG | SYSTOLIC BLOOD PRESSURE: 116 MMHG | RESPIRATION RATE: 16 BRPM | TEMPERATURE: 98 F | HEART RATE: 91 BPM

## 2023-01-10 DIAGNOSIS — Z98.891 HISTORY OF UTERINE SCAR FROM PREVIOUS SURGERY: Chronic | ICD-10-CM

## 2023-01-10 DIAGNOSIS — O47.03 FALSE LABOR BEFORE 37 COMPLETED WEEKS OF GESTATION, THIRD TRIMESTER: ICD-10-CM

## 2023-01-10 DIAGNOSIS — O21.0 MILD HYPEREMESIS GRAVIDARUM: Chronic | ICD-10-CM

## 2023-01-10 PROBLEM — D63.8 ANEMIA IN OTHER CHRONIC DISEASES CLASSIFIED ELSEWHERE: Chronic | Status: ACTIVE | Noted: 2022-11-26

## 2023-01-10 PROBLEM — O14.90 UNSPECIFIED PRE-ECLAMPSIA, UNSPECIFIED TRIMESTER: Chronic | Status: ACTIVE | Noted: 2022-11-26

## 2023-01-10 LAB
ALBUMIN SERPL ELPH-MCNC: 2.7 G/DL — LOW (ref 3.3–5)
ALP SERPL-CCNC: 61 U/L — SIGNIFICANT CHANGE UP (ref 40–120)
ALT FLD-CCNC: 14 U/L — SIGNIFICANT CHANGE UP (ref 12–78)
ANION GAP SERPL CALC-SCNC: 9 MMOL/L — SIGNIFICANT CHANGE UP (ref 5–17)
APPEARANCE UR: ABNORMAL
APTT BLD: 26.4 SEC — LOW (ref 27.5–35.5)
AST SERPL-CCNC: 12 U/L — LOW (ref 15–37)
BASOPHILS # BLD AUTO: 0.06 K/UL — SIGNIFICANT CHANGE UP (ref 0–0.2)
BASOPHILS NFR BLD AUTO: 0.8 % — SIGNIFICANT CHANGE UP (ref 0–2)
BILIRUB SERPL-MCNC: 0.4 MG/DL — SIGNIFICANT CHANGE UP (ref 0.2–1.2)
BILIRUB UR-MCNC: NEGATIVE — SIGNIFICANT CHANGE UP
BUN SERPL-MCNC: 12 MG/DL — SIGNIFICANT CHANGE UP (ref 7–23)
CALCIUM SERPL-MCNC: 8.6 MG/DL — SIGNIFICANT CHANGE UP (ref 8.5–10.1)
CHLORIDE SERPL-SCNC: 107 MMOL/L — SIGNIFICANT CHANGE UP (ref 96–108)
CO2 SERPL-SCNC: 23 MMOL/L — SIGNIFICANT CHANGE UP (ref 22–31)
COLOR SPEC: YELLOW — SIGNIFICANT CHANGE UP
CREAT SERPL-MCNC: 0.45 MG/DL — LOW (ref 0.5–1.3)
DIFF PNL FLD: NEGATIVE — SIGNIFICANT CHANGE UP
EGFR: 137 ML/MIN/1.73M2 — SIGNIFICANT CHANGE UP
EOSINOPHIL # BLD AUTO: 0.14 K/UL — SIGNIFICANT CHANGE UP (ref 0–0.5)
EOSINOPHIL NFR BLD AUTO: 1.8 % — SIGNIFICANT CHANGE UP (ref 0–6)
GLUCOSE SERPL-MCNC: 83 MG/DL — SIGNIFICANT CHANGE UP (ref 70–99)
GLUCOSE UR QL: NEGATIVE — SIGNIFICANT CHANGE UP
HCT VFR BLD CALC: 27.1 % — LOW (ref 34.5–45)
HGB BLD-MCNC: 7.4 G/DL — LOW (ref 11.5–15.5)
IMM GRANULOCYTES NFR BLD AUTO: 0.9 % — SIGNIFICANT CHANGE UP (ref 0–0.9)
INR BLD: 1.06 RATIO — SIGNIFICANT CHANGE UP (ref 0.88–1.16)
KETONES UR-MCNC: NEGATIVE — SIGNIFICANT CHANGE UP
LEUKOCYTE ESTERASE UR-ACNC: ABNORMAL
LIDOCAIN IGE QN: 120 U/L — SIGNIFICANT CHANGE UP (ref 73–393)
LYMPHOCYTES # BLD AUTO: 1.4 K/UL — SIGNIFICANT CHANGE UP (ref 1–3.3)
LYMPHOCYTES # BLD AUTO: 18.2 % — SIGNIFICANT CHANGE UP (ref 13–44)
MCHC RBC-ENTMCNC: 17.8 PG — LOW (ref 27–34)
MCHC RBC-ENTMCNC: 27.3 GM/DL — LOW (ref 32–36)
MCV RBC AUTO: 65.3 FL — LOW (ref 80–100)
MONOCYTES # BLD AUTO: 0.82 K/UL — SIGNIFICANT CHANGE UP (ref 0–0.9)
MONOCYTES NFR BLD AUTO: 10.7 % — SIGNIFICANT CHANGE UP (ref 2–14)
NEUTROPHILS # BLD AUTO: 5.19 K/UL — SIGNIFICANT CHANGE UP (ref 1.8–7.4)
NEUTROPHILS NFR BLD AUTO: 67.6 % — SIGNIFICANT CHANGE UP (ref 43–77)
NITRITE UR-MCNC: NEGATIVE — SIGNIFICANT CHANGE UP
NRBC # BLD: 0 /100 WBCS — SIGNIFICANT CHANGE UP (ref 0–0)
PH UR: 7 — SIGNIFICANT CHANGE UP (ref 5–8)
PLATELET # BLD AUTO: 384 K/UL — SIGNIFICANT CHANGE UP (ref 150–400)
POTASSIUM SERPL-MCNC: 4 MMOL/L — SIGNIFICANT CHANGE UP (ref 3.5–5.3)
POTASSIUM SERPL-SCNC: 4 MMOL/L — SIGNIFICANT CHANGE UP (ref 3.5–5.3)
PROT SERPL-MCNC: 7.7 G/DL — SIGNIFICANT CHANGE UP (ref 6–8.3)
PROT UR-MCNC: 15
PROTHROM AB SERPL-ACNC: 12.4 SEC — SIGNIFICANT CHANGE UP (ref 10.5–13.4)
RBC # BLD: 4.15 M/UL — SIGNIFICANT CHANGE UP (ref 3.8–5.2)
RBC # FLD: 24.7 % — HIGH (ref 10.3–14.5)
SARS-COV-2 RNA SPEC QL NAA+PROBE: SIGNIFICANT CHANGE UP
SODIUM SERPL-SCNC: 139 MMOL/L — SIGNIFICANT CHANGE UP (ref 135–145)
SP GR SPEC: 1.01 — SIGNIFICANT CHANGE UP (ref 1.01–1.02)
UROBILINOGEN FLD QL: 8
WBC # BLD: 7.68 K/UL — SIGNIFICANT CHANGE UP (ref 3.8–10.5)
WBC # FLD AUTO: 7.68 K/UL — SIGNIFICANT CHANGE UP (ref 3.8–10.5)

## 2023-01-10 PROCEDURE — 83690 ASSAY OF LIPASE: CPT

## 2023-01-10 PROCEDURE — 87491 CHLMYD TRACH DNA AMP PROBE: CPT

## 2023-01-10 PROCEDURE — 87798 DETECT AGENT NOS DNA AMP: CPT

## 2023-01-10 PROCEDURE — 59025 FETAL NON-STRESS TEST: CPT

## 2023-01-10 PROCEDURE — 87077 CULTURE AEROBIC IDENTIFY: CPT

## 2023-01-10 PROCEDURE — 87661 TRICHOMONAS VAGINALIS AMPLIF: CPT

## 2023-01-10 PROCEDURE — 96374 THER/PROPH/DIAG INJ IV PUSH: CPT

## 2023-01-10 PROCEDURE — 99285 EMERGENCY DEPT VISIT HI MDM: CPT

## 2023-01-10 PROCEDURE — 87801 DETECT AGNT MULT DNA AMPLI: CPT

## 2023-01-10 PROCEDURE — G0463: CPT

## 2023-01-10 PROCEDURE — 87070 CULTURE OTHR SPECIMN AEROBIC: CPT

## 2023-01-10 PROCEDURE — 36415 COLL VENOUS BLD VENIPUNCTURE: CPT

## 2023-01-10 PROCEDURE — 85610 PROTHROMBIN TIME: CPT

## 2023-01-10 PROCEDURE — 80053 COMPREHEN METABOLIC PANEL: CPT

## 2023-01-10 PROCEDURE — 87635 SARS-COV-2 COVID-19 AMP PRB: CPT

## 2023-01-10 PROCEDURE — 96375 TX/PRO/DX INJ NEW DRUG ADDON: CPT

## 2023-01-10 PROCEDURE — 81001 URINALYSIS AUTO W/SCOPE: CPT

## 2023-01-10 PROCEDURE — 85025 COMPLETE CBC W/AUTO DIFF WBC: CPT

## 2023-01-10 PROCEDURE — 76817 TRANSVAGINAL US OBSTETRIC: CPT

## 2023-01-10 PROCEDURE — 85730 THROMBOPLASTIN TIME PARTIAL: CPT

## 2023-01-10 PROCEDURE — 99284 EMERGENCY DEPT VISIT MOD MDM: CPT | Mod: 25

## 2023-01-10 PROCEDURE — 87591 N.GONORRHOEAE DNA AMP PROB: CPT

## 2023-01-10 PROCEDURE — 87800 DETECT AGNT MULT DNA DIREC: CPT

## 2023-01-10 RX ORDER — ACETAMINOPHEN 500 MG
1000 TABLET ORAL ONCE
Refills: 0 | Status: COMPLETED | OUTPATIENT
Start: 2023-01-10 | End: 2023-01-10

## 2023-01-10 RX ORDER — ONDANSETRON 8 MG/1
4 TABLET, FILM COATED ORAL ONCE
Refills: 0 | Status: COMPLETED | OUTPATIENT
Start: 2023-01-10 | End: 2023-01-10

## 2023-01-10 RX ORDER — SODIUM CHLORIDE 9 MG/ML
1000 INJECTION INTRAMUSCULAR; INTRAVENOUS; SUBCUTANEOUS ONCE
Refills: 0 | Status: COMPLETED | OUTPATIENT
Start: 2023-01-10 | End: 2023-01-10

## 2023-01-10 RX ORDER — LIDOCAINE 4 G/100G
1 CREAM TOPICAL ONCE
Refills: 0 | Status: COMPLETED | OUTPATIENT
Start: 2023-01-10 | End: 2023-01-10

## 2023-01-10 RX ADMIN — Medication 400 MILLIGRAM(S): at 05:21

## 2023-01-10 RX ADMIN — LIDOCAINE 1 PATCH: 4 CREAM TOPICAL at 05:23

## 2023-01-10 RX ADMIN — ONDANSETRON 4 MILLIGRAM(S): 8 TABLET, FILM COATED ORAL at 05:21

## 2023-01-10 RX ADMIN — SODIUM CHLORIDE 1000 MILLILITER(S): 9 INJECTION INTRAMUSCULAR; INTRAVENOUS; SUBCUTANEOUS at 05:21

## 2023-01-10 NOTE — ED PROVIDER NOTE - CONSIDERATION OF ADMISSION OBSERVATION
Transfer to [place] for fetal monitoring and OB care Consideration of Admission/Observation

## 2023-01-10 NOTE — OB PROVIDER TRIAGE NOTE - NSOBPROVIDERNOTE_OBGYN_ALL_OB_FT
A/P: DIMITRIOS WELCH is a 23y  at 30w4d weeks GA who presents to L&D, transferred from [place] for back pain, with irregular ctx on toco    Fetus: reactive and reassuring  [place]: q2-6min  Collected FFN, GBS, Affirm, GC/CT swabs  IL IVF bolus  Pending cervical length  Reassess ctx pattern and exam  Dispo: Continue to observe    Discussed with Dr. Avina A/P: DIMITRIOS WELCH is a 23y  at 30w4d weeks GA who presents to L&D, transferred from [place] for back pain, with irregular ctx on toco    Fetus: reactive and reassuring  [place]: q2-6min  Collected FFN, GBS, Affirm, GC/CT swabs  IL IVF bolus  Pending cervical length  Reassess ctx pattern and exam  Dispo: Continue to observe    Patient feels better after IVF bolus  Cervical length 3.5cm  Contractions spaced out - no ctx currently  Fetus: 130/mod/+accels/-decels  Provided labor precautions and PEC symptom precautions  Dispo: Discharge to home    Discussed with Dr. Avina

## 2023-01-10 NOTE — ED PROVIDER NOTE - CLINICAL SUMMARY MEDICAL DECISION MAKING FREE TEXT BOX
23-year-old female G2 para 1 at 30 weeks and 3 days gestation, estimated delivery is 2023 presenting with lower back pain for 3 days unresponsive to Tylenol without vaginal bleeding or discharge or abdominal pain.  Reports positive fetal movements.  Denies any injury.  Denies urinary complaints.  Physical exam is remarkable for tenderness over the bilateral lower paraspinal muscles.  Abdomen is soft, gravid and nontender.  Will give pain management.  We will check labs to evaluate for preeclampsia.  Pain could be musculoskeletal however considering patient's gestational age  labor is in the differential.  There is no L&D [facility].  We will call the transfer center to transfer patient for L&D evaluation and fetal monitoring. 23-year-old female G2 para 1 at 30 weeks and 3 days gestation, estimated delivery is 2023 presenting with lower back pain for 3 days unresponsive to Tylenol without vaginal bleeding or discharge or abdominal pain.  Reports positive fetal movements.  Denies any injury.  Denies urinary complaints.  Physical exam is remarkable for tenderness over the bilateral lower paraspinal muscles.  Abdomen is soft, gravid and nontender.  Will give pain management.  We will check labs to evaluate for preeclampsia.  Pain could be musculoskeletal however considering patient's gestational age  labor is in the differential.  There is no L&D [facility].  We will call the transfer center to transfer patient for L&D evaluation and fetal monitoring. Denies trauma or injury.

## 2023-01-10 NOTE — ED PROVIDER NOTE - OBJECTIVE STATEMENT
23-year-old female who is 30 weeks and 3 days pregnant presenting with bilateral hip pain and lower back pain that started for the last 3 days initially was intermittent now worsening.  Denies vaginal bleeding or leakage of vaginal fluid.  Denies nausea or vomiting.  States her OB is Dr. Maksim Avina at [facility].  States she had preeclampsia with her first pregnancy and had an emergency  at 37 weeks.  States with this pregnancy she is only taking aspirin.  She states she has not called her OB since the onset of the back pain.  She has been taking Tylenol over-the-counter without relief. Patient reports positive fetal movements.  Denies urinary complaints.

## 2023-01-10 NOTE — OB PROVIDER TRIAGE NOTE - NSHPPHYSICALEXAM_GEN_ALL_CORE
T(C): 36.8 (01-10-23 @ 07:55), Max: 36.9 (01-10-23 @ 04:31)  HR: 83 (01-10-23 @ 07:55) (83 - 108)  BP: 103/57 (01-10-23 @ 07:55) (103/57 - 113/66)  RR: 18 (01-10-23 @ 07:55) (16 - 18)  SpO2: 99% (01-10-23 @ 04:31) (99% - 99%)    Gen: NAD, well-appearing  Heart: S1 S2, RRR  Lungs: CTAB  Abd: soft, gravid  Ext: non-edematous, non-tender     SVE: 0/0/-3 @0825  SSE: cervix visualized, closed and without any signs of bleeding or drainage, no pooling   FHT: 145/mod/+accels/-decels  [place]: q2-6min  Bedside sono: breech, posterior placenta

## 2023-01-10 NOTE — ED PROVIDER NOTE - CARE PLAN
Principal Discharge DX:	Back pain  Secondary Diagnosis:	Presents with pregnancy in third trimester   1

## 2023-01-10 NOTE — OB PROVIDER TRIAGE NOTE - HISTORY OF PRESENT ILLNESS
DIMITRIOS WELCH is a 23y  at 30w4d weeks GA who presents to L&D, transferred from [place] for back pain. She states that she had occasional Diomedes Goldsmith like contractions starting Saturday AM. Overnight it became constant back pain. She went to [place] where she r/c 1L IVF bolus, Tylenol 1000mg, and Zofran 8mg, then was transferred here. She is unsure if these are contractions. She denies vaginal bleeding, leakage of fluid. She endorses good fetal movement.   She denies headaches, visual disturbances, RUQ pain, epigastric pain and new-onset edema.   She denies any dysuria, hematuria.    Pregnancy course is significant for:  History of PEC in previous pregnancy  Asthma  Fibroid uterus  Hyperemesis    POB: pCS (2020) 38w, PEC  PGYN: -fibroids/-cysts, denies STD hx, hx LSIL on Pap  PMH: Kidney stones  PSH: C/S  SH: Denies tobacco use, EtOH use and illicit drug use during the pregnancy; Feels safe at home  Meds: Doxylamine-pyridoxine, ASA, Zofran  All: NKDA   DIMITRIOS WELCH is a 23y  at 30w4d weeks GA who presents to L&D, transferred from [place] for back pain. She states that she had occasional Diomedes Goldsmith like contractions starting Saturday AM. Overnight it became constant back pain. She went to [place] where she r/c 1L IVF bolus, Tylenol 1000mg, and Zofran 8mg, then was transferred here. She is unsure if these are contractions. She denies vaginal bleeding, leakage of fluid. She endorses good fetal movement.   She denies headaches, visual disturbances, RUQ pain, epigastric pain and new-onset edema.   She denies any dysuria, hematuria.    Pregnancy course is significant for:  History of PEC in previous pregnancy  Asthma  Fibroid uterus  Hyperemesis    POB: pCS (2020) 38w, PEC  PGYN: -fibroids/-cysts, denies STD hx, hx LSIL on Pap  PMH: Kidney stones  PSH: C/S  SH: Denies tobacco use, EtOH use and illicit drug use during the pregnancy; Feels safe at home  Meds: Doxylamine-pyridoxine, ASA, Zofran  All: oxycodone, morphine

## 2023-01-10 NOTE — OB RN TRIAGE NOTE - FALL HARM RISK - RISK INTERVENTIONS

## 2023-01-10 NOTE — ED PROVIDER NOTE - NSICDXPASTMEDICALHX_GEN_ALL_CORE_FT
PAST MEDICAL HISTORY:  Anemia of chronic disease     Asthma     Fibroids     Kidney stones     Migraines     Preeclampsia

## 2023-01-11 LAB
C TRACH RRNA SPEC QL NAA+PROBE: SIGNIFICANT CHANGE UP
C TRACH+GC RRNA SPEC QL PROBE: SIGNIFICANT CHANGE UP
N GONORRHOEA RRNA SPEC QL NAA+PROBE: SIGNIFICANT CHANGE UP
T VAGINALIS RRNA SPEC QL NAA+PROBE: SIGNIFICANT CHANGE UP

## 2023-01-12 LAB
CULTURE RESULTS: SIGNIFICANT CHANGE UP
SPECIMEN SOURCE: SIGNIFICANT CHANGE UP

## 2023-01-17 ENCOUNTER — INPATIENT (INPATIENT)
Facility: HOSPITAL | Age: 23
LOS: 2 days | Discharge: HOME | DRG: 832 | End: 2023-01-20
Attending: OBSTETRICS & GYNECOLOGY | Admitting: OBSTETRICS & GYNECOLOGY
Payer: MEDICAID

## 2023-01-17 VITALS
RESPIRATION RATE: 17 BRPM | DIASTOLIC BLOOD PRESSURE: 66 MMHG | SYSTOLIC BLOOD PRESSURE: 119 MMHG | HEART RATE: 96 BPM | TEMPERATURE: 98 F

## 2023-01-17 DIAGNOSIS — Z87.59 PERSONAL HISTORY OF OTHER COMPLICATIONS OF PREGNANCY, CHILDBIRTH AND THE PUERPERIUM: ICD-10-CM

## 2023-01-17 DIAGNOSIS — Z98.891 HISTORY OF UTERINE SCAR FROM PREVIOUS SURGERY: Chronic | ICD-10-CM

## 2023-01-17 DIAGNOSIS — B95.1 STREPTOCOCCUS, GROUP B, AS THE CAUSE OF DISEASES CLASSIFIED ELSEWHERE: ICD-10-CM

## 2023-01-17 DIAGNOSIS — Z34.91 ENCOUNTER FOR SUPERVISION OF NORMAL PREGNANCY, UNSPECIFIED, FIRST TRIMESTER: ICD-10-CM

## 2023-01-17 DIAGNOSIS — Z29.9 ENCOUNTER FOR PROPHYLACTIC MEASURES, UNSPECIFIED: ICD-10-CM

## 2023-01-17 DIAGNOSIS — O47.00 FALSE LABOR BEFORE 37 COMPLETED WEEKS OF GESTATION, UNSPECIFIED TRIMESTER: ICD-10-CM

## 2023-01-17 DIAGNOSIS — O34.219 MATERNAL CARE FOR UNSPECIFIED TYPE SCAR FROM PREVIOUS CESAREAN DELIVERY: ICD-10-CM

## 2023-01-17 DIAGNOSIS — E66.3 OVERWEIGHT: ICD-10-CM

## 2023-01-17 DIAGNOSIS — O99.013 ANEMIA COMPLICATING PREGNANCY, THIRD TRIMESTER: ICD-10-CM

## 2023-01-17 DIAGNOSIS — O47.03 FALSE LABOR BEFORE 37 COMPLETED WEEKS OF GESTATION, THIRD TRIMESTER: ICD-10-CM

## 2023-01-17 DIAGNOSIS — O21.0 MILD HYPEREMESIS GRAVIDARUM: Chronic | ICD-10-CM

## 2023-01-17 DIAGNOSIS — Z3A.31 31 WEEKS GESTATION OF PREGNANCY: ICD-10-CM

## 2023-01-17 LAB
ACANTHOCYTES BLD QL SMEAR: SLIGHT — SIGNIFICANT CHANGE UP
ALBUMIN SERPL ELPH-MCNC: 3.4 G/DL — SIGNIFICANT CHANGE UP (ref 3.3–5.2)
ALP SERPL-CCNC: 67 U/L — SIGNIFICANT CHANGE UP (ref 40–120)
ALT FLD-CCNC: 8 U/L — SIGNIFICANT CHANGE UP
AMPHET UR-MCNC: NEGATIVE — SIGNIFICANT CHANGE UP
ANION GAP SERPL CALC-SCNC: 9 MMOL/L — SIGNIFICANT CHANGE UP (ref 5–17)
ANISOCYTOSIS BLD QL: SIGNIFICANT CHANGE UP
APPEARANCE UR: ABNORMAL
APTT BLD: 26.5 SEC — LOW (ref 27.5–35.5)
APTT BLD: 29.7 SEC — SIGNIFICANT CHANGE UP (ref 27.5–35.5)
AST SERPL-CCNC: 19 U/L — SIGNIFICANT CHANGE UP
BACTERIA # UR AUTO: ABNORMAL
BARBITURATES UR SCN-MCNC: NEGATIVE — SIGNIFICANT CHANGE UP
BASOPHILS # BLD AUTO: 0.08 K/UL — SIGNIFICANT CHANGE UP (ref 0–0.2)
BASOPHILS NFR BLD AUTO: 0.8 % — SIGNIFICANT CHANGE UP (ref 0–2)
BENZODIAZ UR-MCNC: NEGATIVE — SIGNIFICANT CHANGE UP
BILIRUB SERPL-MCNC: 0.4 MG/DL — SIGNIFICANT CHANGE UP (ref 0.4–2)
BILIRUB UR-MCNC: NEGATIVE — SIGNIFICANT CHANGE UP
BLD GP AB SCN SERPL QL: SIGNIFICANT CHANGE UP
BUN SERPL-MCNC: 8.7 MG/DL — SIGNIFICANT CHANGE UP (ref 8–20)
BURR CELLS BLD QL SMEAR: PRESENT — SIGNIFICANT CHANGE UP
CALCIUM SERPL-MCNC: 8.7 MG/DL — SIGNIFICANT CHANGE UP (ref 8.4–10.5)
CHLORIDE SERPL-SCNC: 102 MMOL/L — SIGNIFICANT CHANGE UP (ref 96–108)
CO2 SERPL-SCNC: 23 MMOL/L — SIGNIFICANT CHANGE UP (ref 22–29)
COCAINE METAB.OTHER UR-MCNC: NEGATIVE — SIGNIFICANT CHANGE UP
COLOR SPEC: YELLOW — SIGNIFICANT CHANGE UP
COVID-19 SPIKE DOMAIN AB INTERP: POSITIVE
COVID-19 SPIKE DOMAIN ANTIBODY RESULT: >250 U/ML — HIGH
CREAT ?TM UR-MCNC: 31 MG/DL — SIGNIFICANT CHANGE UP
CREAT SERPL-MCNC: 0.33 MG/DL — LOW (ref 0.5–1.3)
DACRYOCYTES BLD QL SMEAR: SLIGHT — SIGNIFICANT CHANGE UP
DIFF PNL FLD: NEGATIVE — SIGNIFICANT CHANGE UP
EGFR: 147 ML/MIN/1.73M2 — SIGNIFICANT CHANGE UP
EOSINOPHIL # BLD AUTO: 0.1 K/UL — SIGNIFICANT CHANGE UP (ref 0–0.5)
EOSINOPHIL NFR BLD AUTO: 1 % — SIGNIFICANT CHANGE UP (ref 0–6)
EPI CELLS # UR: SIGNIFICANT CHANGE UP
FIBRINOGEN PPP-MCNC: 637 MG/DL — HIGH (ref 330–520)
FIBRINOGEN PPP-MCNC: 675 MG/DL — HIGH (ref 330–520)
FIBRONECTIN FETAL SPEC QL: NEGATIVE — SIGNIFICANT CHANGE UP
GLUCOSE BLDC GLUCOMTR-MCNC: 137 MG/DL — HIGH (ref 70–99)
GLUCOSE SERPL-MCNC: 73 MG/DL — SIGNIFICANT CHANGE UP (ref 70–99)
GLUCOSE UR QL: NEGATIVE MG/DL — SIGNIFICANT CHANGE UP
HCT VFR BLD CALC: 25.5 % — LOW (ref 34.5–45)
HCT VFR BLD CALC: 29.6 % — LOW (ref 34.5–45)
HGB BLD-MCNC: 7.2 G/DL — LOW (ref 11.5–15.5)
HGB BLD-MCNC: 8.2 G/DL — LOW (ref 11.5–15.5)
HYPOCHROMIA BLD QL: SLIGHT — SIGNIFICANT CHANGE UP
IMM GRANULOCYTES NFR BLD AUTO: 0.8 % — SIGNIFICANT CHANGE UP (ref 0–0.9)
INR BLD: 1.09 RATIO — SIGNIFICANT CHANGE UP (ref 0.88–1.16)
INR BLD: 1.11 RATIO — SIGNIFICANT CHANGE UP (ref 0.88–1.16)
KETONES UR-MCNC: NEGATIVE — SIGNIFICANT CHANGE UP
LEUKOCYTE ESTERASE UR-ACNC: ABNORMAL
LYMPHOCYTES # BLD AUTO: 2.15 K/UL — SIGNIFICANT CHANGE UP (ref 1–3.3)
LYMPHOCYTES # BLD AUTO: 22.2 % — SIGNIFICANT CHANGE UP (ref 13–44)
MANUAL SMEAR VERIFICATION: SIGNIFICANT CHANGE UP
MCHC RBC-ENTMCNC: 18 PG — LOW (ref 27–34)
MCHC RBC-ENTMCNC: 18.1 PG — LOW (ref 27–34)
MCHC RBC-ENTMCNC: 27.7 GM/DL — LOW (ref 32–36)
MCHC RBC-ENTMCNC: 28.2 GM/DL — LOW (ref 32–36)
MCV RBC AUTO: 63.6 FL — LOW (ref 80–100)
MCV RBC AUTO: 65.2 FL — LOW (ref 80–100)
METHADONE UR-MCNC: NEGATIVE — SIGNIFICANT CHANGE UP
MICROCYTES BLD QL: SIGNIFICANT CHANGE UP
MONOCYTES # BLD AUTO: 0.65 K/UL — SIGNIFICANT CHANGE UP (ref 0–0.9)
MONOCYTES NFR BLD AUTO: 6.7 % — SIGNIFICANT CHANGE UP (ref 2–14)
NEUTROPHILS # BLD AUTO: 6.63 K/UL — SIGNIFICANT CHANGE UP (ref 1.8–7.4)
NEUTROPHILS NFR BLD AUTO: 68.5 % — SIGNIFICANT CHANGE UP (ref 43–77)
NITRITE UR-MCNC: NEGATIVE — SIGNIFICANT CHANGE UP
OPIATES UR-MCNC: NEGATIVE — SIGNIFICANT CHANGE UP
OVALOCYTES BLD QL SMEAR: SLIGHT — SIGNIFICANT CHANGE UP
PCP SPEC-MCNC: SIGNIFICANT CHANGE UP
PCP UR-MCNC: NEGATIVE — SIGNIFICANT CHANGE UP
PH UR: 6.5 — SIGNIFICANT CHANGE UP (ref 5–8)
PLAT MORPH BLD: NORMAL — SIGNIFICANT CHANGE UP
PLATELET # BLD AUTO: 474 K/UL — HIGH (ref 150–400)
PLATELET # BLD AUTO: 475 K/UL — HIGH (ref 150–400)
POIKILOCYTOSIS BLD QL AUTO: SLIGHT — SIGNIFICANT CHANGE UP
POLYCHROMASIA BLD QL SMEAR: SLIGHT — SIGNIFICANT CHANGE UP
POTASSIUM SERPL-MCNC: 4.1 MMOL/L — SIGNIFICANT CHANGE UP (ref 3.5–5.3)
POTASSIUM SERPL-SCNC: 4.1 MMOL/L — SIGNIFICANT CHANGE UP (ref 3.5–5.3)
PROT ?TM UR-MCNC: 7 MG/DL — SIGNIFICANT CHANGE UP (ref 0–12)
PROT SERPL-MCNC: 7.2 G/DL — SIGNIFICANT CHANGE UP (ref 6.6–8.7)
PROT UR-MCNC: 15
PROT/CREAT UR-RTO: 0.2 RATIO — SIGNIFICANT CHANGE UP
PROTHROM AB SERPL-ACNC: 12.7 SEC — SIGNIFICANT CHANGE UP (ref 10.5–13.4)
PROTHROM AB SERPL-ACNC: 12.9 SEC — SIGNIFICANT CHANGE UP (ref 10.5–13.4)
RBC # BLD: 4.01 M/UL — SIGNIFICANT CHANGE UP (ref 3.8–5.2)
RBC # BLD: 4.54 M/UL — SIGNIFICANT CHANGE UP (ref 3.8–5.2)
RBC # FLD: 24.1 % — HIGH (ref 10.3–14.5)
RBC # FLD: 24.5 % — HIGH (ref 10.3–14.5)
RBC BLD AUTO: ABNORMAL
RBC CASTS # UR COMP ASSIST: NEGATIVE /HPF — SIGNIFICANT CHANGE UP (ref 0–4)
SARS-COV-2 IGG+IGM SERPL QL IA: >250 U/ML — HIGH
SARS-COV-2 IGG+IGM SERPL QL IA: POSITIVE
SARS-COV-2 RNA SPEC QL NAA+PROBE: SIGNIFICANT CHANGE UP
SCHISTOCYTES BLD QL AUTO: SLIGHT — SIGNIFICANT CHANGE UP
SODIUM SERPL-SCNC: 134 MMOL/L — LOW (ref 135–145)
SP GR SPEC: 1.01 — SIGNIFICANT CHANGE UP (ref 1.01–1.02)
THC UR QL: NEGATIVE — SIGNIFICANT CHANGE UP
UROBILINOGEN FLD QL: 4 MG/DL
WBC # BLD: 12.32 K/UL — HIGH (ref 3.8–10.5)
WBC # BLD: 9.69 K/UL — SIGNIFICANT CHANGE UP (ref 3.8–10.5)
WBC # FLD AUTO: 12.32 K/UL — HIGH (ref 3.8–10.5)
WBC # FLD AUTO: 9.69 K/UL — SIGNIFICANT CHANGE UP (ref 3.8–10.5)
WBC UR QL: SIGNIFICANT CHANGE UP /HPF (ref 0–5)

## 2023-01-17 PROCEDURE — 93010 ELECTROCARDIOGRAM REPORT: CPT

## 2023-01-17 PROCEDURE — 99233 SBSQ HOSP IP/OBS HIGH 50: CPT | Mod: GC

## 2023-01-17 PROCEDURE — 99222 1ST HOSP IP/OBS MODERATE 55: CPT | Mod: GC

## 2023-01-17 RX ORDER — ONDANSETRON 8 MG/1
4 TABLET, FILM COATED ORAL ONCE
Refills: 0 | Status: COMPLETED | OUTPATIENT
Start: 2023-01-17 | End: 2023-01-17

## 2023-01-17 RX ORDER — INDOMETHACIN 50 MG
50 CAPSULE ORAL ONCE
Refills: 0 | Status: COMPLETED | OUTPATIENT
Start: 2023-01-17 | End: 2023-01-17

## 2023-01-17 RX ORDER — INDOMETHACIN 50 MG
25 CAPSULE ORAL EVERY 6 HOURS
Refills: 0 | Status: COMPLETED | OUTPATIENT
Start: 2023-01-17 | End: 2023-01-18

## 2023-01-17 RX ORDER — IRON SUCROSE 20 MG/ML
200 INJECTION, SOLUTION INTRAVENOUS ONCE
Refills: 0 | Status: COMPLETED | OUTPATIENT
Start: 2023-01-17 | End: 2023-01-17

## 2023-01-17 RX ORDER — FERROUS SULFATE 325(65) MG
325 TABLET ORAL DAILY
Refills: 0 | Status: DISCONTINUED | OUTPATIENT
Start: 2023-01-17 | End: 2023-01-18

## 2023-01-17 RX ORDER — FOLIC ACID 0.8 MG
1 TABLET ORAL DAILY
Refills: 0 | Status: DISCONTINUED | OUTPATIENT
Start: 2023-01-17 | End: 2023-01-18

## 2023-01-17 RX ORDER — SODIUM CHLORIDE 9 MG/ML
1000 INJECTION, SOLUTION INTRAVENOUS ONCE
Refills: 0 | Status: COMPLETED | OUTPATIENT
Start: 2023-01-17 | End: 2023-01-17

## 2023-01-17 RX ORDER — ACETAMINOPHEN 500 MG
975 TABLET ORAL ONCE
Refills: 0 | Status: COMPLETED | OUTPATIENT
Start: 2023-01-17 | End: 2023-01-17

## 2023-01-17 RX ORDER — SODIUM CHLORIDE 9 MG/ML
1000 INJECTION, SOLUTION INTRAVENOUS
Refills: 0 | Status: DISCONTINUED | OUTPATIENT
Start: 2023-01-17 | End: 2023-01-20

## 2023-01-17 RX ADMIN — ONDANSETRON 4 MILLIGRAM(S): 8 TABLET, FILM COATED ORAL at 17:00

## 2023-01-17 RX ADMIN — IRON SUCROSE 110 MILLIGRAM(S): 20 INJECTION, SOLUTION INTRAVENOUS at 13:01

## 2023-01-17 RX ADMIN — Medication 50 MILLIGRAM(S): at 10:25

## 2023-01-17 RX ADMIN — Medication 325 MILLIGRAM(S): at 16:10

## 2023-01-17 RX ADMIN — Medication 1 TABLET(S): at 15:50

## 2023-01-17 RX ADMIN — Medication 25 MILLIGRAM(S): at 21:00

## 2023-01-17 RX ADMIN — Medication 975 MILLIGRAM(S): at 08:24

## 2023-01-17 RX ADMIN — Medication 25 MILLIGRAM(S): at 15:51

## 2023-01-17 RX ADMIN — SODIUM CHLORIDE 125 MILLILITER(S): 9 INJECTION, SOLUTION INTRAVENOUS at 09:00

## 2023-01-17 RX ADMIN — SODIUM CHLORIDE 75 MILLILITER(S): 9 INJECTION, SOLUTION INTRAVENOUS at 21:59

## 2023-01-17 RX ADMIN — SODIUM CHLORIDE 125 MILLILITER(S): 9 INJECTION, SOLUTION INTRAVENOUS at 13:01

## 2023-01-17 RX ADMIN — Medication 1 MILLIGRAM(S): at 16:10

## 2023-01-17 RX ADMIN — SODIUM CHLORIDE 75 MILLILITER(S): 9 INJECTION, SOLUTION INTRAVENOUS at 14:12

## 2023-01-17 RX ADMIN — Medication 25 MILLIGRAM(S): at 22:00

## 2023-01-17 RX ADMIN — Medication 25 MILLIGRAM(S): at 16:30

## 2023-01-17 RX ADMIN — SODIUM CHLORIDE 1000 MILLILITER(S): 9 INJECTION, SOLUTION INTRAVENOUS at 08:00

## 2023-01-17 RX ADMIN — Medication 12 MILLIGRAM(S): at 10:23

## 2023-01-17 RX ADMIN — Medication 50 MILLIGRAM(S): at 11:06

## 2023-01-17 RX ADMIN — Medication 975 MILLIGRAM(S): at 07:24

## 2023-01-17 NOTE — OB PROVIDER H&P - NSHPPHYSICALEXAM_GEN_ALL_CORE
Vital Signs Last 24 Hrs  T(C): 36.7 (17 Jan 2023 07:57), Max: 36.7 (17 Jan 2023 04:57)  T(F): 98.06 (17 Jan 2023 07:57), Max: 98.1 (17 Jan 2023 04:57)  HR: 89 (17 Jan 2023 10:00) (81 - 96)  BP: 123/76 (17 Jan 2023 10:00) (113/69 - 123/76)  RR: 17 (17 Jan 2023 04:57) (17 - 17)    General: Alert and oriented x3, no acute distress  Cardiovascular: regular rate and rhythm, no murmurs, rubs or gallops appreciated on exam  Respiratory: clear to auscultation bilaterally  Abdominal: gravid uterus, non-tender to palpation  Pelvic: 0/0/-3  Extremities: no redness, tenderness or swelling in lower extremities bilaterally     FHT: baseline 150bpm, moderate variability, +accels, -deccels  [place]: regular contractions q1-3 minutes  Ultrasound: footling breech presentation, fundal placenta

## 2023-01-17 NOTE — CONSULT NOTE ADULT - PROBLEM SELECTOR RECOMMENDATION 6
- GBS positive on vaginal culture performed 1/10/23  - Prophylactic antibiotics indicated at time of delivery

## 2023-01-17 NOTE — CONSULT NOTE ADULT - PROBLEM SELECTOR RECOMMENDATION 5
- Patient is at increased risk of gestational diabetes, hypertensive disorders of pregnancy, and erickson-operative complications

## 2023-01-17 NOTE — OB RN PATIENT PROFILE - NS_PRENATALHARD_OBGYN_ALL_OB
Dermal Autograft Text: The defect edges were debeveled with a #15 scalpel blade.  Given the location of the defect, shape of the defect and the proximity to free margins a dermal autograft was deemed most appropriate.  Using a sterile surgical marker, the primary defect shape was transferred to the donor site. The area thus outlined was incised deep to adipose tissue with a #15 scalpel blade.  The harvested graft was then trimmed of adipose and epidermal tissue until only dermis was left.  The skin graft was then placed in the primary defect and oriented appropriately. Available

## 2023-01-17 NOTE — OB RN TRIAGE NOTE - FALL HARM RISK - UNIVERSAL INTERVENTIONS
Bed in lowest position, wheels locked, appropriate side rails in place/Call bell, personal items and telephone in reach/Instruct patient to call for assistance before getting out of bed or chair/Non-slip footwear when patient is out of bed/[place] to call system/Physically safe environment - no spills, clutter or unnecessary equipment/Purposeful Proactive Rounding/Room/bathroom lighting operational, light cord in reach

## 2023-01-17 NOTE — CONSULT NOTE ADULT - ATTENDING COMMENTS
Pregnancy complicated by  uterine contractions, prior  delivery, anemia and other comorbidities. Steroids for fetal lung maturity and a short course of tocolysis with indomethacin for 48 hours.

## 2023-01-17 NOTE — OB PROVIDER H&P - HISTORY OF PRESENT ILLNESS
DIMITRIOS WELCH is a 23y  at 31w4d weeks GA who presents to L&D, for persistent back pain and hip pain overnight. She states that she is unsure if these are contractions. She endorses mild vaginal spotting starting yesterday. She denies any vaginal exams, sexual intercourse within the past 48hr. She denies leakage of fluid. She endorses good fetal movement.   She denies headaches, visual disturbances, RUQ pain, epigastric pain and new-onset edema.   She denies any dysuria, hematuria, cough, fevers, chills, rhinorrhea.    Pregnancy course is significant for:  History of PEC in previous pregnancy  Asthma  Fibroid uterus  Hyperemesis    POB:   G1: pCS (2020) 38w secondary to , admitted initially with ctx PEC  G2:  top  PGYN: -fibroids/-cysts, denies STD hx, hx LSIL on Pap  PMH: Kidney stones  PSH: C/S  SH: Denies tobacco use, EtOH use and illicit drug use during the pregnancy; Feels safe at home  Meds: Doxylamine-pyridoxine, ASA, Zofran  All: oxycodone, morphine

## 2023-01-17 NOTE — OB PROVIDER H&P - NSLOWPPHRISK_OBGYN_A_OB
Jo[place] Pregnancy/No known bleeding disorder/No history of postpartum hemorrhage/No other PPH risks indicated

## 2023-01-17 NOTE — CONSULT NOTE ADULT - PROBLEM SELECTOR RECOMMENDATION 9
- H/o preeclampsia in previous pregnancy  - Normotensive throughout this pregnancy  - Continue to monitor while inpatient - dated by LMP consistent with 1st trimester sonogram  - Due for fetal growth ultrasound, will perform tomorrow  - BPP 8/8  - Continuous uterine and fetal monitoring  - Continue prenatal vitamins  - Betamethasone for ACS  - Delivery is not currently indicated, no need for MgSO4 a this time. If delivery becomes necessary, will give MgSo4 for fetal neuroprotection  - Inpatient care for now - H/o preeclampsia in previous pregnancy on daily baby aspirin  - Normotensive throughout this pregnancy  - Continue to monitor while inpatient

## 2023-01-17 NOTE — OB RN PATIENT PROFILE - FUNCTIONAL ASSESSMENT - BASIC MOBILITY 6.
4-calculated by average/Not able to assess (calculate score using [facility] averaging method)

## 2023-01-17 NOTE — OB PROVIDER H&P - ATTENDING COMMENTS
23y  at 31w4d weeks GA who presents to L&D, for persistent back pain, vaginal spotting and severe anemia of pregnancy, admitted for r/o abruption, steroids and IV iron infusion. Reassuring FHT, appreciate MFM input.

## 2023-01-17 NOTE — CONSULT NOTE ADULT - PROBLEM SELECTOR RECOMMENDATION 10
- SCDs while in bed  - If still admitted on hospital day 3, will begin chemoprophylaxis with subq heparin  - BMZ for ACS

## 2023-01-17 NOTE — OB RN PATIENT PROFILE - PRO HIV INFANT
Patient denied any COVID-19 symptoms or possible exposure that would require a pre-procedural COVID-19 test for the Cath Lab procedure scheduled on 7/9/21.
negative

## 2023-01-17 NOTE — CONSULT NOTE ADULT - PROBLEM SELECTOR RECOMMENDATION 2
- Patient reports painful uterine contractions  - Cervical exam unchanged over 3 hours  - Denies other labor symptoms  - Denies incisional pain  - FFN pending  - UA without evidence of infection  - Vaginal cultures sent  - As she is <32 weeks, will give indomethacin for tocolysis  - Repeat cervical exam if contractions worsen or become more frequent

## 2023-01-17 NOTE — CONSULT NOTE ADULT - PROBLEM SELECTOR RECOMMENDATION 4
- Persistent anemia in pregnancy  - Hgb 8.2 today from 7.4 last week and 6.9 in November  - Patient asymptomatic  - Hemoglobin electrophoresis WNL  - Patient on PO iron at home  - Will give IV iron infusion today

## 2023-01-17 NOTE — OB PROVIDER H&P - ASSESSMENT
Patient is a 23y  at 31w4d who presented to L&D for abdominal pain.    Plan    - Admission labs: CBC, type and screen, ABO confirmation, urinalysis, RPR, covid-19 pcr stat, FFN, bacterial vaginosis panel  - IV fluids 125 ml/hr  - Pain management: tylenol 975mg prn  - Continuous FHT and Tocometry  - GBS positive, no prophylaxis required at this time  - [facility] consult    d/w Dr. Campbell

## 2023-01-17 NOTE — CONSULT NOTE ADULT - ASSESSMENT
23year old  (LMP 6/10/22 EDC 3/17/23) at 31 4/7 weeks Gestational Age admitted for  contractions 23year old  (LMP 6/10/22 EDC 3/17/23) at 31 4/7 weeks Gestational Age. She has  uterine contractions

## 2023-01-17 NOTE — OB PROVIDER TRIAGE NOTE - HISTORY OF PRESENT ILLNESS
DIMITRIOS WELCH is a 23y  at 31w4d weeks GA who presents to L&D, for persistent back pain and hip pain overnight. She states that she is unsure if these are contractions. She endorses mild vaginal spotting starting yesterday. She denies any vaginal exams, sexual intercourse within the past 48hr. She denies leakage of fluid. She endorses good fetal movement.   She denies headaches, visual disturbances, RUQ pain, epigastric pain and new-onset edema.   She denies any dysuria, hematuria, cough, fevers, chills, rhinorrhea.    Pregnancy course is significant for:  History of PEC in previous pregnancy  Asthma  Fibroid uterus  Hyperemesis    POB: pCS (2020) 38w secondary to , admitted initially with ctx PEC  PGYN: -fibroids/-cysts, denies STD hx, hx LSIL on Pap  PMH: Kidney stones  PSH: C/S  SH: Denies tobacco use, EtOH use and illicit drug use during the pregnancy; Feels safe at home  Meds: Doxylamine-pyridoxine, ASA, Zofran  All: oxycodone, morphine

## 2023-01-17 NOTE — CONSULT NOTE ADULT - SUBJECTIVE AND OBJECTIVE BOX
DIMITRIOS WELCH  A 23year old  Last Menstrual Period 6/10/22   EDC 3/17/23 at 31 4/7 weeks Gestational Age presents to L&D, for persistent back pain and hip pain overnight. She states that she is unsure if these are contractions. She endorses mild vaginal spotting starting yesterday. She denies any vaginal exams, sexual intercourse within the past 48hr. She denies leakage of fluid. She endorses good fetal movement.   She denies headaches, visual disturbances, RUQ pain, epigastric pain and new-onset edema.   She denies any dysuria, hematuria, cough, fevers, chills, rhinorrhea.    Pregnancy course is significant for:  1. History of PEC in previous pregnancy  2. Asthma  3. Fibroid uterus  4. Hyperemesis, now resolved  5. Previous CS x 1  6. Breech presentation    POB:   G1: pCS (2020) 38w secondary to NRFHT, admitted initially with ctx PEC  G2:  top  PGYN: -fibroids/-cysts, denies STD hx, hx LSIL on Pap  PMH: Kidney stones, asthma  PSH: C/S  SH: Denies tobacco use, EtOH use and illicit drug use during the pregnancy; Feels safe at home  Meds: ASA, Zofran  All: oxycodone, morphine          REVIEW OF SYSTEMS:    CONSTITUTIONAL: No weakness, fevers or chills  EYES/ENT: No visual changes;  No vertigo or throat pain   NECK: No pain or stiffness  RESPIRATORY: No cough, wheezing, hemoptysis; No shortness of breath  CARDIOVASCULAR: No chest pain or palpitations  GASTROINTESTINAL: No abdominal or epigastric pain. No nausea, vomiting, or hematemesis; No diarrhea or constipation. No melena or hematochezia.  GENITOURINARY: No dysuria, frequency or hematuria  NEUROLOGICAL: No numbness or weakness  SKIN: No itching, burning, rashes, or lesions   All other review of systems is negative unless indicated above.    Vital Signs:  Vital Signs Last 24 Hrs  T(C): 36.7 (2023 09:54), Max: 36.7 (2023 04:57)  T(F): 98.1 (2023 09:54), Max: 98.1 (2023 04:57)  HR: 89 (2023 10:00) (81 - 96)  BP: 123/76 (2023 10:00) (113/69 - 123/76)  RR: 19 (2023 09:54) (17 - 19)    Height (cm): 162.6 (23 @ 10:09)  Weight (kg): 77.1 (23 @ 10:09)  BMI (kg/m2): 29.2 (23 @ 10:09)  BSA (m2): 1.83 (23 @ 10:09)    Physical Exam:  General: Adult female in NAD  Head/Neck: No neck masses, no lymphadenopathy  CVS: RRR, +S1/S2, no murmurs  Lungs: CTAB, no wheezing, rhonchi or rales  Abdomen: soft, non-tender, gravid uterus  Pelvic: 0/0/-3  Ext: No cyanosis, edema or calf tenderness  Skin: No rashes or lesions on exposed skin  Neuro: Normal DTRs, grossly intact    FHT: baseline 150bpm, moderate variability, +accels, -deccels  [place]: regular contractions q1-3 minutes  Ultrasound: footling breech presentation, fundal placenta, COOPER 14.6, BPP 8/8    Labs:                          8.2    9.69  )-----------( 474      ( 2023 07:47 )             29.6         134<L>  |  102  |  8.7  ----------------------------<  73  4.1   |  23.0  |  0.33<L>    Ca    8.7      2023 07:47    TPro  7.2  /  Alb  3.4  /  TBili  0.4  /  DBili  x   /  AST  19  /  ALT  8   /  AlkPhos  67      PT/INR - ( 2023 07:47 )   PT: 12.7 sec;   INR: 1.09 ratio         PTT - ( 2023 07:47 )  PTT:26.5 sec          23 @ 07:01  -  23 @ 10:44  --------------------------------------------------------  IN: 1000 mL / OUT: 1 mL / NET: 999 mL        Radiology:    MEDICATIONS  (STANDING):  betamethasone Injectable 12 milliGRAM(s) IntraMuscular every 24 hours  ferrous    sulfate 325 milliGRAM(s) Oral daily  folic acid 1 milliGRAM(s) Oral daily  indomethacin 25 milliGRAM(s) Oral every 6 hours  iron sucrose IVPB 200 milliGRAM(s) IV Intermittent once  lactated ringers. 1000 milliLiter(s) (125 mL/Hr) IV Continuous <Continuous>  prenatal multivitamin 1 Tablet(s) Oral daily    MEDICATIONS  (PRN):   DIMITRIOS WELCH  A 23 year old  Last Menstrual Period 6/10/22  EDC 3/17/23 at 31 4/7 weeks Gestational Age presents to L&D, for persistent back pain and hip pain overnight. She states that she is unsure if these are contractions. She endorses mild vaginal spotting starting yesterday. She denies any vaginal exams, sexual intercourse within the past 48hr. She denies leakage of fluid. She endorses good fetal movement. She denies headaches, visual disturbances, RUQ pain, epigastric pain and new-onset edema.  She denies any dysuria, hematuria, cough, fevers, chills, rhinorrhea.    Pregnancy course is significant for:  1. History of PEC in previous pregnancy  2. Asthma  3. Fibroid uterus  4. Hyperemesis, now resolved  5. Previous CS x 1  6. Breech presentation    POB:   G1: pCS (2020) 38w secondary to NRFHT, admitted initially with ctx PEC  G2:  top  PGYN: -fibroids/-cysts, denies STD hx, hx LSIL on Pap  PMH: Kidney stones, asthma  PSH: C/S  SH: Denies tobacco use, EtOH use and illicit drug use during the pregnancy; Feels safe at home  Meds: ASA, Zofran  All: oxycodone, morphine    REVIEW OF SYSTEMS:    CONSTITUTIONAL: No weakness, fevers or chills  EYES/ENT: No visual changes;  No vertigo or throat pain   NECK: No pain or stiffness  RESPIRATORY: No cough, wheezing, hemoptysis; No shortness of breath  CARDIOVASCULAR: No chest pain or palpitations  GASTROINTESTINAL: No abdominal or epigastric pain. No nausea, vomiting, or hematemesis; No diarrhea or constipation. No melena or hematochezia.  GENITOURINARY: No dysuria, frequency or hematuria  NEUROLOGICAL: No numbness or weakness  SKIN: No itching, burning, rashes, or lesions   All other review of systems is negative unless indicated above.    Vital Signs:  Vital Signs Last 24 Hrs  T(C): 36.7 (2023 09:54), Max: 36.7 (2023 04:57)  T(F): 98.1 (2023 09:54), Max: 98.1 (2023 04:57)  HR: 89 (2023 10:00) (81 - 96)  BP: 123/76 (2023 10:00) (113/69 - 123/76)  RR: 19 (2023 09:54) (17 - 19)    Height (cm): 162.6 (23 @ 10:09)  Weight (kg): 77.1 (23 @ 10:09)  BMI (kg/m2): 29.2 (23 @ 10:09)  BSA (m2): 1.83 (23 @ 10:09)    Physical Exam:  General: Adult female in NAD  Head/Neck: No neck masses, no lymphadenopathy  CVS: RRR, +S1/S2, no murmurs  Lungs: CTAB, no wheezing, rhonchi or rales  Abdomen: soft, non-tender, gravid uterus  Pelvic: 0/0/-3  Ext: No cyanosis, edema or calf tenderness  Skin: No rashes or lesions on exposed skin  Neuro: Normal DTRs, grossly intact    FHT: baseline 150bpm, moderate variability, +accels, -deccels  [place]: regular contractions q1-3 minutes  Ultrasound: footling breech presentation, fundal placenta, COOPER 14.6, BPP 8/8    Labs:                      8.2    9.69  )-----------( 474      ( 2023 07:47 )             29.6         134<L>  |  102  |  8.7  ----------------------------<  73  4.1   |  23.0  |  0.33<L>    Ca    8.7      2023 07:47    TPro  7.2  /  Alb  3.4  /  TBili  0.4  /  DBili  x   /  AST  19  /  ALT  8   /  AlkPhos  67      PT/INR - ( 2023 07:47 )   PT: 12.7 sec;   INR: 1.09 ratio      PTT - ( 2023 07:47 )  PTT:26.5 sec    23 @ 07:01  -  23 @ 10:44  --------------------------------------------------------  IN: 1000 mL / OUT: 1 mL / NET: 999 mL    MEDICATIONS  (STANDING):  betamethasone Injectable 12 milliGRAM(s) IntraMuscular every 24 hours  ferrous    sulfate 325 milliGRAM(s) Oral daily  folic acid 1 milliGRAM(s) Oral daily  indomethacin 25 milliGRAM(s) Oral every 6 hours  iron sucrose IVPB 200 milliGRAM(s) IV Intermittent once  lactated ringers. 1000 milliLiter(s) (125 mL/Hr) IV Continuous <Continuous>  prenatal multivitamin 1 Tablet(s) Oral daily

## 2023-01-17 NOTE — CONSULT NOTE ADULT - PROBLEM SELECTOR RECOMMENDATION 3
- On bedside sonogram, footling breech presentation noted  - Delivery not currently indicated, but if required, plan for repeat  delivery

## 2023-01-18 LAB
ACANTHOCYTES BLD QL SMEAR: SLIGHT — SIGNIFICANT CHANGE UP
ANISOCYTOSIS BLD QL: SLIGHT — SIGNIFICANT CHANGE UP
APTT BLD: 27.6 SEC — SIGNIFICANT CHANGE UP (ref 27.5–35.5)
BASOPHILS # BLD AUTO: 0 K/UL — SIGNIFICANT CHANGE UP (ref 0–0.2)
BASOPHILS NFR BLD AUTO: 0 % — SIGNIFICANT CHANGE UP (ref 0–2)
BURR CELLS BLD QL SMEAR: PRESENT — SIGNIFICANT CHANGE UP
CANDIDA AB TITR SER: SIGNIFICANT CHANGE UP
DACRYOCYTES BLD QL SMEAR: SLIGHT — SIGNIFICANT CHANGE UP
EOSINOPHIL # BLD AUTO: 0 K/UL — SIGNIFICANT CHANGE UP (ref 0–0.5)
EOSINOPHIL NFR BLD AUTO: 0 % — SIGNIFICANT CHANGE UP (ref 0–6)
FERRITIN SERPL-MCNC: 48 NG/ML — SIGNIFICANT CHANGE UP (ref 15–150)
FIBRINOGEN PPP-MCNC: 644 MG/DL — HIGH (ref 330–520)
G VAGINALIS DNA SPEC QL NAA+PROBE: DETECTED
GIANT PLATELETS BLD QL SMEAR: PRESENT — SIGNIFICANT CHANGE UP
HCT VFR BLD CALC: 25.8 % — LOW (ref 34.5–45)
HGB BLD-MCNC: 7.3 G/DL — LOW (ref 11.5–15.5)
HYPOCHROMIA BLD QL: SLIGHT — SIGNIFICANT CHANGE UP
INR BLD: 1.14 RATIO — SIGNIFICANT CHANGE UP (ref 0.88–1.16)
IRON SATN MFR SERPL: 442 UG/DL — HIGH (ref 37–145)
IRON SATN MFR SERPL: 60 % — HIGH (ref 14–50)
LYMPHOCYTES # BLD AUTO: 0.51 K/UL — LOW (ref 1–3.3)
LYMPHOCYTES # BLD AUTO: 3.5 % — LOW (ref 13–44)
MACROCYTES BLD QL: SLIGHT — SIGNIFICANT CHANGE UP
MANUAL SMEAR VERIFICATION: SIGNIFICANT CHANGE UP
MCHC RBC-ENTMCNC: 18.3 PG — LOW (ref 27–34)
MCHC RBC-ENTMCNC: 28.3 GM/DL — LOW (ref 32–36)
MCV RBC AUTO: 64.8 FL — LOW (ref 80–100)
MICROCYTES BLD QL: SLIGHT — SIGNIFICANT CHANGE UP
MONOCYTES # BLD AUTO: 0.77 K/UL — SIGNIFICANT CHANGE UP (ref 0–0.9)
MONOCYTES NFR BLD AUTO: 5.3 % — SIGNIFICANT CHANGE UP (ref 2–14)
NEUTROPHILS # BLD AUTO: 13.08 K/UL — HIGH (ref 1.8–7.4)
NEUTROPHILS NFR BLD AUTO: 89.4 % — HIGH (ref 43–77)
NEUTS BAND # BLD: 0.9 % — SIGNIFICANT CHANGE UP (ref 0–8)
PLAT MORPH BLD: NORMAL — SIGNIFICANT CHANGE UP
PLATELET # BLD AUTO: 502 K/UL — HIGH (ref 150–400)
POIKILOCYTOSIS BLD QL AUTO: SIGNIFICANT CHANGE UP
POLYCHROMASIA BLD QL SMEAR: SLIGHT — SIGNIFICANT CHANGE UP
PROTHROM AB SERPL-ACNC: 13.2 SEC — SIGNIFICANT CHANGE UP (ref 10.5–13.4)
RBC # BLD: 3.98 M/UL — SIGNIFICANT CHANGE UP (ref 3.8–5.2)
RBC # FLD: 24.1 % — HIGH (ref 10.3–14.5)
RBC BLD AUTO: ABNORMAL
SCHISTOCYTES BLD QL AUTO: SLIGHT — SIGNIFICANT CHANGE UP
T PALLIDUM AB TITR SER: NEGATIVE — SIGNIFICANT CHANGE UP
T VAGINALIS SPEC QL WET PREP: SIGNIFICANT CHANGE UP
TARGETS BLD QL SMEAR: SLIGHT — SIGNIFICANT CHANGE UP
TIBC SERPL-MCNC: 731 UG/DL — HIGH (ref 220–430)
TRANSFERRIN SERPL-MCNC: 511 MG/DL — HIGH (ref 192–382)
VARIANT LYMPHS # BLD: 0.9 % — SIGNIFICANT CHANGE UP (ref 0–6)
WBC # BLD: 14.49 K/UL — HIGH (ref 3.8–10.5)
WBC # FLD AUTO: 14.49 K/UL — HIGH (ref 3.8–10.5)

## 2023-01-18 PROCEDURE — 76820 UMBILICAL ARTERY ECHO: CPT | Mod: 26,59

## 2023-01-18 PROCEDURE — 76816 OB US FOLLOW-UP PER FETUS: CPT | Mod: 26

## 2023-01-18 PROCEDURE — 76819 FETAL BIOPHYS PROFIL W/O NST: CPT | Mod: 26,59

## 2023-01-18 PROCEDURE — 99232 SBSQ HOSP IP/OBS MODERATE 35: CPT | Mod: GC

## 2023-01-18 RX ORDER — METRONIDAZOLE 500 MG
500 TABLET ORAL
Refills: 0 | Status: DISCONTINUED | OUTPATIENT
Start: 2023-01-18 | End: 2023-01-20

## 2023-01-18 RX ORDER — DIPHENHYDRAMINE HCL 50 MG
25 CAPSULE ORAL ONCE
Refills: 0 | Status: COMPLETED | OUTPATIENT
Start: 2023-01-18 | End: 2023-01-18

## 2023-01-18 RX ORDER — ACETAMINOPHEN 500 MG
650 TABLET ORAL EVERY 6 HOURS
Refills: 0 | Status: DISCONTINUED | OUTPATIENT
Start: 2023-01-18 | End: 2023-01-20

## 2023-01-18 RX ORDER — ACETAMINOPHEN 500 MG
975 TABLET ORAL ONCE
Refills: 0 | Status: COMPLETED | OUTPATIENT
Start: 2023-01-18 | End: 2023-01-18

## 2023-01-18 RX ORDER — FERROUS SULFATE 325(65) MG
325 TABLET ORAL DAILY
Refills: 0 | Status: DISCONTINUED | OUTPATIENT
Start: 2023-01-18 | End: 2023-01-20

## 2023-01-18 RX ADMIN — Medication 975 MILLIGRAM(S): at 08:08

## 2023-01-18 RX ADMIN — SODIUM CHLORIDE 75 MILLILITER(S): 9 INJECTION, SOLUTION INTRAVENOUS at 09:10

## 2023-01-18 RX ADMIN — Medication 1 MILLIGRAM(S): at 12:00

## 2023-01-18 RX ADMIN — Medication 25 MILLIGRAM(S): at 11:40

## 2023-01-18 RX ADMIN — Medication 1 TABLET(S): at 11:40

## 2023-01-18 RX ADMIN — Medication 25 MILLIGRAM(S): at 10:10

## 2023-01-18 RX ADMIN — Medication 975 MILLIGRAM(S): at 09:00

## 2023-01-18 RX ADMIN — Medication 25 MILLIGRAM(S): at 09:09

## 2023-01-18 RX ADMIN — Medication 500 MILLIGRAM(S): at 17:28

## 2023-01-18 RX ADMIN — Medication 325 MILLIGRAM(S): at 12:00

## 2023-01-18 RX ADMIN — Medication 25 MILLIGRAM(S): at 03:00

## 2023-01-18 RX ADMIN — Medication 650 MILLIGRAM(S): at 21:09

## 2023-01-18 RX ADMIN — Medication 12 MILLIGRAM(S): at 11:40

## 2023-01-18 NOTE — PROGRESS NOTE ADULT - PROBLEM SELECTOR PLAN 1
- Dated by LMP consistent with first trimester sonogram   - Due for MFM sonogram, will perform growth and BPP today  - continuous uterine and fetal monitoring, has been mostly reactive with isolated spontaneous deceleration that recovered with repositioning   - Continue PNV  - Finish betamethasone course   - Delivery is not currently indicated. If delivery becomes necessary and patient is <32w GA, will give MgSO4 for fetal neuroprotection  - continue with inpatient care for now

## 2023-01-18 NOTE — CHART NOTE - NSCHARTNOTEFT_GEN_A_CORE
LATE ENTRY     Patient feeling painful pain. Rated 6/10.     FHT: baseline 130s, moderate variability, +accels, -decels   [place]: abelino every   SVE: 0/0/-3 @1927    Patient admitted for ACS in the setting of  contractions and prior CS.   -VSS   -Reactive NST   -Will offer stadol if patient desires, declines at this time   -Continue rest of current management     D/W Dr. Rose LATE ENTRY     Patient feeling painful pain. Rated 6/10.     FHT: baseline 130s, moderate variability, +accels, -decels   [place]: abelino every   SVE: 0/0/-3 @1927    Patient admitted for ACS in the setting of  contractions and prior CS.   -VSS   -Reactive NST   -Will offer stadol if patient desires, declines at this time   -Continue rest of current management     D/W Dr. Rose    I agree with the documentation above.    Yo Rose MD

## 2023-01-18 NOTE — CHART NOTE - NSCHARTNOTEFT_GEN_A_CORE
LATE ENTRY     Patient seen and evaluated at bedside for spontaneous 2 minute deceleration. Patient sleeping, repositioned.     FHT: baseline 140s, moderate variability, -accels, + 2 minute decel x1   [place]: abelino every 1-2 minutes     Patient admitted for ACS in the setting of  contractions and prior CS.   -VSS   -S/p 2 minute deceleration, otherwise reactive NST   -If persists, will consider MgSO4 and/or delivery   -Continue rest of current management     Dr. Rose made aware.

## 2023-01-18 NOTE — PROGRESS NOTE ADULT - PROBLEM SELECTOR PLAN 5
- footling breech presentation noted on bedside sonogram yesterday  - due for [facility] sonogram, will perform today and assess for fetal presentation  - if delivery becomes necessary, plan for repeat  section

## 2023-01-18 NOTE — PROGRESS NOTE ADULT - PROBLEM SELECTOR PLAN 4
- history of primary  section in 2020 secondary to NRFHT after admission for labor  - as fetus is currently in breech presentation, plan for repeat  section if delivery become necessary

## 2023-01-19 ENCOUNTER — TRANSCRIPTION ENCOUNTER (OUTPATIENT)
Age: 23
End: 2023-01-19

## 2023-01-19 DIAGNOSIS — O23.599 INFECTION OF OTHER PART OF GENITAL TRACT IN PREGNANCY, UNSPECIFIED TRIMESTER: ICD-10-CM

## 2023-01-19 LAB
A1C WITH ESTIMATED AVERAGE GLUCOSE RESULT: 4.8 % — SIGNIFICANT CHANGE UP (ref 4–5.6)
APTT BLD: 23.2 SEC — LOW (ref 27.5–35.5)
BASOPHILS # BLD AUTO: 0.1 K/UL — SIGNIFICANT CHANGE UP (ref 0–0.2)
BASOPHILS NFR BLD AUTO: 0.5 % — SIGNIFICANT CHANGE UP (ref 0–2)
BLD GP AB SCN SERPL QL: SIGNIFICANT CHANGE UP
EOSINOPHIL # BLD AUTO: 0.01 K/UL — SIGNIFICANT CHANGE UP (ref 0–0.5)
EOSINOPHIL NFR BLD AUTO: 0 % — SIGNIFICANT CHANGE UP (ref 0–6)
ESTIMATED AVERAGE GLUCOSE: 91 MG/DL — SIGNIFICANT CHANGE UP (ref 68–114)
FIBRINOGEN PPP-MCNC: 558 MG/DL — HIGH (ref 330–520)
HCT VFR BLD CALC: 24.4 % — LOW (ref 34.5–45)
HGB BLD-MCNC: 6.8 G/DL — CRITICAL LOW (ref 11.5–15.5)
IMM GRANULOCYTES NFR BLD AUTO: 7.2 % — HIGH (ref 0–0.9)
INR BLD: 1.09 RATIO — SIGNIFICANT CHANGE UP (ref 0.88–1.16)
LYMPHOCYTES # BLD AUTO: 12 % — LOW (ref 13–44)
LYMPHOCYTES # BLD AUTO: 2.61 K/UL — SIGNIFICANT CHANGE UP (ref 1–3.3)
MCHC RBC-ENTMCNC: 18.5 PG — LOW (ref 27–34)
MCHC RBC-ENTMCNC: 27.9 GM/DL — LOW (ref 32–36)
MCV RBC AUTO: 66.3 FL — LOW (ref 80–100)
MONOCYTES # BLD AUTO: 1.68 K/UL — HIGH (ref 0–0.9)
MONOCYTES NFR BLD AUTO: 7.7 % — SIGNIFICANT CHANGE UP (ref 2–14)
NEUTROPHILS # BLD AUTO: 15.83 K/UL — HIGH (ref 1.8–7.4)
NEUTROPHILS NFR BLD AUTO: 72.6 % — SIGNIFICANT CHANGE UP (ref 43–77)
NRBC # BLD: 3 /100 WBCS — HIGH (ref 0–0)
PLATELET # BLD AUTO: 483 K/UL — HIGH (ref 150–400)
PROTHROM AB SERPL-ACNC: 12.7 SEC — SIGNIFICANT CHANGE UP (ref 10.5–13.4)
RBC # BLD: 3.68 M/UL — LOW (ref 3.8–5.2)
RBC # FLD: 24.6 % — HIGH (ref 10.3–14.5)
WBC # BLD: 21.81 K/UL — HIGH (ref 3.8–10.5)
WBC # FLD AUTO: 21.81 K/UL — HIGH (ref 3.8–10.5)

## 2023-01-19 PROCEDURE — 93010 ELECTROCARDIOGRAM REPORT: CPT

## 2023-01-19 PROCEDURE — 99232 SBSQ HOSP IP/OBS MODERATE 35: CPT | Mod: GC

## 2023-01-19 RX ORDER — ONDANSETRON 8 MG/1
4 TABLET, FILM COATED ORAL ONCE
Refills: 0 | Status: COMPLETED | OUTPATIENT
Start: 2023-01-19 | End: 2023-01-19

## 2023-01-19 RX ORDER — ACETAMINOPHEN 500 MG
975 TABLET ORAL ONCE
Refills: 0 | Status: COMPLETED | OUTPATIENT
Start: 2023-01-19 | End: 2023-01-19

## 2023-01-19 RX ORDER — METRONIDAZOLE 500 MG
1 TABLET ORAL
Qty: 10 | Refills: 0
Start: 2023-01-19 | End: 2023-01-23

## 2023-01-19 RX ORDER — ASPIRIN/CALCIUM CARB/MAGNESIUM 324 MG
81 TABLET ORAL DAILY
Refills: 0 | Status: DISCONTINUED | OUTPATIENT
Start: 2023-01-19 | End: 2023-01-20

## 2023-01-19 RX ORDER — ALBUTEROL 90 UG/1
2 AEROSOL, METERED ORAL EVERY 6 HOURS
Refills: 0 | Status: DISCONTINUED | OUTPATIENT
Start: 2023-01-19 | End: 2023-01-20

## 2023-01-19 RX ADMIN — Medication 81 MILLIGRAM(S): at 12:47

## 2023-01-19 RX ADMIN — Medication 500 MILLIGRAM(S): at 18:01

## 2023-01-19 RX ADMIN — ONDANSETRON 4 MILLIGRAM(S): 8 TABLET, FILM COATED ORAL at 13:31

## 2023-01-19 RX ADMIN — Medication 500 MILLIGRAM(S): at 05:20

## 2023-01-19 RX ADMIN — Medication 975 MILLIGRAM(S): at 23:57

## 2023-01-19 RX ADMIN — Medication 325 MILLIGRAM(S): at 12:48

## 2023-01-19 RX ADMIN — Medication 1 TABLET(S): at 12:48

## 2023-01-19 NOTE — PROGRESS NOTE ADULT - PROBLEM SELECTOR PLAN 1
- Dated by LMP consistent with 1st trimester sono  - Sono performed yesterday  - Reactive NST this am, continue qShift NSTs  - Continue PNV  - s/p BMZ  - Delivery is not currently indicated. If delivery becomes necessary and patient is <32w GA, will give MgSO4 for fetal neuroprotection  - continue with inpatient care for now.

## 2023-01-19 NOTE — DISCHARGE NOTE ANTEPARTUM - PATIENT PORTAL LINK FT
You can access the FollowMyHealth Patient Portal offered by [facility] by registering at the following website: [email or web address]. By joining [facility]’s FollowMyHealth portal, you will also be able to view your health information using other applications (apps) compatible with our system.

## 2023-01-19 NOTE — DISCHARGE NOTE ANTEPARTUM - MEDICATION SUMMARY - MEDICATIONS TO TAKE
I will START or STAY ON the medications listed below when I get home from the hospital:    metroNIDAZOLE 500 mg oral tablet  -- 1 tab(s) by mouth 2 times a day  -- Indication: For Bacterial vaginosis in pregnancy    Aspirin Low Dose 81 mg oral tablet, chewable  -- 1 tab(s) by mouth once a day  -- Indication: For History of pre-eclampsia    Prenatal Multivitamins with Folic Acid 1 mg oral tablet  -- 1 tab(s) by mouth once a day  -- Indication: For 31 weeks gestation of pregnancy    FeroSul 325 mg (65 mg elemental iron) oral tablet  -- 1 tab(s) by mouth 3 times a day   -- Check with your doctor before becoming pregnant.  Do not chew, break, or crush.  May discolor urine or feces.    -- Indication: For Anemia in pregnancy, third trimester    folic acid 1 mg oral tablet  -- 1 tab(s) by mouth once a day  -- Indication: For 31 weeks gestation of pregnancy   I will START or STAY ON the medications listed below when I get home from the hospital:    metroNIDAZOLE 500 mg oral tablet  -- 1 tab(s) by mouth 2 times a day  -- Indication: For Bacterial vaginosis in pregnancy    Aspirin Low Dose 81 mg oral tablet, chewable  -- 1 tab(s) by mouth once a day  -- Indication: For History of pre-eclampsia    Prenatal Multivitamins with Folic Acid 1 mg oral tablet  -- 1 tab(s) by mouth once a day  -- Indication: For 31 weeks gestation of pregnancy    FeroSul 325 mg (65 mg elemental iron) oral tablet  -- 1 tab(s) by mouth 3 times a day   -- Check with your doctor before becoming pregnant.  Do not chew, break, or crush.  May discolor urine or feces.    -- Indication: For Anemia in pregnancy, third trimester    folic acid 1 mg oral tablet  -- 1 tab(s) by mouth once a day  -- Indication: For 32 weeks gestation of pregnancy

## 2023-01-19 NOTE — PROGRESS NOTE ADULT - PROBLEM SELECTOR PLAN 9
- history of hyperemesis during this pregnancy, now improved  - mild intermittent nausea  - Zofran PRN

## 2023-01-19 NOTE — DISCHARGE NOTE ANTEPARTUM - HOSPITAL COURSE
Patient admitted for  contractions. She was given betamethasone and indocin for 24 hours. She had significant iron deficiency anemia. Initially she was given an iron infusion, but was then given 2u pRBCs. Her symptomatic anemia resolved and her contractions improved.

## 2023-01-19 NOTE — DISCHARGE NOTE ANTEPARTUM - CARE PLAN
1 Principal Discharge DX:	Premature uterine contractions  Assessment and plan of treatment:	S/p BMZ. If you have painful contractions, decreased fetal movement, vaginal bleeding or leakage of fluid like you broke your water, please return to the hospital  Secondary Diagnosis:	Anemia in pregnancy, third trimester  Assessment and plan of treatment:	s/p transfusion of 2u pRBCs  recommend hematology outpatient followup  recommend continued iron infusions  Secondary Diagnosis:	Bacterial vaginosis in pregnancy  Assessment and plan of treatment:	continue flagyl 2 times a day for a total of one week   Principal Discharge DX:	Premature uterine contractions  Assessment and plan of treatment:	S/p BMZ. If you have painful contractions, decreased fetal movement, vaginal bleeding or leakage of fluid like you broke your water, please return to the hospital  Secondary Diagnosis:	Anemia in pregnancy, third trimester  Assessment and plan of treatment:	s/p transfusion of 2u pRBCs  recommend hematology outpatient followup  recommend continued iron infusions  Secondary Diagnosis:	Bacterial vaginosis in pregnancy  Assessment and plan of treatment:	continue flagyl 2 times a day for a total of one week  Secondary Diagnosis:	Pain pelvic  Assessment and plan of treatment:	recommend outpatient physical therapy for pelvic discomfort in pregnancy

## 2023-01-19 NOTE — DISCHARGE NOTE ANTEPARTUM - CARE PROVIDER_API CALL
Maksim Avina (DO)  Obstetrics and Gynecology  [address] [place] [address]  Phone: [phone]  Fax: [phone]  Follow Up Time: 1 week   Maksim Avina (DO)  Obstetrics and Gynecology  [address], [address] [place] [address]  Phone: [phone]  Fax: [phone]  Scheduled Appointment: 01/25/2023

## 2023-01-19 NOTE — DISCHARGE NOTE ANTEPARTUM - PLAN OF CARE
S/p BMZ. If you have painful contractions, decreased fetal movement, vaginal bleeding or leakage of fluid like you broke your water, please return to the hospital s/p transfusion of 2u pRBCs  recommend hematology outpatient followup  recommend continued iron infusions continue flagyl 2 times a day for a total of one week recommend outpatient physical therapy for pelvic discomfort in pregnancy

## 2023-01-19 NOTE — CHART NOTE - NSCHARTNOTEFT_GEN_A_CORE
Pt evaluated while downstairs for NST due to abdominal discomfort  patient reports that she's feeling constant bilateral pulling, tugging, and tightening, which has worsened slowly throughout the day    Vital Signs Last 24 Hrs  T(C): 36.8 (19 Jan 2023 19:17), Max: 37.3 (19 Jan 2023 16:30)  T(F): 98.2 (19 Jan 2023 19:17), Max: 99.1 (19 Jan 2023 16:30)  HR: 94 (19 Jan 2023 19:17) (78 - 113)  BP: 124/76 (19 Jan 2023 19:17) (109/67 - 128/77)  RR: 18 (19 Jan 2023 19:17) (16 - 18)  SpO2: 100% (19 Jan 2023 19:17) (97% - 100%)    Parameters below as of 19 Jan 2023 04:27  Patient On (Oxygen Delivery Method): room air    Gen: no acute distress  CV: regular rate and rhythm   Pulm: clear bilaterally to auscultation  Abd: nontender, gravid, soft on palpation  : no flank pain; musculoskeletal discomfort reproducible   Ext: no calf tenderness; +1 swelling     Tracing: baseline 135, moderate variability, +accels, no decels  [place]: rare ctx  SVE: 0/0/-3    Dispo: tylenol, PO hydration, continue to monitor

## 2023-01-19 NOTE — DISCHARGE NOTE ANTEPARTUM - PROVIDER TOKENS
PROVIDER:[TOKEN:[[id number]],FOLLOWUP:[1 week]] PROVIDER:[TOKEN:[[id number]],SCHEDULEDAPPT:[01/25/2023]]

## 2023-01-19 NOTE — DISCHARGE NOTE ANTEPARTUM - NS MD DC FALL RISK RISK
For information on Fall & Injury Prevention, visit: [email or web address] OR  [email or web address] OR  https://www.cdc.gov/steadi/patient.html

## 2023-01-20 VITALS
TEMPERATURE: 98 F | DIASTOLIC BLOOD PRESSURE: 66 MMHG | RESPIRATION RATE: 18 BRPM | OXYGEN SATURATION: 99 % | SYSTOLIC BLOOD PRESSURE: 116 MMHG | HEART RATE: 89 BPM

## 2023-01-20 DIAGNOSIS — O32.2XX0 MATERNAL CARE FOR TRANSVERSE AND OBLIQUE LIE, NOT APPLICABLE OR UNSPECIFIED: ICD-10-CM

## 2023-01-20 DIAGNOSIS — Z3A.32 32 WEEKS GESTATION OF PREGNANCY: ICD-10-CM

## 2023-01-20 LAB
FIBRINOGEN PPP-MCNC: 508 MG/DL — SIGNIFICANT CHANGE UP (ref 330–520)
HCT VFR BLD CALC: 27.4 % — LOW (ref 34.5–45)
HGB BLD-MCNC: 7.9 G/DL — LOW (ref 11.5–15.5)
MCHC RBC-ENTMCNC: 20.3 PG — LOW (ref 27–34)
MCHC RBC-ENTMCNC: 28.8 GM/DL — LOW (ref 32–36)
MCV RBC AUTO: 70.3 FL — LOW (ref 80–100)
NRBC # BLD: 7 /100 WBCS — HIGH (ref 0–0)
PLATELET # BLD AUTO: 441 K/UL — HIGH (ref 150–400)
RBC # BLD: 3.9 M/UL — SIGNIFICANT CHANGE UP (ref 3.8–5.2)
RBC # FLD: 26.6 % — HIGH (ref 10.3–14.5)
WBC # BLD: 19.02 K/UL — HIGH (ref 3.8–10.5)
WBC # FLD AUTO: 19.02 K/UL — HIGH (ref 3.8–10.5)

## 2023-01-20 PROCEDURE — 82962 GLUCOSE BLOOD TEST: CPT

## 2023-01-20 PROCEDURE — 86900 BLOOD TYPING SEROLOGIC ABO: CPT

## 2023-01-20 PROCEDURE — 99232 SBSQ HOSP IP/OBS MODERATE 35: CPT | Mod: GC

## 2023-01-20 PROCEDURE — 86780 TREPONEMA PALLIDUM: CPT

## 2023-01-20 PROCEDURE — U0005: CPT

## 2023-01-20 PROCEDURE — 85730 THROMBOPLASTIN TIME PARTIAL: CPT

## 2023-01-20 PROCEDURE — 83036 HEMOGLOBIN GLYCOSYLATED A1C: CPT

## 2023-01-20 PROCEDURE — 36430 TRANSFUSION BLD/BLD COMPNT: CPT

## 2023-01-20 PROCEDURE — 86901 BLOOD TYPING SEROLOGIC RH(D): CPT

## 2023-01-20 PROCEDURE — 82728 ASSAY OF FERRITIN: CPT

## 2023-01-20 PROCEDURE — 84156 ASSAY OF PROTEIN URINE: CPT

## 2023-01-20 PROCEDURE — 85027 COMPLETE CBC AUTOMATED: CPT

## 2023-01-20 PROCEDURE — 83540 ASSAY OF IRON: CPT

## 2023-01-20 PROCEDURE — 93005 ELECTROCARDIOGRAM TRACING: CPT

## 2023-01-20 PROCEDURE — 81001 URINALYSIS AUTO W/SCOPE: CPT

## 2023-01-20 PROCEDURE — 85384 FIBRINOGEN ACTIVITY: CPT

## 2023-01-20 PROCEDURE — 87800 DETECT AGNT MULT DNA DIREC: CPT

## 2023-01-20 PROCEDURE — U0003: CPT

## 2023-01-20 PROCEDURE — 86923 COMPATIBILITY TEST ELECTRIC: CPT

## 2023-01-20 PROCEDURE — 86850 RBC ANTIBODY SCREEN: CPT

## 2023-01-20 PROCEDURE — P9016: CPT

## 2023-01-20 PROCEDURE — 80307 DRUG TEST PRSMV CHEM ANLYZR: CPT

## 2023-01-20 PROCEDURE — 82731 ASSAY OF FETAL FIBRONECTIN: CPT

## 2023-01-20 PROCEDURE — 86769 SARS-COV-2 COVID-19 ANTIBODY: CPT

## 2023-01-20 PROCEDURE — 85025 COMPLETE CBC W/AUTO DIFF WBC: CPT

## 2023-01-20 PROCEDURE — 84466 ASSAY OF TRANSFERRIN: CPT

## 2023-01-20 PROCEDURE — 85610 PROTHROMBIN TIME: CPT

## 2023-01-20 PROCEDURE — 82570 ASSAY OF URINE CREATININE: CPT

## 2023-01-20 PROCEDURE — 80053 COMPREHEN METABOLIC PANEL: CPT

## 2023-01-20 PROCEDURE — 36415 COLL VENOUS BLD VENIPUNCTURE: CPT

## 2023-01-20 PROCEDURE — 83550 IRON BINDING TEST: CPT

## 2023-01-20 RX ADMIN — Medication 500 MILLIGRAM(S): at 06:10

## 2023-01-20 NOTE — PROGRESS NOTE ADULT - SUBJECTIVE AND OBJECTIVE BOX
DIMITRIOS WELCH  [facility] 2EST 3 01  A 23year old  EDC 3/17/23 at 31 6/7 weeks gestation admitted for ACS in setting of  contractions without cervical change    She reports feeling well this morning. She states she has intermittent mild contractions, but her pain is improved from yesterday. She denies other abdominal pain. She reports good fetal movement and increased vaginal discharge. She denies vaginal bleeding. She reports occasional dizziness and shortness of breath.      Vital Signs:  Vital Signs Last 24 Hrs  T(C): 36.9 (2023 07:35), Max: 37.1 (2023 00:18)  T(F): 98.5 (2023 07:35), Max: 98.8 (2023 04:27)  HR: 80 (2023 07:35) (80 - 114)  BP: 128/77 (2023 07:35) (109/67 - 128/77)  RR: 16 (2023 07:35) (16 - 16)  SpO2: 100% (2023 07:35) (97% - 100%)    Parameters below as of 2023 04:27  Patient On (Oxygen Delivery Method): room air          Physical Exam:  General: Adult female in NAD  Head/Neck: No neck masses, no lymphadenopathy  CVS: RRR, +S1/S2, no murmurs  Lungs: CTAB, no wheezing, rhonchi or rales  Abdomen: soft, non-tender, gravid uterus  Pelvic: Deferred  Ext: No cyanosis, edema or calf tenderness  Skin: No rashes or lesions on exposed skin  Neuro: Normal DTRs, grossly intact    Labs:                          6.8    21.81 )-----------( 483      ( 2023 05:30 )             24.4           PT/INR - ( 2023 05:30 )   PT: 12.7 sec;   INR: 1.09 ratio         PTT - ( 2023 05:30 )  PTT:23.2 sec        Radiology:    MEDICATIONS  (STANDING):  aspirin  chewable 81 milliGRAM(s) Oral daily  ferrous    sulfate 325 milliGRAM(s) Oral daily  lactated ringers. 1000 milliLiter(s) (75 mL/Hr) IV Continuous <Continuous>  metroNIDAZOLE    Tablet 500 milliGRAM(s) Oral two times a day  ondansetron Injectable 4 milliGRAM(s) IV Push once  prenatal multivitamin 1 Tablet(s) Oral daily    MEDICATIONS  (PRN):  acetaminophen     Tablet .. 650 milliGRAM(s) Oral every 6 hours PRN Mild Pain (1 - 3), Moderate Pain (4 - 6)  
DIMITRIOS WELCH  [facility] 2EST  01  A 23year old   EDC 3/17/23 at 32w GA admitted for ACS in setting of  contractions w/o cervical change with history of  section. Pt reports overall feeling better than when she was first admitted. She denies regular contractions with tightening and relaxing of her uterus. She endorses low pelvic and hip pain, worse with walking. She reports good feeling movement. Notes increased mucus-y discharge. Denies vaginal bleeding. Denies CP, SOB. Pt expressed desire to be discharged from the hospital.    Vital Signs:  Vital Signs Last 24 Hrs  T(C): 37.2 (2023 07:42), Max: 37.3 (2023 16:30)  T(F): 98.9 (2023 07:42), Max: 99.1 (2023 16:30)  HR: 82 (2023 07:42) (78 - 101)  BP: 106/66 (2023 07:42) (106/66 - 126/79)  RR: 18 (2023 07:42) (16 - 18)  SpO2: 99% (2023 07:42) (98% - 100%)    Parameters below as of 2023 07:42  Patient On (Oxygen Delivery Method): room air    Physical Exam:  General: Adult female in NAD  Neuro: A&Ox3   Head/Neck: No neck masses, no lymphadenopathy  CVS: RRR, +S1/S2, no murmurs  Lungs: CTAB, no wheezing, rhonchi or rales  Abdomen: soft, non-tender, gravid uterus  Pelvic: Deferred  Ext: No cyanosis, edema or calf tenderness  Skin: No rashes or lesions on exposed skin    Labs:                          7.9    19.02 )-----------( 441      ( 2023 05:49 )             27.4       PT/INR - ( 2023 05:30 )   PT: 12.7 sec;   INR: 1.09 ratio         PTT - ( 2023 05:30 )  PTT:23.2 sec    Radiology:    MEDICATIONS  (STANDING):  aspirin  chewable 81 milliGRAM(s) Oral daily  ferrous    sulfate 325 milliGRAM(s) Oral daily  lactated ringers. 1000 milliLiter(s) (75 mL/Hr) IV Continuous <Continuous>  metroNIDAZOLE    Tablet 500 milliGRAM(s) Oral two times a day  prenatal multivitamin 1 Tablet(s) Oral daily    MEDICATIONS  (PRN):  acetaminophen     Tablet .. 650 milliGRAM(s) Oral every 6 hours PRN Mild Pain (1 - 3), Moderate Pain (4 - 6)  albuterol    90 MICROgram(s) HFA Inhaler 2 Puff(s) Inhalation every 6 hours PRN Shortness of Breath and/or Wheezing  
DIMITRIOS WELCH  [facility] DELV 2099 02  A 23year old  w history of prior CS  EDC 3/17/23 at 31w5d GA admitted for ACS in the setting of  contractions without cervical change. Continuous fetal monitoring overnight has been overall reactive with isolated 2 minute spontaneous deceleration at 0400 that recovered with repositioning. Pt continues to endorse episodes of frequent, painful contractions that the space out before recurring. She endorses normal fetal movement, denies loss of fluid, vaginal bleeding. Denies lightheadedness, dizziness, CP, SOB.     Vital Signs:  Vital Signs Last 24 Hrs  T(C): 36.8 (2023 02:59), Max: 36.8 (2023 19:33)  T(F): 98.24 (2023 02:59), Max: 98.24 (2023 19:33)  HR: 80 (2023 02:59) (63 - 131)  BP: 117/73 (2023 02:59) (113/69 - 136/69)  RR: 18 (2023 02:59) (18 - 19)  SpO2: 99% (2023 20:49) (98% - 100%)    Height (cm): 162.6 (23 @ 10:09)  Weight (kg): 77.1 (23 @ 10:09)  BMI (kg/m2): 29.2 (23 @ 10:09)  BSA (m2): 1.83 (23 @ 10:09)    Physical Exam:  General: Adult female in NAD  Neuro: A&Ox3    Head/Neck: No neck masses, no lymphadenopathy  CVS: RRR, +S1/S2, no murmurs  Lungs: CTAB, no wheezing, rhonchi or rales  Abdomen: soft, rt-sided abdominal tenderness, gravid uterus  Pelvic: Deferred  Ext: No cyanosis, edema or calf tenderness  Skin: No rashes or lesions on exposed skin    Labs:                    7.2    12.32 )-----------( 475      ( 2023 17:45 )             25.5         134<L>  |  102  |  8.7  ----------------------------<  73  4.1   |  23.0  |  0.33<L>    Ca    8.7      2023 07:47    TPro  7.2  /  Alb  3.4  /  TBili  0.4  /  DBili  x   /  AST  19  /  ALT  8   /  AlkPhos  67  -    PT/INR - ( 2023 17:45 )   PT: 12.9 sec;   INR: 1.11 ratio         PTT - ( 2023 17:45 )  PTT:29.7 sec    MEDICATIONS  (STANDING):  betamethasone Injectable 12 milliGRAM(s) IntraMuscular every 24 hours  ferrous    sulfate 325 milliGRAM(s) Oral daily  folic acid 1 milliGRAM(s) Oral daily  indomethacin 25 milliGRAM(s) Oral every 6 hours  lactated ringers. 1000 milliLiter(s) (75 mL/Hr) IV Continuous <Continuous>  prenatal multivitamin 1 Tablet(s) Oral daily

## 2023-01-20 NOTE — PROGRESS NOTE ADULT - PROBLEM SELECTOR PLAN 5
- Breech presentation on sonogram  - If delivery indicated, will plan for repeat  delivery. - as fetus is currently in transverse presentation, plan for repeat  section if delivery become necessary.

## 2023-01-20 NOTE — PROGRESS NOTE ADULT - PROBLEM SELECTOR PLAN 8
- BV positive on affirm  - continue PO metronidazole x 7 days.
- GBS positive on vaginal culture performed 1/10/23  - as fetus is currently in breech presentation, plan for repeat  section if delivery become necessary, and prophylactic antibiotics are not indicated.
- GBS positive on vaginal culture performed 1/10/23  - as fetus is currently in breech presentation, plan for repeat  section if delivery become necessary, and prophylactic antibiotics are not indicated.

## 2023-01-20 NOTE — PROGRESS NOTE ADULT - PROBLEM SELECTOR PLAN 4
- history of primary  section in 2020 secondary to NRFHT after admission for labor  - as fetus is currently in breech presentation, plan for repeat  section if delivery become necessary. - history of primary  section in 2020 secondary to NRFHT after admission for labor  - as fetus is currently in transverse presentation, plan for repeat  section if delivery become necessary.

## 2023-01-20 NOTE — PROGRESS NOTE ADULT - PROBLEM SELECTOR PLAN 11
- SCDs while in bed  - plan for subcutaneous heparin on today if still inpatient  - T&S q3 days  - continuous uterine and fetal monitoring   - s/p BMZ for ACS  - if delivery becomes necessary and patient is <32w GA, will give MgSO4 for fetal neuroprotection.
- SCDs while in bed  - plan to start subcutaneous heparin today if not discharged  - keep T&S active  - NST qshift   - s/p BMZ for ACS

## 2023-01-20 NOTE — PROGRESS NOTE ADULT - PROBLEM SELECTOR PLAN 9
Lumbar Radiofrequency Ablation:  SURGEON: Gwendloyn Castleman, MD    PRE-OP DIAGNOSIS: M47.817 (lumbosacral spondylosis), M54.5 (low back pain)    POST-OP DIAGNOSIS: Same. PROCEDURE PERFORMED: Radiofrequency Ablation Medial Branch Nerve at Left L4 - 5 and L5 - S1 facet joint(s). HISTORY AND INDICATIONS: Satisfactory response with previous RFA/ medial branch blocks at the indicated levels. Recurrence of painful symptoms attributed to the above diagnosis. The planned treatment is medically necessary to relieve pain, restore function and or reduce reliance on pain medication. EBL: minimal    CONSENT: Patient has undergone the educational process with this procedure, is aware and fully understands the risks involved: potential damage to any and all body organs including possible bleeding, infection, nerve injury, paralysis, allergic reaction and headache. Patient also understands that the procedure will be undertaken in a safe, controlled and monitored setting. Patient recognizes that the benefits may include relief from pain and reduction in the oral use of medications. Patient agreed to proceed. The patient was counseled at length about the risks of grazyna Covid-19 during their perioperative period and any recovery window from their procedure. The patient was made aware that grazyna Covid-19  may worsen their prognosis for recovering from their procedure  and lend to a higher morbidity and/or mortality risk. All material risks, benefits, and reasonable alternatives including postponing the procedure were discussed. The patient does wish to proceed with the procedure at this time. MONITORS INSTITUTED INCLUDE but not limited to:   Pulse Oximetry  Non-invasive blood pressure monitoring    PROCEDURE NOTE: The patient was taken to the procedure room and placed prone with the appropriate padding and positioning to assure patient comfort and physician access to the procedure site.  Time out was performed prior to starting the procedure. Fluoroscopic evaluation was utilized to target the appropriate treatment areas. The skin was prepped with antiseptic solution and draped sterilely. Then 0.5% lidocaine was used to anesthetize the skin and subcutaneous tissue. Under fluoroscopic guidance a 20 gauge x 10cm x 10mm active tip was advanced to the medial branch nerve at the indicated levels below to innervate the following facet joints Left L4 - 5 and L5 - S1 . The needles were placed sequentially. Position confirmed radiographically with the fluoroscope. Active tip corresponding at the base of the superior articulating process and/or sacral ala for the lumbar RFA. Motor stimulation checked at 2hz up to 3V and confirmed negative for radicular stimulation. After confirmation of the needle placement the patient received 1 ml of 0.25% marcaine to provide anesthesia. Radiofrequency was delivered to the lumbar region at 80 degrees for a limit of 90 seconds in length with no ill effect. The patient tolerated the procedure well and was transported to the recovery room where the patient was monitored with no complications and with stable vital signs. Patient was discharged with appropriate written discharge instructions. Follow-up discussed.       Melva Hernández MD - GBS positive on vaginal culture performed 1/10/23  - as fetus is currently in breech presentation, plan for repeat  section if delivery become necessary, and prophylactic antibiotics are not indicated. - GBS positive on vaginal culture performed 1/10/23  - as fetus is currently in transverse presentation, plan for repeat  section if delivery become necessary, and prophylactic antibiotics are not indicated.

## 2023-01-20 NOTE — PROGRESS NOTE ADULT - PROBLEM SELECTOR PLAN 3
- iron studies confirm iron-deficiency anemia  - Hgb 6.8 yesterday, received 2u pRBC with rise to 7.9 this morning  - pt denies CP, SOB, lightheadedness, dizziness  - continue to monitor
Patient seen at bedside to obtain consent for blood transfusion., Hgb 6.8, with mild symptoms. Discussed with patient recommendation for blood transfusion at this time. Risks and benefits of blood transfusion provided including:    Risks:  • Uncommon (1-5%) chance: Mild reactions resulting in itching, rash, fever, headaches.  • Rare (<1% chance): Respiratory distress (shortness of breath) or lung injury, Exposure to blood borne micro-organisms (bacteria and parasites) that could result in an infection, Possible effects on the immune system which may decrease the body’s ability to fight infection, Exposure to blood borne viruses such as hepatitis B, Shock  • Extremely rare (< 1/1,000,000): Exposure to blood borne viruses such as hepatitis C  and HIV, Death    Benefits: Improved blood supply to the major organs of the body including:, brain, heart, kidney and lungs.    Possible alternatives discussed including no transfusion, intravenous fluids, and use of blood formation agents such as erythropoietin and iron.     After providing the above, patient has consented to a blood  transfusion. Form signed, witnessed by nurse and placed in chart.
- persistent anemia in pregnancy, on PO iron at home  - pt asymptomatic, denies lightheadedness, CP, Sob  - Hgb 8.2 on admission, decreased to 7.2 after receiving multiple boluses of fluid  - Hgb electrophoresis within normal limits  - received venofer yesterday, continue with PO iron

## 2023-01-20 NOTE — PROGRESS NOTE ADULT - ASSESSMENT
[age]year old  EDC 3/17/23 at 31 6/7 weeks gestation admitted for ACS in setting of  contractions without cervical change HD#3
23year old   EDC 3/17/23 at 32w GA admitted for ACS in setting of  contractions w/o cervical change with history of  section. HD#4
23year old  w history of prior CS  EDC 3/17/23 at 31w5d GA admitted for ACS in the setting of  contractions without cervical change. HD#2

## 2023-01-20 NOTE — PROGRESS NOTE ADULT - PROBLEM SELECTOR PLAN 7
- history of hyperemesis during this pregnancy, now improved  - denies current n/v   - Zofran PRN.
- history of hyperemesis during this pregnancy, now resolved
Increases risk for stillbirth, hypertension, gestational diabetes, and operative complications.

## 2023-01-20 NOTE — PROGRESS NOTE ADULT - PROBLEM SELECTOR PLAN 10
Increases risk for stillbirth, hypertension, gestational diabetes, and operative complications.
- history of PEC in previous pregnancy  - continue with daily baby ASA  - normotensive throughout this pregnancy  - continue to monitor BPs while inpatient.
- SCDs while in bed  - plan for subcutaneous heparin on HD#3 if still inpatient  - T&S q3 days  - continuous uterine and fetal monitoring   - finish betamethasone course with concurrent tocolysis  - if delivery becomes necessary and patient is <32w GA, will give MgSO4 for fetal neuroprotection

## 2023-01-20 NOTE — PROGRESS NOTE ADULT - PROBLEM SELECTOR PROBLEM 4
Bacterial vaginosis in pregnancy
H/O  section complicating pregnancy
H/O  section complicating pregnancy

## 2023-01-20 NOTE — PROGRESS NOTE ADULT - PROBLEM SELECTOR PLAN 2
- pt denies regular contractions, states her abdominal/pelvic discomfort is more hip pain and related to walking. denies uterine tightening and relaxing.  - cervical exam unchanged x4, most recently examined overnight  - denies other labor complaints, denies incisional pain  - No uterine tenderness today  - FFN negative  - UA w/o evidence of infection  - BV positive on affirm, continue with PO Metronidazole  - s/p betamethasone course
- pt continues to endorse painful uterine contractions  - cervical exam unchanged x3, most recently examined at 2100 last night  - denies other labor complaints, denies incisional pain  - PE with rt-sided abdominal tenderness to touch  - FFN negative  - UA w/o evidence of infection  - follow up vaginal cultures  - will administer indomethacin for tocolysis while administering betamethasone course   - plan to repeat cervical exam if contractions worsen or become more frequent  - if delivery becomes necessary and patient is <32w GA, will give MgSO4 for fetal neuroprotection
- pt reports mild irregular contractions, improved from yesterday  - cervical exam unchanged x3, most recently examined at 2100 1/17  - denies other labor complaints, denies incisional pain  - No uterine tenderness today  - FFN negative  - UA w/o evidence of infection  - BV positive on affirm, will treat with PO Metronidazole  - will administer indomethacin for tocolysis while administering betamethasone course   - plan to repeat cervical exam if contractions worsen or become more frequent  - if delivery becomes necessary and patient is <32w GA, will give MgSO4 for fetal neuroprotection.

## 2023-01-20 NOTE — PROGRESS NOTE ADULT - PROBLEM SELECTOR PLAN 1
- Dated by LMP consistent with 1st trimester sono  - Reactive NST yesterday, AM NST pending, continue qShift NSTs  - Continue PNV  - s/p BMZ  - Delivery is not currently indicated.  - possible discharge today, pt has scheduled follow up with Dr. Avina on 1/25 - Dated by LMP consistent with 1st trimester sono  - Reactive NST yesterday, AM NST pending, continue qShift NSTs  - 1/18: MFM sono, BPP 8/8, EFW 2127g, fetus transverse maternal left presentation, fetal unilateral pyelectasis  - Continue PNV  - s/p BMZ  - Delivery is not currently indicated.  - possible discharge today, pt has scheduled follow up with Dr. Avina on 1/25

## 2023-01-20 NOTE — PROGRESS NOTE ADULT - PROBLEM SELECTOR PLAN 6
- history of PEC in previous pregnancy  - continue with daily baby ASA  - normotensive throughout this pregnancy  - continue to monitor BPs while inpatient
- history of primary  section in 2020 secondary to NRFHT after admission for labor  - as fetus is currently in breech presentation, plan for repeat  section if delivery become necessary.
- history of PEC in previous pregnancy  - continue with daily baby ASA  - normotensive throughout this pregnancy  - continue to monitor BPs while inpatient.

## 2023-01-20 NOTE — PROGRESS NOTE ADULT - ATTENDING COMMENTS
MFM (late entry) - IUP at 31w5d admitted with  contractions in a pregnancy complicated by anemia and prior CD.  Reports continued contractions requiring Tylenol for analgesia.  Also with hip and pelvic pain.  Feels like the baby 'dropped' in pelvis causing pressure.  VE repeated and unchanged.  Abdomen soft without incisional tenderness.  FHRT reassuring with irritability and irregular contractions noted.  H/H stable with thrombocytosis which may be reactive although the patient is without clinical evidence to support chorioamnionitis.  Coagulation profile stable and there is no sonographic evidence of abruption. Bedside ultrasound reassuring with AGA fetus and normal BPP. ENID appears sonographically normal.  Patient advised that there is no identifiable etiology at this time.  Fetal status is reassuring and there is no indication to proceed with delivery.  Betamethasone complete.  Analgesia as needed.  Continue IV iron transfusion.  Consider PRBC transfusion if symptomatic anemia.  Continue to trend labs with NST BID and serial abdominal and pelvic exam.   Mai Capellan MD  Maternal Fetal Medicine
MFM - IUP at 31w6d admitted for management of  contractions in pregnancy complicated by anemia and prior CD.  She reports no overnight events.  Abdominal tenderness and contraction pain improved.  No vaginal bleeding or discharge.  Reports feeling dizzy and some increased work of breathing.  No chest pain or syncope.  FHRT reassuring with irregular contractions.  Labs significant for further decrease in H/H.  Also noted to have persistent BV on vaginal culture. Given maternal symptoms, PRBC recommended and patient accepting.  Will transfuse one unit and assess response.  We reviewed expected side effects of oral Flagyl therapy and how this may be contributing to her symptoms; however, given decrease in H/H, transfusion is recommended for fetal and maternal benefit.  Will reassess this afternoon for disposition and further management.   Mai Capellan MD  Maternal Fetal Medicine
MFM Attending    23 year old  admitted at 32 weeks with  contractions in setting of prior , received betamethasone course. Doing well. Contractions resolved.    d/c lennie Gonzalez

## 2023-01-21 ENCOUNTER — RESULT REVIEW (OUTPATIENT)
Age: 23
End: 2023-01-21

## 2023-01-21 ENCOUNTER — OUTPATIENT (OUTPATIENT)
Dept: INPATIENT UNIT | Facility: HOSPITAL | Age: 23
LOS: 1 days | End: 2023-01-21
Payer: MEDICAID

## 2023-01-21 VITALS — HEART RATE: 80 BPM | DIASTOLIC BLOOD PRESSURE: 80 MMHG | SYSTOLIC BLOOD PRESSURE: 129 MMHG

## 2023-01-21 VITALS — WEIGHT: 169.98 LBS | HEIGHT: 64 IN

## 2023-01-21 DIAGNOSIS — O21.0 MILD HYPEREMESIS GRAVIDARUM: Chronic | ICD-10-CM

## 2023-01-21 DIAGNOSIS — O47.02 FALSE LABOR BEFORE 37 COMPLETED WEEKS OF GESTATION, SECOND TRIMESTER: ICD-10-CM

## 2023-01-21 DIAGNOSIS — Z98.891 HISTORY OF UTERINE SCAR FROM PREVIOUS SURGERY: Chronic | ICD-10-CM

## 2023-01-21 LAB
APPEARANCE UR: ABNORMAL
BACTERIA # UR AUTO: ABNORMAL
BILIRUB UR-MCNC: NEGATIVE — SIGNIFICANT CHANGE UP
COLOR SPEC: YELLOW — SIGNIFICANT CHANGE UP
DIFF PNL FLD: ABNORMAL
EPI CELLS # UR: SIGNIFICANT CHANGE UP
GLUCOSE UR QL: NEGATIVE MG/DL — SIGNIFICANT CHANGE UP
KETONES UR-MCNC: ABNORMAL
LEUKOCYTE ESTERASE UR-ACNC: ABNORMAL
NITRITE UR-MCNC: NEGATIVE — SIGNIFICANT CHANGE UP
PH UR: 6.5 — SIGNIFICANT CHANGE UP (ref 5–8)
PROT UR-MCNC: 15
RBC CASTS # UR COMP ASSIST: NEGATIVE /HPF — SIGNIFICANT CHANGE UP (ref 0–4)
SP GR SPEC: 1.01 — SIGNIFICANT CHANGE UP (ref 1.01–1.02)
UROBILINOGEN FLD QL: 4 MG/DL
WBC UR QL: SIGNIFICANT CHANGE UP /HPF (ref 0–5)

## 2023-01-21 PROCEDURE — 76775 US EXAM ABDO BACK WALL LIM: CPT

## 2023-01-21 PROCEDURE — 96372 THER/PROPH/DIAG INJ SC/IM: CPT

## 2023-01-21 PROCEDURE — 76775 US EXAM ABDO BACK WALL LIM: CPT | Mod: 26

## 2023-01-21 PROCEDURE — G0463: CPT

## 2023-01-21 PROCEDURE — 59025 FETAL NON-STRESS TEST: CPT

## 2023-01-21 PROCEDURE — 81001 URINALYSIS AUTO W/SCOPE: CPT

## 2023-01-21 PROCEDURE — 76815 OB US LIMITED FETUS(S): CPT

## 2023-01-21 RX ORDER — ONDANSETRON 8 MG/1
4 TABLET, FILM COATED ORAL ONCE
Refills: 0 | Status: COMPLETED | OUTPATIENT
Start: 2023-01-21 | End: 2023-01-21

## 2023-01-21 RX ORDER — SODIUM CHLORIDE 9 MG/ML
1000 INJECTION, SOLUTION INTRAVENOUS ONCE
Refills: 0 | Status: COMPLETED | OUTPATIENT
Start: 2023-01-21 | End: 2023-01-21

## 2023-01-21 RX ORDER — ACETAMINOPHEN 500 MG
1000 TABLET ORAL ONCE
Refills: 0 | Status: COMPLETED | OUTPATIENT
Start: 2023-01-21 | End: 2023-01-21

## 2023-01-21 RX ADMIN — Medication 400 MILLIGRAM(S): at 12:53

## 2023-01-21 RX ADMIN — Medication 1000 MILLIGRAM(S): at 13:25

## 2023-01-21 RX ADMIN — ONDANSETRON 4 MILLIGRAM(S): 8 TABLET, FILM COATED ORAL at 12:53

## 2023-01-21 RX ADMIN — SODIUM CHLORIDE 1000 MILLILITER(S): 9 INJECTION, SOLUTION INTRAVENOUS at 12:30

## 2023-01-21 NOTE — OB RN TRIAGE NOTE - NSNURSINGINSTR_OBGYN_ALL_OB_FT
tylenol for pain, MD appointment 1/25/23  Discharge instructions given to patient by Dr Paz.  Patient verbalized understanding.

## 2023-01-21 NOTE — OB RN TRIAGE NOTE - FALL HARM RISK - UNIVERSAL INTERVENTIONS
Bed in lowest position, wheels locked, appropriate side rails in place/Call bell, personal items and telephone in reach/Instruct patient to call for assistance before getting out of bed or chair/Non-slip footwear when patient is out of bed/[place] to call system/Physically safe environment - no spills, clutter or unnecessary equipment/Purposeful Proactive Rounding/Room/bathroom lighting operational, light cord in reach

## 2023-01-21 NOTE — OB RN PATIENT PROFILE - NS_PARA_OBGYN_ALL_OB_NU
Hudson Paulino is a 41 y.o. female  presents for follow up from Er. She had headache described as squeezing on sides. She has had sxs for days. She did not have CT scan. No Known Allergies  Outpatient Prescriptions Marked as Taking for the 1/3/18 encounter (Office Visit) with Bernardo Hoffman MD   Medication Sig Dispense Refill    ergocalciferol (VITAMIN D2) 50,000 unit capsule Take 1 Cap by mouth every seven (7) days. 12 Cap 0    sucralfate (CARAFATE) 100 mg/mL suspension Take 5 mL by mouth four (4) times daily. 400 mL 1    dicyclomine (BENTYL) 10 mg capsule Take 1 Cap by mouth four (4) times daily. 60 Cap 0    LORazepam (ATIVAN) 1 mg tablet Take 1/2 hour before MRI. May repeat if needed 2 Tab 0    KRILL/OM-3/DHA/EPA/PHOSPHO/AST (MEGARED OMEGA-3 KRILL OIL PO) Take  by mouth. Patient Active Problem List   Diagnosis Code    Vitamin D deficiency E55.9    Muscle twitching R25.3    Mastodynia N64.4    Ganglion of flexor tendon sheath of right ring finger M67.441    Elevated blood pressure, situational R03.0    Obesity, morbid (HCC) E66.01     Past Medical History:   Diagnosis Date    GERD (gastroesophageal reflux disease)     Hernia, abdominal     Hernia, hiatal      Social History     Social History    Marital status:      Spouse name: N/A    Number of children: N/A    Years of education: N/A     Social History Main Topics    Smoking status: Never Smoker    Smokeless tobacco: Never Used    Alcohol use No    Drug use: No    Sexual activity: Yes     Partners: Male     Other Topics Concern    Not on file     Social History Narrative     Family History   Problem Relation Age of Onset    Hypertension Mother     Hypertension Father         Review of Systems   Constitutional: Negative for chills and fever. Respiratory: Negative for cough, shortness of breath and wheezing. Cardiovascular: Negative for chest pain and palpitations.    Gastrointestinal: Negative for nausea and vomiting. Neurological: Positive for headaches. Psychiatric/Behavioral: Negative. Vitals:    01/03/18 1543   BP: 136/88   Pulse: 80   Resp: 12   Temp: 97.8 °F (36.6 °C)   TempSrc: Oral   SpO2: 98%   Weight: 241 lb (109.3 kg)   Height: 5' 3\" (1.6 m)   PainSc:   0 - No pain       Physical Exam   Constitutional: She is oriented to person, place, and time and well-developed, well-nourished, and in no distress. Neck: Normal range of motion. Neck supple. Cardiovascular: Normal rate, regular rhythm and normal heart sounds. Pulmonary/Chest: Effort normal and breath sounds normal.   Neurological: She is alert and oriented to person, place, and time. Gait normal.   Skin: Skin is warm and dry. Psychiatric: Mood, memory, affect and judgment normal.   Nursing note and vitals reviewed. Assessment/Plan      ICD-10-CM ICD-9-CM    1. Frequent headaches R51 784.0 cyclobenzaprine (FLEXERIL) 10 mg tablet     Will continue current meds     I have discussed the diagnosis with the patient and the intended plan of care as seen in the above orders. The patient has received an after-visit summary and questions were answered concerning future plans. I have discussed medication, side effects, and warnings with the patient in detail. The patient verbalized understanding and is in agreement with the plan of care. The patient will contact the office with any additional concerns. Follow-up Disposition:  Return in about 10 days (around 1/13/2018).   lab results and schedule of future lab studies reviewed with patient    oJno Tobin MD 1

## 2023-01-21 NOTE — OB PROVIDER TRIAGE NOTE - NSHPPHYSICALEXAM_GEN_ALL_CORE
Vital Signs Last 24 Hrs  T(C): 36.7 (21 Jan 2023 12:30), Max: 36.7 (21 Jan 2023 12:30)  T(F): 98.06 (21 Jan 2023 12:30), Max: 98.06 (21 Jan 2023 12:30)  HR: 86 (21 Jan 2023 12:30) (86 - 86)  BP: 119/62 (21 Jan 2023 12:30) (119/62 - 119/62)  RR: 18 (21 Jan 2023 12:30) (18 - 18)    General: AAOx3, well appearing  Head: Normocephalic atraumatic  Abdomen: soft, gravid, nontender, nonperitonitic  MSK/Vascular: full ROM, soft compartments, warm extremities  SVE: 0/0/-3  SSE: closed cervix, no vaginal spotting, normal physiologic discharge    Bedside sono: cephalic presentation  FHT: 145bpm baseline, moderate variability, + accelerations, no decelerations  [place]: irritability

## 2023-01-21 NOTE — OB RN PATIENT PROFILE - FALL HARM RISK - UNIVERSAL INTERVENTIONS
Bed in lowest position, wheels locked, appropriate side rails in place/Call bell, personal items and telephone in reach/Instruct patient to call for assistance before getting out of bed or chair/Non-slip footwear when patient is out of bed/[place] to call system/Physically safe environment - no spills, clutter or unnecessary equipment/Purposeful Proactive Rounding/Room/bathroom lighting operational, light cord in reach

## 2023-01-21 NOTE — OB PROVIDER TRIAGE NOTE - NSOBPROVIDERNOTE_OBGYN_ALL_OB_FT
DIMITRIOS WELCH is a 23y  at 31w4d weeks GA who presents to L&D, for severe 10/10 abdominal pain and back pain starting at 1000.    - VSS  - Ofirmev given for pain control  - No cervical change or vaginal bleeding on exam  - Given back pain; possible kidney stones; UA and Renal ultrasound ordered    D/w Dr. Avina DIMITRIOS WELCH is a 23y  at 31w4d weeks GA who presents to L&D, for severe 10/10 abdominal pain and back pain starting at 1000.    - VSS  - Ofirmev given for pain control  - No cervical change or vaginal bleeding on exam  - Given back pain; possible kidney stones; UA and Renal ultrasound ordered    D/w Dr. Avina    Addendum:  Patient was re-evaluated, she did not make any cervical change and she stated that she would take Tylenol every 6 hours for pain.     Vital Signs Last 24 Hrs  T(C): 36.7 (2023 16:42), Max: 36.8 (2023 14:30)  T(F): 98.06 (2023 16:42), Max: 98.2 (2023 14:30)  HR: 80 (2023 16:43) (77 - 86)  BP: 129/80 (2023 16:43) (116/75 - 129/80)  RR: 18 (2023 16:42) (18 - 18)  General: Alert and oriented x3, no acute distress  Cardiovascular: regular rate and rhythm, no murmurs, rubs or gallops appreciated on exam  Respiratory: clear to auscultation bilaterally  Abdominal: gravid uterus, non-tender to palpation  Pelvic: 0/0/-3  Extremities: no redness, tenderness or swelling in lower extremities bilaterally     FHT: baseline 135bpm, moderate variability, +accels, -deccels  [place]: no contractions                          7.9    19.02 )-----------( 441      ( 2023 05:49 )             27.4     < from: US Renal (23 @ 15:25) >  FINDINGS:  Right kidney: 13.6 cm. There is mild right hydronephrosis with an   extrarenal pelvis, similar in appearance to prior study.  Left kidney: 13.2 cm. No renal mass, hydronephrosis or calculi.  Urinary bladder: Within normal limits.  IMPRESSION:  Mild right hydronephrosis, not significantly changed compared to prior   study.  No evidence of left hydronephrosis. No stones seen.  < end of copied text >    a/p  Patient was counseled to return to L&d if contractions worsen, vaginal bleeding, leakage of fluid or decreased fetal movement. She stated that she will take Tylenol as needed for pain and will follow up with Dr. Avina next week. Based on the labs, ultrasound and physical exam findings there is no concerns for  labor, due to lack of cervical change and no concerns for abruption due to a reassuring fetal status and no evidence of bleeding, also ultrasound and urinalysis does not show evidence of an overt infection and is likely not a clean catch. Ultrasound does not show evidence of nephrolithiasis.     d/w Dr. Avina

## 2023-01-21 NOTE — OB PROVIDER TRIAGE NOTE - HISTORY OF PRESENT ILLNESS
Pregnancy course is significant for:  1. History of PEC in previous pregnancy  2. Asthma  3. Fibroid uterus  4. Hyperemesis, now resolved  5. Previous CS x 1  6. Breech presentation    POB:   G1: pCS (12/2020) 38w secondary to NRFHT, admitted initially with ctx PEC  G2: 2021 top  PGYN: -fibroids/-cysts, denies STD hx, hx LSIL on Pap  PMH: Kidney stones, asthma  PSH: C/S  SH: Denies tobacco use, EtOH use and illicit drug use during the pregnancy; Feels safe at home  Meds: ASA, Zofran  All: oxycodone, morphine DIMITRIOS WELCH is a 23y  at 31w4d weeks GA who presents to L&D, for severe 10/10 abdominal pain and back pain starting at 1000. She reports the pain is constant. Denies any vaginal bleeding, or leakage of fluid. She reports good fetal movement. Denies dysuria, hematuria, fevers or chills.     Pregnancy course is significant for:  1. History of PEC in previous pregnancy  2. Asthma  3. Fibroid uterus  4. Hyperemesis, now resolved  5. Previous CS x 1  6. Breech presentation    POB:   G1: pCS (2020) 38w secondary to NRFHT, admitted initially with ctx PEC  G2:  top  PGYN: -fibroids/-cysts, denies STD hx, hx LSIL on Pap  PMH: Kidney stones, asthma  PSH: C/S  SH: Denies tobacco use, EtOH use and illicit drug use during the pregnancy; Feels safe at home  Meds: ASA, Zofran  All: oxycodone, morphine

## 2023-01-24 ENCOUNTER — LABORATORY RESULT (OUTPATIENT)
Age: 23
End: 2023-01-24

## 2023-01-25 ENCOUNTER — APPOINTMENT (OUTPATIENT)
Dept: OBGYN | Facility: CLINIC | Age: 23
End: 2023-01-25
Payer: MEDICAID

## 2023-01-25 VITALS
DIASTOLIC BLOOD PRESSURE: 70 MMHG | BODY MASS INDEX: 29.93 KG/M2 | SYSTOLIC BLOOD PRESSURE: 112 MMHG | HEIGHT: 64 IN | WEIGHT: 175.31 LBS

## 2023-01-25 DIAGNOSIS — Z34.90 ENCOUNTER FOR SUPERVISION OF NORMAL PREGNANCY, UNSPECIFIED, UNSPECIFIED TRIMESTER: ICD-10-CM

## 2023-01-25 DIAGNOSIS — Z01.419 ENCOUNTER FOR GYNECOLOGICAL EXAMINATION (GENERAL) (ROUTINE) W/OUT ABNORMAL FINDINGS: ICD-10-CM

## 2023-01-25 DIAGNOSIS — O35.1XX0 MATERNAL CARE FOR (SUSPECTED) CHROMOSOMAL ABNORMALITY IN FETUS, NOT APPLICABLE OR UNSPECIFIED: ICD-10-CM

## 2023-01-25 DIAGNOSIS — Z87.42 PERSONAL HISTORY OF OTHER DISEASES OF THE FEMALE GENITAL TRACT: ICD-10-CM

## 2023-01-25 PROCEDURE — 0502F SUBSEQUENT PRENATAL CARE: CPT

## 2023-01-25 PROCEDURE — 81003 URINALYSIS AUTO W/O SCOPE: CPT | Mod: QW

## 2023-01-25 PROCEDURE — 36415 COLL VENOUS BLD VENIPUNCTURE: CPT

## 2023-01-26 LAB — GLUCOSE 1H P 50 G GLC PO SERPL-MCNC: 99 MG/DL

## 2023-01-27 ENCOUNTER — ASOB RESULT (OUTPATIENT)
Age: 23
End: 2023-01-27

## 2023-01-27 ENCOUNTER — APPOINTMENT (OUTPATIENT)
Dept: ANTEPARTUM | Facility: CLINIC | Age: 23
End: 2023-01-27
Payer: MEDICAID

## 2023-01-27 DIAGNOSIS — F32.A DEPRESSION, UNSPECIFIED: ICD-10-CM

## 2023-01-27 PROCEDURE — 76819 FETAL BIOPHYS PROFIL W/O NST: CPT

## 2023-01-30 LAB
BASOPHILS # BLD AUTO: 0.03 K/UL
BASOPHILS NFR BLD AUTO: 0.3 %
EOSINOPHIL # BLD AUTO: 0.1 K/UL
EOSINOPHIL NFR BLD AUTO: 0.9 %
HCT VFR BLD CALC: 33 %
HGB BLD-MCNC: 9.2 G/DL
IMM GRANULOCYTES NFR BLD AUTO: 0.8 %
LYMPHOCYTES # BLD AUTO: 2.4 K/UL
LYMPHOCYTES NFR BLD AUTO: 22.6 %
MAN DIFF?: NORMAL
MCHC RBC-ENTMCNC: 20.6 PG
MCHC RBC-ENTMCNC: 27.9 GM/DL
MCV RBC AUTO: 74 FL
MONOCYTES # BLD AUTO: 0.88 K/UL
MONOCYTES NFR BLD AUTO: 8.3 %
NEUTROPHILS # BLD AUTO: 7.12 K/UL
NEUTROPHILS NFR BLD AUTO: 67.1 %
PLATELET # BLD AUTO: 413 K/UL
RBC # BLD: 4.46 M/UL
RBC # FLD: 30.1 %
WBC # FLD AUTO: 10.62 K/UL

## 2023-02-02 LAB
CANDIDA AB TITR SER: SIGNIFICANT CHANGE UP
G VAGINALIS DNA SPEC QL NAA+PROBE: DETECTED
T VAGINALIS SPEC QL WET PREP: SIGNIFICANT CHANGE UP

## 2023-02-06 ENCOUNTER — OUTPATIENT (OUTPATIENT)
Dept: INPATIENT UNIT | Facility: HOSPITAL | Age: 23
LOS: 1 days | End: 2023-02-06
Payer: MEDICAID

## 2023-02-06 VITALS — HEART RATE: 93 BPM | DIASTOLIC BLOOD PRESSURE: 59 MMHG | SYSTOLIC BLOOD PRESSURE: 108 MMHG

## 2023-02-06 VITALS — DIASTOLIC BLOOD PRESSURE: 73 MMHG | SYSTOLIC BLOOD PRESSURE: 121 MMHG | HEART RATE: 62 BPM

## 2023-02-06 DIAGNOSIS — O21.0 MILD HYPEREMESIS GRAVIDARUM: Chronic | ICD-10-CM

## 2023-02-06 DIAGNOSIS — Z98.891 HISTORY OF UTERINE SCAR FROM PREVIOUS SURGERY: Chronic | ICD-10-CM

## 2023-02-06 DIAGNOSIS — O47.03 FALSE LABOR BEFORE 37 COMPLETED WEEKS OF GESTATION, THIRD TRIMESTER: ICD-10-CM

## 2023-02-06 LAB
APPEARANCE UR: ABNORMAL
BACTERIA # UR AUTO: ABNORMAL
BILIRUB UR-MCNC: NEGATIVE — SIGNIFICANT CHANGE UP
COLOR SPEC: YELLOW — SIGNIFICANT CHANGE UP
DIFF PNL FLD: NEGATIVE — SIGNIFICANT CHANGE UP
EPI CELLS # UR: SIGNIFICANT CHANGE UP
GLUCOSE UR QL: NEGATIVE MG/DL — SIGNIFICANT CHANGE UP
KETONES UR-MCNC: NEGATIVE — SIGNIFICANT CHANGE UP
LEUKOCYTE ESTERASE UR-ACNC: ABNORMAL
NITRITE UR-MCNC: NEGATIVE — SIGNIFICANT CHANGE UP
PH UR: 7 — SIGNIFICANT CHANGE UP (ref 5–8)
PROT UR-MCNC: 15
RBC CASTS # UR COMP ASSIST: NEGATIVE /HPF — SIGNIFICANT CHANGE UP (ref 0–4)
SP GR SPEC: 1.01 — SIGNIFICANT CHANGE UP (ref 1.01–1.02)
UROBILINOGEN FLD QL: 4 MG/DL
WBC UR QL: SIGNIFICANT CHANGE UP /HPF (ref 0–5)

## 2023-02-06 PROCEDURE — 76815 OB US LIMITED FETUS(S): CPT

## 2023-02-06 PROCEDURE — 81001 URINALYSIS AUTO W/SCOPE: CPT

## 2023-02-06 PROCEDURE — 59025 FETAL NON-STRESS TEST: CPT

## 2023-02-06 PROCEDURE — 87800 DETECT AGNT MULT DNA DIREC: CPT

## 2023-02-06 PROCEDURE — G0463: CPT

## 2023-02-06 PROCEDURE — 36415 COLL VENOUS BLD VENIPUNCTURE: CPT

## 2023-02-06 RX ORDER — FAMOTIDINE 10 MG/ML
20 INJECTION INTRAVENOUS ONCE
Refills: 0 | Status: COMPLETED | OUTPATIENT
Start: 2023-02-06 | End: 2023-02-06

## 2023-02-06 RX ORDER — SODIUM CHLORIDE 9 MG/ML
1000 INJECTION, SOLUTION INTRAVENOUS ONCE
Refills: 0 | Status: COMPLETED | OUTPATIENT
Start: 2023-02-06 | End: 2023-02-06

## 2023-02-06 RX ADMIN — SODIUM CHLORIDE 1000 MILLILITER(S): 9 INJECTION, SOLUTION INTRAVENOUS at 12:40

## 2023-02-06 RX ADMIN — FAMOTIDINE 20 MILLIGRAM(S): 10 INJECTION INTRAVENOUS at 12:57

## 2023-02-06 NOTE — OB PROVIDER TRIAGE NOTE - NSHPPHYSICALEXAM_GEN_ALL_CORE
Vitals:   T(C): 36.8 (02-06-23 @ 11:07), Max: 36.8 (02-06-23 @ 11:07)  T(F): 98.2 (02-06-23 @ 11:07), Max: 98.2 (02-06-23 @ 11:07)  HR: 93 (02-06-23 @ 11:07) (93 - 93)  BP: 116/65 (02-06-23 @ 12:39) (108/59 - 116/65)  RR: 17 (02-06-23 @ 11:07) (17 - 17)    General: AAOx3, NAD  Heart: RRR  Abd: Soft, nontender, gravid  SVE: 0/0/-3 @ 1230    BMI (kg/m2): 29.2 (01-21-23), 29.2 (01-17-23), 28.3 (01-10-23)    FHT: 150bpm mod variability +accels -decels  [place]:  uterine irritability    MF sono (1/27): vertex, fundal, BPP 8/8

## 2023-02-06 NOTE — OB RN TRIAGE NOTE - FALL HARM RISK - RISK INTERVENTIONS

## 2023-02-06 NOTE — OB PROVIDER TRIAGE NOTE - HISTORY OF PRESENT ILLNESS
vomiting x1 this morning, nausea, diarrhea yest AM, pelvic pain, low back pain  no fevers, chills, constipation, no sick contacts, constipation, myalgias, sore throat  thick white discharge, no dysuria, no vaginal pruritis  took 1000mg tylenol at 1000   DIMITRIOS WELCH is a 23y  at 34w3d weeks GA by LMP who presents to L&D, for severe 10/10 pelvic pain and back pain starting yesterday evening. She reports the pain is constant. Reports nausea, GERD-like symptoms, and episode of vomiting this AM. Denies fevers, chills, dysuria, recent sick contacts, constipation, myalgias, sore throat. Reports thick , white vaginal discharge. Denies vaginal pruritis. Denies vaginal bleeding and leakage of fluid. Reports good fetal movement. Denies HA, vision changes, SOB, RUQ pain, leg swelling. Took 1000mg tylenol at 1000 this AM.     MISSY 3/17/23  LMP: 6/10/22    Pregnancy course is significant for:  1. History of PEC in previous pregnancy  2. Asthma  3. Fibroid uterus  4. Hyperemesis, now resolved  5. Previous CS x 1    POB:   G1: pCS (2020) 38w secondary to NRFHT, admitted initially with ctx PEC  G2:  top  PGYN: -fibroids/-cysts, denies STD hx, hx LSIL on Pap  PMH: Kidney stones, asthma  PSH: C/Sx1  SH: Denies tobacco use, EtOH use and illicit drug use during the pregnancy; Feels safe at home  Meds: ASA, Zofran  All: oxycodone (rash), morphine (N/V)

## 2023-02-06 NOTE — OB PROVIDER TRIAGE NOTE - NSOBPROVIDERNOTE_OBGYN_ALL_OB_FT
A/P: DIMITRIOS WELCH is a 23y  at 34w3d weeks GA who presents to L&D, for severe, constant, 10/10 pelvic pain and back pain starting yesterday evening associated with nausea. Also with thick white vaginal discharge.    - VSS, tracing reactive, cervix C/L/P  - IVF for uterine irritability  - Pepcid for nausea  - Affirm sent for w/u of vaginal discharge  - Will continue to observe    D/w Dr. Perez A/P: DIMITRIOS WELCH is a 23y  at 34w3d weeks GA who presents to L&D, for severe, constant, 10/10 pelvic pain and back pain starting yesterday evening associated with nausea. Also with thick white vaginal discharge.    - VSS, tracing reactive, cervix C/L/P  - IVF for uterine irritability ; affirm and UA sent to w/u for infection  - Pepcid for nausea  - Will continue to observe    D/w Dr. Perez A/P: DIMITRIOS WELCH is a 23y  at 34w3d weeks GA who presents to L&D, for severe, constant, 10/10 pelvic pain and back pain starting yesterday evening associated with nausea. Also with thick white vaginal discharge.    - VSS, tracing reactive, cervix C/L/P  - IVF for uterine irritability ; affirm and UA sent to w/u for infection  - Pepcid for nausea  - Will continue to observe    D/w Dr. Perez      ADDENDUM Dr. Smart PGY2, 1710   - VSS  - NST reactive  - [place] noted with contractions  - Cervical exam repeated 4 hours later and unchanged 0/0/-3  - Patient previously admitted in 2023 for  contraction and is s/p Betamethasone course.   - Patient states pain has improved since arrival. She denies medication for analgesia.  - Stable for discharge at this time.  - Patient with f/u with Dr. Avina in 2 days.  - Return precautions discussed.    D/w Dr. Perez

## 2023-02-08 ENCOUNTER — APPOINTMENT (OUTPATIENT)
Dept: OBGYN | Facility: CLINIC | Age: 23
End: 2023-02-08
Payer: MEDICAID

## 2023-02-08 VITALS
DIASTOLIC BLOOD PRESSURE: 70 MMHG | HEIGHT: 64 IN | BODY MASS INDEX: 30.39 KG/M2 | WEIGHT: 178 LBS | SYSTOLIC BLOOD PRESSURE: 118 MMHG

## 2023-02-08 PROCEDURE — 81003 URINALYSIS AUTO W/O SCOPE: CPT | Mod: QW

## 2023-02-08 PROCEDURE — 0502F SUBSEQUENT PRENATAL CARE: CPT

## 2023-02-14 ENCOUNTER — LABORATORY RESULT (OUTPATIENT)
Age: 23
End: 2023-02-14

## 2023-02-15 ENCOUNTER — APPOINTMENT (OUTPATIENT)
Dept: OBGYN | Facility: CLINIC | Age: 23
End: 2023-02-15
Payer: MEDICAID

## 2023-02-15 VITALS
WEIGHT: 177 LBS | DIASTOLIC BLOOD PRESSURE: 80 MMHG | HEIGHT: 64 IN | SYSTOLIC BLOOD PRESSURE: 127 MMHG | BODY MASS INDEX: 30.22 KG/M2

## 2023-02-15 PROCEDURE — 81003 URINALYSIS AUTO W/O SCOPE: CPT | Mod: QW

## 2023-02-15 PROCEDURE — 0502F SUBSEQUENT PRENATAL CARE: CPT

## 2023-02-16 LAB
HIV1+2 AB SPEC QL IA.RAPID: NONREACTIVE
T PALLIDUM AB SER QL IA: NEGATIVE

## 2023-02-17 ENCOUNTER — APPOINTMENT (OUTPATIENT)
Dept: ANTEPARTUM | Facility: CLINIC | Age: 23
End: 2023-02-17
Payer: MEDICAID

## 2023-02-17 ENCOUNTER — ASOB RESULT (OUTPATIENT)
Age: 23
End: 2023-02-17

## 2023-02-17 LAB
GP B STREP DNA SPEC QL NAA+PROBE: DETECTED
SOURCE GBS: NORMAL

## 2023-02-17 PROCEDURE — 76819 FETAL BIOPHYS PROFIL W/O NST: CPT

## 2023-02-17 PROCEDURE — 76816 OB US FOLLOW-UP PER FETUS: CPT

## 2023-02-23 ENCOUNTER — APPOINTMENT (OUTPATIENT)
Dept: OBGYN | Facility: CLINIC | Age: 23
End: 2023-02-23
Payer: MEDICAID

## 2023-02-23 VITALS
DIASTOLIC BLOOD PRESSURE: 78 MMHG | SYSTOLIC BLOOD PRESSURE: 118 MMHG | WEIGHT: 178 LBS | BODY MASS INDEX: 30.39 KG/M2 | HEIGHT: 64 IN

## 2023-02-23 PROCEDURE — 0502F SUBSEQUENT PRENATAL CARE: CPT

## 2023-02-26 RX ORDER — METRONIDAZOLE 500 MG
1 TABLET ORAL
Qty: 14 | Refills: 0
Start: 2023-02-26 | End: 2023-03-04

## 2023-02-26 NOTE — CHART NOTE - NSCHARTNOTEFT_GEN_A_CORE
Reviewed results of patient's 2/6 visit  [place] +   spoke to patient  sent metronidazole to pharmacy, she does not think she had taken previous Rx from January

## 2023-02-27 ENCOUNTER — OUTPATIENT (OUTPATIENT)
Dept: INPATIENT UNIT | Facility: HOSPITAL | Age: 23
LOS: 1 days | End: 2023-02-27
Payer: MEDICAID

## 2023-02-27 VITALS
DIASTOLIC BLOOD PRESSURE: 76 MMHG | TEMPERATURE: 98 F | RESPIRATION RATE: 22 BRPM | SYSTOLIC BLOOD PRESSURE: 118 MMHG | HEART RATE: 126 BPM

## 2023-02-27 VITALS — DIASTOLIC BLOOD PRESSURE: 74 MMHG | SYSTOLIC BLOOD PRESSURE: 115 MMHG | HEART RATE: 91 BPM

## 2023-02-27 DIAGNOSIS — O47.03 FALSE LABOR BEFORE 37 COMPLETED WEEKS OF GESTATION, THIRD TRIMESTER: ICD-10-CM

## 2023-02-27 DIAGNOSIS — Z98.891 HISTORY OF UTERINE SCAR FROM PREVIOUS SURGERY: Chronic | ICD-10-CM

## 2023-02-27 DIAGNOSIS — O21.0 MILD HYPEREMESIS GRAVIDARUM: Chronic | ICD-10-CM

## 2023-02-27 LAB
ANISOCYTOSIS BLD QL: SIGNIFICANT CHANGE UP
APPEARANCE UR: CLEAR — SIGNIFICANT CHANGE UP
BASOPHILS # BLD AUTO: 0 K/UL — SIGNIFICANT CHANGE UP (ref 0–0.2)
BASOPHILS NFR BLD AUTO: 0 % — SIGNIFICANT CHANGE UP (ref 0–2)
BILIRUB UR-MCNC: NEGATIVE — SIGNIFICANT CHANGE UP
BLD GP AB SCN SERPL QL: SIGNIFICANT CHANGE UP
COLOR SPEC: YELLOW — SIGNIFICANT CHANGE UP
DIFF PNL FLD: NEGATIVE — SIGNIFICANT CHANGE UP
EOSINOPHIL # BLD AUTO: 0 K/UL — SIGNIFICANT CHANGE UP (ref 0–0.5)
EOSINOPHIL NFR BLD AUTO: 0 % — SIGNIFICANT CHANGE UP (ref 0–6)
EPI CELLS # UR: ABNORMAL
GIANT PLATELETS BLD QL SMEAR: PRESENT — SIGNIFICANT CHANGE UP
GLUCOSE UR QL: NEGATIVE MG/DL — SIGNIFICANT CHANGE UP
HCT VFR BLD CALC: 29.9 % — LOW (ref 34.5–45)
HGB BLD-MCNC: 8.8 G/DL — LOW (ref 11.5–15.5)
KETONES UR-MCNC: ABNORMAL
LEUKOCYTE ESTERASE UR-ACNC: ABNORMAL
LYMPHOCYTES # BLD AUTO: 0.58 K/UL — LOW (ref 1–3.3)
LYMPHOCYTES # BLD AUTO: 7.1 % — LOW (ref 13–44)
MANUAL SMEAR VERIFICATION: SIGNIFICANT CHANGE UP
MCHC RBC-ENTMCNC: 20.7 PG — LOW (ref 27–34)
MCHC RBC-ENTMCNC: 29.4 GM/DL — LOW (ref 32–36)
MCV RBC AUTO: 70.2 FL — LOW (ref 80–100)
MICROCYTES BLD QL: SLIGHT — SIGNIFICANT CHANGE UP
MONOCYTES # BLD AUTO: 0.86 K/UL — SIGNIFICANT CHANGE UP (ref 0–0.9)
MONOCYTES NFR BLD AUTO: 10.6 % — SIGNIFICANT CHANGE UP (ref 2–14)
NEUTROPHILS # BLD AUTO: 6.69 K/UL — SIGNIFICANT CHANGE UP (ref 1.8–7.4)
NEUTROPHILS NFR BLD AUTO: 82.3 % — HIGH (ref 43–77)
NITRITE UR-MCNC: NEGATIVE — SIGNIFICANT CHANGE UP
PH UR: 7 — SIGNIFICANT CHANGE UP (ref 5–8)
PLAT MORPH BLD: NORMAL — SIGNIFICANT CHANGE UP
PLATELET # BLD AUTO: 377 K/UL — SIGNIFICANT CHANGE UP (ref 150–400)
POLYCHROMASIA BLD QL SMEAR: SIGNIFICANT CHANGE UP
PROT UR-MCNC: NEGATIVE — SIGNIFICANT CHANGE UP
RBC # BLD: 4.26 M/UL — SIGNIFICANT CHANGE UP (ref 3.8–5.2)
RBC # FLD: 27.1 % — HIGH (ref 10.3–14.5)
RBC BLD AUTO: ABNORMAL
RBC CASTS # UR COMP ASSIST: SIGNIFICANT CHANGE UP /HPF (ref 0–4)
SP GR SPEC: 1 — LOW (ref 1.01–1.02)
UROBILINOGEN FLD QL: 4 MG/DL
WBC # BLD: 8.13 K/UL — SIGNIFICANT CHANGE UP (ref 3.8–10.5)
WBC # FLD AUTO: 8.13 K/UL — SIGNIFICANT CHANGE UP (ref 3.8–10.5)
WBC UR QL: SIGNIFICANT CHANGE UP /HPF (ref 0–5)

## 2023-02-27 PROCEDURE — 85025 COMPLETE CBC W/AUTO DIFF WBC: CPT

## 2023-02-27 PROCEDURE — G0463: CPT

## 2023-02-27 PROCEDURE — 86900 BLOOD TYPING SEROLOGIC ABO: CPT

## 2023-02-27 PROCEDURE — 86901 BLOOD TYPING SEROLOGIC RH(D): CPT

## 2023-02-27 PROCEDURE — 99214 OFFICE O/P EST MOD 30 MIN: CPT | Mod: GC

## 2023-02-27 PROCEDURE — 59025 FETAL NON-STRESS TEST: CPT

## 2023-02-27 PROCEDURE — 36415 COLL VENOUS BLD VENIPUNCTURE: CPT

## 2023-02-27 PROCEDURE — 81001 URINALYSIS AUTO W/SCOPE: CPT

## 2023-02-27 PROCEDURE — 86780 TREPONEMA PALLIDUM: CPT

## 2023-02-27 PROCEDURE — 86850 RBC ANTIBODY SCREEN: CPT

## 2023-02-27 RX ORDER — SODIUM CHLORIDE 9 MG/ML
1000 INJECTION, SOLUTION INTRAVENOUS ONCE
Refills: 0 | Status: COMPLETED | OUTPATIENT
Start: 2023-02-27 | End: 2023-02-27

## 2023-02-27 RX ORDER — ACETAMINOPHEN 500 MG
1000 TABLET ORAL ONCE
Refills: 0 | Status: COMPLETED | OUTPATIENT
Start: 2023-02-27 | End: 2023-02-27

## 2023-02-27 RX ORDER — SODIUM CHLORIDE 9 MG/ML
1000 INJECTION, SOLUTION INTRAVENOUS
Refills: 0 | Status: DISCONTINUED | OUTPATIENT
Start: 2023-02-27 | End: 2023-03-14

## 2023-02-27 RX ORDER — ONDANSETRON 8 MG/1
4 TABLET, FILM COATED ORAL ONCE
Refills: 0 | Status: COMPLETED | OUTPATIENT
Start: 2023-02-27 | End: 2023-02-27

## 2023-02-27 RX ADMIN — Medication 1000 MILLIGRAM(S): at 20:48

## 2023-02-27 RX ADMIN — SODIUM CHLORIDE 125 MILLILITER(S): 9 INJECTION, SOLUTION INTRAVENOUS at 21:45

## 2023-02-27 RX ADMIN — Medication 400 MILLIGRAM(S): at 19:42

## 2023-02-27 RX ADMIN — ONDANSETRON 4 MILLIGRAM(S): 8 TABLET, FILM COATED ORAL at 20:33

## 2023-02-27 RX ADMIN — SODIUM CHLORIDE 1000 MILLILITER(S): 9 INJECTION, SOLUTION INTRAVENOUS at 19:29

## 2023-02-27 NOTE — OB PROVIDER TRIAGE NOTE - HISTORY OF PRESENT ILLNESS
Y.T. is a 23 y.o.  LMP 6/10/2022 who presents for contractions and back pain. The patient reports last week she began to have back pain from her lower back to her shoulders. At around 8pm last night she began to have contractions which she stated began as 5/10 and have now progressed to 10/10. She also reports b/l pelvic pain, and leg pain that began about 3-4 hours ago. She describes the leg pain as tingling and she reports feeling "no blood circulating". She also reports an "anginal pressure" in her chest. She came to the hospital about 1-2 months ago for spotting but was told it was normal. She currently denies any bleeding or leakage of fluids. She reports normal fetal movement. She states she has a clear to milky vaginal discharge that began last Tuesday.  She gave birth to her first child on 2020 (due date 2021). Her baby weighed 6 lbs 15 oz. She terminated her second pregnancy in Sep 2021. She states she had similar symptoms that were worse during her first pregnancy. First pregnancy was complicated by severe anemia, BV, pre-eclampsia, Group B Strep (treated with ABx) and resulted in an emergency . She received two blood transfusions with that pregnancy. Her PMHx includes BUCK (baseline pre-partum Iron ~7 per patient), treated with iron pills, asthma, kidney stones, migraines. She has allergies to oxycodone and morphine. Current medications include Aspirin 81mg, Iron pills and folic acid. No other PSHx. She has hyperemesis gravidum with this pregnancy, she last vomited today. She is unsure if she has preeclampsia with this pregnancy. US done 2 weeks ago showed baby weight at 7 lbs 9 oz per patient, and physician told her baby weight was "2 weeks ahead".  Patient had first abnormal Pap in , and last pap done within this pregnancy was abnormal. She denies history of STI's. She endorses nausea, vomiting and hot flashes. She denies diarrhea, fevers, vaginal pain, vaginal itching.  She receives prenatal care with Dr. Maksim Avina DO at [facility]. She reports having a blood transfusion in the ED 2 months ago for anemia.  Patient is a 24 yo.  by LMP, 6/10/2022, who presents for full body pain, worst in the abdomen and back that is 10/10 and contractions q5m starting last night at 20:00. Denies any vaginal bleeding, leakage of fluid. Reports normal fetal movement. She has no other complaints at this time.     Pregnancy course is significant for:  History of PEC in previous pregnancy  Asthma  Fibroid uterus  Hyperemesis   contractions without cervical change requiring admission    POB:   G1: pCS (2020) 38w secondary to , admitted initially with ctx PEC  G2:  top  PGYN: +fibroids/-cysts, denies STD hx, hx LSIL on Pap  PMH: Kidney stones  PSH: C/S  SH: Denies tobacco use, EtOH use and illicit drug use during the pregnancy; Feels safe at home  Meds: Doxylamine-pyridoxine, ASA, Zofran  All: oxycodone, morphine

## 2023-02-27 NOTE — OB PROVIDER TRIAGE NOTE - NS_GBS_INFANT_INVASIVE_OBGYN_ALL_OB_FT
----- Message from Radha Machado sent at 5/21/2020  4:31 PM CDT -----  Contact: Yenny Rowe needed to speak with someone in the office about getting her pupillary distance. Pt contact number is [phone]. Please contact pt.    Patient states no history

## 2023-02-27 NOTE — OB PROVIDER TRIAGE NOTE - NSHPPHYSICALEXAM_GEN_ALL_CORE
Vital Signs Last 24 Hrs  T(C): 36.5 (27 Feb 2023 17:44), Max: 36.5 (27 Feb 2023 17:44)  T(F): 97.7 (27 Feb 2023 17:44), Max: 97.7 (27 Feb 2023 17:44)  HR: 126 (27 Feb 2023 17:53) (126 - 126)  BP: 118/76 (27 Feb 2023 17:53) (118/76 - 118/76)  RR: 22 (27 Feb 2023 17:44) (22 - 22)    General: AAOx3, well appearing  Head: Normocephalic atraumatic  Abdomen: soft, gravid nontender, nonperitonitic, diffusely tender to abdomen and back  SVE: 0/0/-3 @ 1730   FHT: 125bpm baseline, moderate variability, + accelerations, no decelerations  Tocometer: q6-8m

## 2023-02-27 NOTE — OB PROVIDER TRIAGE NOTE - ATTENDING COMMENTS
24 yo.  by LMP, 6/10/2022, who presents for full body pain, worst in the abdomen and back that is 10/10 and contractions q5m which spaced after IV fluid and IV Tylenol, no s/s active labor, uterine rupture or placental abruption. Reassuring fetal testing. Discharge to home with strict precautions.

## 2023-02-27 NOTE — OB PROVIDER TRIAGE NOTE - NSOBPROVIDERNOTE_OBGYN_ALL_OB_FT
Patient is a 24 yo.  by LMP, 6/10/2022, who presents for full body pain, worst in the abdomen and back that is 10/10 and contractions q5m starting last night at 20:00.    -VSS  -Cervical exam unchanged after 2 hours of monitoring  -NST reactive  -CBC, Type and screen due to  hx of CS, if need to proceed to OR  -1L IVF bolus  -Ofirmev 1g; will reassess pain    D/w Dr. Mtz Patient is a 24 yo.  by LMP, 6/10/2022, who presents for full body pain, worst in the abdomen and back that is 10/10 and contractions q5m starting last night at 20:00.    -VSS  -Cervical exam unchanged after 2 hours of monitoring  -NST reactive  -CBC, Type and screen due to  hx of CS, if need to proceed to OR  -1L IVF bolus  -Ofirmev 1g; will reassess pain    D/w Dr. Mtz    Update:  Patient still reporting 10/10 constant pain - not incisional - not worsening with ctx  CTX q6m on tocometer  NST reactive  Cervix still closed on exam - not in labor at this time  Continue to monitoring tracing for an additional hour Patient is a 22 yo.  by LMP, 6/10/2022, who presents for full body pain, worst in the abdomen and back that is 10/10 and contractions q5m starting last night at 20:00.    -VSS  -Cervical exam unchanged after 2 hours of monitoring  -NST reactive  -CBC, Type and screen due to  hx of CS, if need to proceed to OR  -1L IVF bolus  -Ofirmev 1g; will reassess pain    D/w Dr. Mtz    Update:  Patient still reporting 10/10 constant pain - not incisional - not worsening with ctx  CTX q6m on tocometer  NST reactive  Cervix still closed on exam - not in labor at this time  Continue to monitoring tracing for an additional hour    Update:  Reviewed FHRT, tocometer, history and physical exam with Dr. Avina. As patient has not made cervical change in the past 5 hours and the FHRT remains reactive and reassuring; the patient is to be discharged home with close follow up on Wednesday at his office. Strict return precautions were reviewed with the patient. Recommended to take tylenol 1g q6h PRN for pain. If the pain worsens and the CTX become q2m patient recommended to return for further evaluation.    Discussed with Dr. Avina and Dr. Mtz

## 2023-02-28 LAB — T PALLIDUM AB TITR SER: NEGATIVE — SIGNIFICANT CHANGE UP

## 2023-03-02 ENCOUNTER — APPOINTMENT (OUTPATIENT)
Dept: OBGYN | Facility: CLINIC | Age: 23
End: 2023-03-02
Payer: MEDICAID

## 2023-03-02 VITALS
BODY MASS INDEX: 30.73 KG/M2 | DIASTOLIC BLOOD PRESSURE: 76 MMHG | WEIGHT: 180 LBS | SYSTOLIC BLOOD PRESSURE: 120 MMHG | HEIGHT: 64 IN

## 2023-03-02 PROCEDURE — 81003 URINALYSIS AUTO W/O SCOPE: CPT | Mod: QW

## 2023-03-02 PROCEDURE — 0502F SUBSEQUENT PRENATAL CARE: CPT

## 2023-03-08 ENCOUNTER — INPATIENT (INPATIENT)
Facility: HOSPITAL | Age: 23
LOS: 2 days | Discharge: HOME | End: 2023-03-11
Attending: OBSTETRICS & GYNECOLOGY | Admitting: OBSTETRICS & GYNECOLOGY
Payer: MEDICAID

## 2023-03-08 ENCOUNTER — TRANSCRIPTION ENCOUNTER (OUTPATIENT)
Age: 23
End: 2023-03-08

## 2023-03-08 DIAGNOSIS — O21.0 MILD HYPEREMESIS GRAVIDARUM: Chronic | ICD-10-CM

## 2023-03-08 DIAGNOSIS — Z98.891 HISTORY OF UTERINE SCAR FROM PREVIOUS SURGERY: Chronic | ICD-10-CM

## 2023-03-08 DIAGNOSIS — O47.1 FALSE LABOR AT OR AFTER 37 COMPLETED WEEKS OF GESTATION: ICD-10-CM

## 2023-03-08 RX ADMIN — SODIUM CHLORIDE 1000 MILLILITER(S): 9 INJECTION, SOLUTION INTRAVENOUS at 23:41

## 2023-03-09 ENCOUNTER — APPOINTMENT (OUTPATIENT)
Dept: OBGYN | Facility: CLINIC | Age: 23
End: 2023-03-09

## 2023-03-09 ENCOUNTER — NON-APPOINTMENT (OUTPATIENT)
Age: 23
End: 2023-03-09

## 2023-03-09 ENCOUNTER — TRANSCRIPTION ENCOUNTER (OUTPATIENT)
Age: 23
End: 2023-03-09

## 2023-03-09 VITALS
DIASTOLIC BLOOD PRESSURE: 61 MMHG | HEART RATE: 71 BPM | TEMPERATURE: 100 F | SYSTOLIC BLOOD PRESSURE: 108 MMHG | RESPIRATION RATE: 20 BRPM

## 2023-03-09 DIAGNOSIS — O34.219 MATERNAL CARE FOR UNSPECIFIED TYPE SCAR FROM PREVIOUS CESAREAN DELIVERY: ICD-10-CM

## 2023-03-09 LAB
ANISOCYTOSIS BLD QL: SLIGHT — SIGNIFICANT CHANGE UP
BASOPHILS # BLD AUTO: 0.16 K/UL — SIGNIFICANT CHANGE UP (ref 0–0.2)
BASOPHILS NFR BLD AUTO: 1.8 % — SIGNIFICANT CHANGE UP (ref 0–2)
BLD GP AB SCN SERPL QL: SIGNIFICANT CHANGE UP
COVID-19 SPIKE DOMAIN AB INTERP: POSITIVE
COVID-19 SPIKE DOMAIN ANTIBODY RESULT: >250 U/ML — HIGH
EOSINOPHIL # BLD AUTO: 0.08 K/UL — SIGNIFICANT CHANGE UP (ref 0–0.5)
EOSINOPHIL NFR BLD AUTO: 0.9 % — SIGNIFICANT CHANGE UP (ref 0–6)
HCT VFR BLD CALC: 32.3 % — LOW (ref 34.5–45)
HGB BLD-MCNC: 9.6 G/DL — LOW (ref 11.5–15.5)
LYMPHOCYTES # BLD AUTO: 1.98 K/UL — SIGNIFICANT CHANGE UP (ref 1–3.3)
LYMPHOCYTES # BLD AUTO: 22.5 % — SIGNIFICANT CHANGE UP (ref 13–44)
MANUAL SMEAR VERIFICATION: SIGNIFICANT CHANGE UP
MCHC RBC-ENTMCNC: 21.2 PG — LOW (ref 27–34)
MCHC RBC-ENTMCNC: 29.7 GM/DL — LOW (ref 32–36)
MCV RBC AUTO: 71.5 FL — LOW (ref 80–100)
MICROCYTES BLD QL: SLIGHT — SIGNIFICANT CHANGE UP
MONOCYTES # BLD AUTO: 1.11 K/UL — HIGH (ref 0–0.9)
MONOCYTES NFR BLD AUTO: 12.6 % — SIGNIFICANT CHANGE UP (ref 2–14)
NEUTROPHILS # BLD AUTO: 5.33 K/UL — SIGNIFICANT CHANGE UP (ref 1.8–7.4)
NEUTROPHILS NFR BLD AUTO: 60.4 % — SIGNIFICANT CHANGE UP (ref 43–77)
PLAT MORPH BLD: NORMAL — SIGNIFICANT CHANGE UP
PLATELET # BLD AUTO: 413 K/UL — HIGH (ref 150–400)
POIKILOCYTOSIS BLD QL AUTO: SLIGHT — SIGNIFICANT CHANGE UP
POLYCHROMASIA BLD QL SMEAR: SIGNIFICANT CHANGE UP
RBC # BLD: 4.52 M/UL — SIGNIFICANT CHANGE UP (ref 3.8–5.2)
RBC # FLD: 26.2 % — HIGH (ref 10.3–14.5)
RBC BLD AUTO: ABNORMAL
SARS-COV-2 IGG+IGM SERPL QL IA: >250 U/ML — HIGH
SARS-COV-2 IGG+IGM SERPL QL IA: POSITIVE
SARS-COV-2 RNA SPEC QL NAA+PROBE: DETECTED
SMUDGE CELLS # BLD: PRESENT — SIGNIFICANT CHANGE UP
T PALLIDUM AB TITR SER: NEGATIVE — SIGNIFICANT CHANGE UP
VARIANT LYMPHS # BLD: 1.8 % — SIGNIFICANT CHANGE UP (ref 0–6)
WBC # BLD: 8.82 K/UL — SIGNIFICANT CHANGE UP (ref 3.8–10.5)
WBC # FLD AUTO: 8.82 K/UL — SIGNIFICANT CHANGE UP (ref 3.8–10.5)

## 2023-03-09 PROCEDURE — 59510 CESAREAN DELIVERY: CPT | Mod: U9

## 2023-03-09 DEVICE — SURGICEL FIBRILLAR 1 X 2": Type: IMPLANTABLE DEVICE | Status: FUNCTIONAL

## 2023-03-09 RX ORDER — IBUPROFEN 200 MG
600 TABLET ORAL EVERY 6 HOURS
Refills: 0 | Status: DISCONTINUED | OUTPATIENT
Start: 2023-03-09 | End: 2023-03-11

## 2023-03-09 RX ORDER — CEFAZOLIN SODIUM 1 G
2000 VIAL (EA) INJECTION ONCE
Refills: 0 | Status: COMPLETED | OUTPATIENT
Start: 2023-03-09 | End: 2023-03-09

## 2023-03-09 RX ORDER — CEFAZOLIN SODIUM 1 G
2000 VIAL (EA) INJECTION ONCE
Refills: 0 | Status: DISCONTINUED | OUTPATIENT
Start: 2023-03-09 | End: 2023-03-09

## 2023-03-09 RX ORDER — TRANEXAMIC ACID 100 MG/ML
1000 INJECTION, SOLUTION INTRAVENOUS ONCE
Refills: 0 | Status: COMPLETED | OUTPATIENT
Start: 2023-03-09 | End: 2023-03-09

## 2023-03-09 RX ORDER — DIPHENHYDRAMINE HCL 50 MG
25 CAPSULE ORAL EVERY 6 HOURS
Refills: 0 | Status: DISCONTINUED | OUTPATIENT
Start: 2023-03-09 | End: 2023-03-11

## 2023-03-09 RX ORDER — METOCLOPRAMIDE HCL 10 MG
10 TABLET ORAL ONCE
Refills: 0 | Status: COMPLETED | OUTPATIENT
Start: 2023-03-09 | End: 2023-03-09

## 2023-03-09 RX ORDER — CITRIC ACID/SODIUM CITRATE 300-500 MG
30 SOLUTION, ORAL ORAL ONCE
Refills: 0 | Status: COMPLETED | OUTPATIENT
Start: 2023-03-09 | End: 2023-03-09

## 2023-03-09 RX ORDER — SODIUM CHLORIDE 9 MG/ML
1000 INJECTION, SOLUTION INTRAVENOUS ONCE
Refills: 0 | Status: COMPLETED | OUTPATIENT
Start: 2023-03-09 | End: 2023-03-08

## 2023-03-09 RX ORDER — ENOXAPARIN SODIUM 100 MG/ML
40 INJECTION SUBCUTANEOUS EVERY 24 HOURS
Refills: 0 | Status: DISCONTINUED | OUTPATIENT
Start: 2023-03-09 | End: 2023-03-11

## 2023-03-09 RX ORDER — ACETAMINOPHEN 500 MG
975 TABLET ORAL
Refills: 0 | Status: DISCONTINUED | OUTPATIENT
Start: 2023-03-09 | End: 2023-03-11

## 2023-03-09 RX ORDER — OXYTOCIN 10 UNIT/ML
333.33 VIAL (ML) INJECTION
Qty: 20 | Refills: 0 | Status: DISCONTINUED | OUTPATIENT
Start: 2023-03-09 | End: 2023-03-11

## 2023-03-09 RX ORDER — KETOROLAC TROMETHAMINE 30 MG/ML
30 SYRINGE (ML) INJECTION EVERY 6 HOURS
Refills: 0 | Status: COMPLETED | OUTPATIENT
Start: 2023-03-09 | End: 2023-03-10

## 2023-03-09 RX ORDER — SIMETHICONE 80 MG/1
80 TABLET, CHEWABLE ORAL EVERY 4 HOURS
Refills: 0 | Status: DISCONTINUED | OUTPATIENT
Start: 2023-03-09 | End: 2023-03-11

## 2023-03-09 RX ORDER — SODIUM CHLORIDE 9 MG/ML
1000 INJECTION, SOLUTION INTRAVENOUS
Refills: 0 | Status: DISCONTINUED | OUTPATIENT
Start: 2023-03-09 | End: 2023-03-11

## 2023-03-09 RX ORDER — MAGNESIUM HYDROXIDE 400 MG/1
30 TABLET, CHEWABLE ORAL
Refills: 0 | Status: DISCONTINUED | OUTPATIENT
Start: 2023-03-09 | End: 2023-03-11

## 2023-03-09 RX ORDER — LANOLIN
1 OINTMENT (GRAM) TOPICAL EVERY 6 HOURS
Refills: 0 | Status: DISCONTINUED | OUTPATIENT
Start: 2023-03-09 | End: 2023-03-11

## 2023-03-09 RX ORDER — CHLORHEXIDINE GLUCONATE 213 G/1000ML
1 SOLUTION TOPICAL ONCE
Refills: 0 | Status: COMPLETED | OUTPATIENT
Start: 2023-03-09 | End: 2023-03-09

## 2023-03-09 RX ORDER — ONDANSETRON 8 MG/1
4 TABLET, FILM COATED ORAL ONCE
Refills: 0 | Status: COMPLETED | OUTPATIENT
Start: 2023-03-09 | End: 2023-03-09

## 2023-03-09 RX ORDER — SODIUM CHLORIDE 9 MG/ML
1000 INJECTION, SOLUTION INTRAVENOUS
Refills: 0 | Status: DISCONTINUED | OUTPATIENT
Start: 2023-03-09 | End: 2023-03-09

## 2023-03-09 RX ORDER — IBUPROFEN 200 MG
600 TABLET ORAL EVERY 6 HOURS
Refills: 0 | Status: COMPLETED | OUTPATIENT
Start: 2023-03-09 | End: 2024-02-05

## 2023-03-09 RX ORDER — ACETAMINOPHEN 500 MG
1000 TABLET ORAL ONCE
Refills: 0 | Status: COMPLETED | OUTPATIENT
Start: 2023-03-09 | End: 2023-03-09

## 2023-03-09 RX ORDER — FAMOTIDINE 10 MG/ML
20 INJECTION INTRAVENOUS ONCE
Refills: 0 | Status: COMPLETED | OUTPATIENT
Start: 2023-03-09 | End: 2023-03-09

## 2023-03-09 RX ORDER — TETANUS TOXOID, REDUCED DIPHTHERIA TOXOID AND ACELLULAR PERTUSSIS VACCINE, ADSORBED 5; 2.5; 8; 8; 2.5 [IU]/.5ML; [IU]/.5ML; UG/.5ML; UG/.5ML; UG/.5ML
0.5 SUSPENSION INTRAMUSCULAR ONCE
Refills: 0 | Status: DISCONTINUED | OUTPATIENT
Start: 2023-03-09 | End: 2023-03-11

## 2023-03-09 RX ADMIN — ENOXAPARIN SODIUM 40 MILLIGRAM(S): 100 INJECTION SUBCUTANEOUS at 18:07

## 2023-03-09 RX ADMIN — SODIUM CHLORIDE 125 MILLILITER(S): 9 INJECTION, SOLUTION INTRAVENOUS at 06:02

## 2023-03-09 RX ADMIN — TRANEXAMIC ACID 220 MILLIGRAM(S): 100 INJECTION, SOLUTION INTRAVENOUS at 03:28

## 2023-03-09 RX ADMIN — Medication 975 MILLIGRAM(S): at 18:28

## 2023-03-09 RX ADMIN — Medication 30 MILLIGRAM(S): at 04:25

## 2023-03-09 RX ADMIN — FAMOTIDINE 20 MILLIGRAM(S): 10 INJECTION INTRAVENOUS at 02:50

## 2023-03-09 RX ADMIN — Medication 10 MILLIGRAM(S): at 13:53

## 2023-03-09 RX ADMIN — Medication 30 MILLIGRAM(S): at 13:53

## 2023-03-09 RX ADMIN — SODIUM CHLORIDE 125 MILLILITER(S): 9 INJECTION, SOLUTION INTRAVENOUS at 13:53

## 2023-03-09 RX ADMIN — CHLORHEXIDINE GLUCONATE 1 APPLICATION(S): 213 SOLUTION TOPICAL at 02:45

## 2023-03-09 RX ADMIN — Medication 400 MILLIGRAM(S): at 07:50

## 2023-03-09 RX ADMIN — Medication 1000 MILLIUNIT(S)/MIN: at 04:19

## 2023-03-09 RX ADMIN — ONDANSETRON 4 MILLIGRAM(S): 8 TABLET, FILM COATED ORAL at 02:45

## 2023-03-09 RX ADMIN — Medication 30 MILLIGRAM(S): at 22:46

## 2023-03-09 RX ADMIN — Medication 30 MILLILITER(S): at 02:50

## 2023-03-09 RX ADMIN — Medication 1000 MILLIGRAM(S): at 08:15

## 2023-03-09 RX ADMIN — Medication 30 MILLIGRAM(S): at 07:30

## 2023-03-09 RX ADMIN — Medication 2000 MILLIGRAM(S): at 03:28

## 2023-03-09 RX ADMIN — Medication 30 MILLIGRAM(S): at 21:15

## 2023-03-09 RX ADMIN — ONDANSETRON 4 MILLIGRAM(S): 8 TABLET, FILM COATED ORAL at 07:50

## 2023-03-09 NOTE — OB RN PATIENT PROFILE - FUNCTIONAL ASSESSMENT - BASIC MOBILITY 6.
4-calculated by average/Not able to assess (calculate score using [facility] averaging method)

## 2023-03-09 NOTE — OB PROVIDER TRIAGE NOTE - NSHPPHYSICALEXAM_GEN_ALL_CORE
Vitals:   T(C): 38 (03-09-23 @ 00:02), Max: 38 (03-09-23 @ 00:02)  T(F): 100.4 (03-09-23 @ 00:02), Max: 100.4 (03-09-23 @ 00:02)  HR: 71 (03-09-23 @ 00:02) (71 - 71)  BP: 108/61 (03-09-23 @ 00:02) (108/61 - 108/61)  RR: 20 (03-09-23 @ 00:02) (20 - 20)    General: AAOx3, NAD  Abd: Soft, nontender, gravid  SVE: Closed cervix    BMI (kg/m2): 29.2 (01-21-23), 29.2 (01-17-23), 28.3 (01-10-23)    FHT: Baseline 120, reactive tracing  [place]:  Contractions every 4-7 min    MFM sono:   2/17/2023: The estimated fetal weight is suggestive of a large size   for gestational age fetus measuring greater than the   90th percentile. The fetal AC measurement is at the   98th percentile. Good fetal activity is seen and the   amniotic fluid is normal. Limited fetal anatomy due to   gestational age and fetal position.  Fetal testing is   reasurring. BPP score is 8/8. Left renal pyelecatasis is   noted measureing 9.1mm.      Bedside sono:   Presentation: Vertex  Placenta: posterior

## 2023-03-09 NOTE — DISCHARGE NOTE OB - PLAN OF CARE
Patient underwent a  delivery. Post-op course was uncomplicated. Pain is well controlled with PRN medication. She has no difficulty with ambulation, voiding, or PO intake. Lab values and vital signs are within normal limits prior to discharge.  1) Please take acetaminophen and ibuprofen as needed for pain as prescribed.  2) Nothing in the vagina for 6 weeks (including no sex, no tampons, and no douching).  3) Please call your doctor for a follow up postpartum appointment in 4-6 weeks.  4) Please continue taking vitamins postpartum. Take iron and colace for acute blood loss anemia.  5) Please call the office sooner if you have heavy vaginal bleeding, severe abdominal pain, or fever > 100.4F.  6) You may resume regular daily activity as tolerated

## 2023-03-09 NOTE — OB RN INTRAOPERATIVE NOTE - NSSELHIDDEN_OBGYN_ALL_OB_FT
[[email or web address]_ALL_OB_FT:[email or web address]],[NS_DeliveryAssist1_OBGYN_ALL_OB_FT:[id number]],[NS_DeliveryRN_OBGYN_ALL_OB_FT:[id number]]

## 2023-03-09 NOTE — OB RN PATIENT PROFILE - FALL HARM RISK - UNIVERSAL INTERVENTIONS
Bed in lowest position, wheels locked, appropriate side rails in place/Call bell, personal items and telephone in reach/Instruct patient to call for assistance before getting out of bed or chair/Non-slip footwear when patient is out of bed/[place] to call system/Physically safe environment - no spills, clutter or unnecessary equipment/Purposeful Proactive Rounding/Room/bathroom lighting operational, light cord in reach

## 2023-03-09 NOTE — DISCHARGE NOTE OB - CARE PROVIDER_API CALL
Maksim Avina (DO)  Obstetrics and Gynecology  [address] [facility], [address] [place] [address]  Phone: [phone]  Fax: [phone]  Follow Up Time: 2 weeks

## 2023-03-09 NOTE — OB PROVIDER H&P - ASSESSMENT
A/P    Patient is a 24 yo.  by LMP, 6/10/2022 at 38w6d who presents to L&D BIBEMS for abdominal pain, contractions q5 min since 4pm. No cervical dilation. History of similar pain caused by uterine fibroid degeneration.    Admit for rCS due to intractable pain  - Large for gestational age fetus    - Admission Labs  - Fluids: 2L LR Bolus  - Zofran  - Monitor FHR and [place] A/P    Patient is a 22 yo.  by LMP, 6/10/2022 at 38w6d [facility] q5 min since 4pm. No cervical dilation. History of similar pain caused by uterine fibroid degeneration.    Admit for rCS due to intractable pain / labor  - Large for gestational age fetus    - Admission Labs  - Fluids: 2L LR Bolus  - Zofran  - Monitor FHR and [place]    Addendum:    Risks, benefits, and alternatives of  section discussed at length, including risk of injury to adjacent organs, such as the bladder, bowel, and bilateral ureters, risk of infection, and risk of hemorrhage, which may lead to subsequent intervention and possible blood transfusion.  She has no Muslim or personal objection to blood transfusion, if necessary.  She was given the opportunity to ask questions and all were addressed.  She understands the plan of care.    Admit to L&D  Vital Signs per Unit Protocol  Admission Laboratory Panel  IVF LR @ 125cc/hr  TAUS for fetal presentation and placental location  TXA prior to incision  PRBCs x2 Units on Hold  Ancef on Call to OR  Bicitra 30 min prior to OR    Alfredo Sanchez DO

## 2023-03-09 NOTE — OB RN DELIVERY SUMMARY - NS_SEPSISRSKCALC_OBGYN_ALL_OB_FT
No temperature has been documented for this patient in CPN or on the OB Flowsheet. Ensure the highest temperature during labor was documented on the OB Flowsheet.  No gestational age at birth has been documented. Ensure delivery date/time has been entered above.  Rupture of membranes must be entered above.   EOS calculated successfully. EOS Risk Factor: 0.5/1000 live births ([facility] national incidence); GA=38w6d; Temp=100.4; ROM=0.017; GBS='Positive'; Antibiotics='No antibiotics or any antibiotics < 2 hrs prior to birth'

## 2023-03-09 NOTE — OB PROVIDER DELIVERY SUMMARY - NSPROVIDERDELIVERYNOTE_OBGYN_ALL_OB_FT
Patient was taken to the OR, a repeat low transverse  section was performed and a viable female  infant was delivered atraumatically in OP presentation at 0405. Placenta delivered at 0406. The hysterotomy was reapproximated with 0-monocryl in a continuous locked fashion in 2 layeres. The peritoneum was reapproximated with 2-0 vicryl in a continuous fashion. The fascia was reapproximated with 0-vicyrl in a continuous fashion. The subcutaneous tissue was reapproximated with 2-0 plain. The skin was reapproximated with 3-0 vicryl in continuous fashion. Excellent hemostasis was obtained at each layer of closure.  Apgars 9,9. Weight 3570g, 7lbs 14 oz.   IVF 1400   Patient was taken to the OR, a repeat low transverse  section was performed and a viable female  infant was delivered atraumatically in OP presentation at 0405. Placenta delivered at 0406. The hysterotomy was reapproximated with 0-monocryl in a continuous locked fashion in 2 layers. Snow was placed on the hysterotomy. The peritoneum was reapproximated with 3-0 vicryl in a continuous fashion. The fascia was reapproximated with 0-vicyrl in a continuous fashion. The subcutaneous tissue was reapproximated with 2-0 plain. The skin was reapproximated with 3-0 vicryl in continuous fashion. Excellent hemostasis was obtained at each layer of closure.  Apgars 9,9. Weight 3570g, 7lbs 14 oz.   IVF 1400

## 2023-03-09 NOTE — OB PROVIDER TRIAGE NOTE - HISTORY OF PRESENT ILLNESS
Patient is a 24 yo.  by LMP, 6/10/2022 at 38w6d who presents to L&D BIBEMS for abdominal pain, contractions q5 min since 4pm. Took 1000mg Tylenol an hour PTA with no relief. Also endorses 6x loose stools today and nausea. Denies fever, chills, vomiting, headache, vision changes. Denies any vaginal bleeding, leakage of fluid. Reports normal fetal movement.     Pregnancy course is significant for:  Anemia requiring blood transfusions (2U PRBC )  History of PEC in previous pregnancy  Asthma  Fibroid uterus  Hyperemesis   contractions without cervical change requiring admission    POB:   G1: pCS (2020) 38w secondary to non reassuring FHT, admitted initially with ctx PEC  G2:  top  PGYN: +fibroids/-cysts, denies STD hx, hx LSIL on Pap  PMH: Kidney stones  PSH: C/S  SH: Denies tobacco use, EtOH use and illicit drug use during the pregnancy; Feels safe at home  Meds: Doxylamine-pyridoxine, ASA, Zofran  All: oxycodone, morphine

## 2023-03-09 NOTE — OB PROVIDER H&P - TERM DELIVERIES, OB PROFILE
1
22-year-old male no PMHx presenting with sore throat x 5 days, likely viral pharyngitis, no concerning features. COVID swab ordered. Discharged.

## 2023-03-09 NOTE — DISCHARGE NOTE OB - MEDICATION SUMMARY - MEDICATIONS TO STOP TAKING
I will STOP taking the medications listed below when I get home from the hospital:    Aspirin Low Dose 81 mg oral tablet, chewable  -- 1 tab(s) by mouth once a day    metroNIDAZOLE 500 mg oral tablet  -- 1 tab(s) by mouth 2 times a day    metroNIDAZOLE 500 mg oral tablet  -- 1 tab(s) by mouth every 12 hours   -- Do not drink alcoholic beverages when taking this medication.  Finish all this medication unless otherwise directed by prescriber.  May discolor urine or feces.

## 2023-03-09 NOTE — OB PROVIDER H&P - NSLOWPPHRISK_OBGYN_A_OB
Joiner Pregnancy/Less than or equal to 4 previous vaginal births/No history of postpartum hemorrhage

## 2023-03-09 NOTE — OB PROVIDER H&P - NSHPPHYSICALEXAM_GEN_ALL_CORE
Vitals:   T(C): 38 (03-09-23 @ 00:02), Max: 38 (03-09-23 @ 00:02)  T(F): 100.4 (03-09-23 @ 00:02), Max: 100.4 (03-09-23 @ 00:02)  HR: 71 (03-09-23 @ 00:02) (71 - 71)  BP: 108/61 (03-09-23 @ 00:02) (108/61 - 108/61)  RR: 20 (03-09-23 @ 00:02) (20 - 20)    General: AAOx3, NAD  Abd: Soft, nontender, gravid  SVE: Closed cervix    BMI (kg/m2): 29.2 (01-21-23), 29.2 (01-17-23), 28.3 (01-10-23)    FHT: Baseline 120, reactive tracing  [place]:  Contractions every 4-7 min    MFM sono:   2/17/2023: The estimated fetal weight is suggestive of a large size   for gestational age fetus measuring greater than the   90th percentile. The fetal AC measurement is at the   98th percentile. Good fetal activity is seen and the   amniotic fluid is normal. Limited fetal anatomy due to   gestational age and fetal position.  Fetal testing is   reasurring. BPP score is 8/8. Left renal pyelecatasis is   noted measureing 9.1mm.

## 2023-03-09 NOTE — DISCHARGE NOTE OB - NS MD DC FALL RISK RISK
For information on Fall & Injury Prevention, visit: [email or web address] OR  [email or web address] OR  https://www.cdc.gov/steadi/patient.html

## 2023-03-09 NOTE — PROGRESS NOTE ADULT - SUBJECTIVE AND OBJECTIVE BOX
23y Female s/p c section under spinal anesthesia with duramorph for post op analgesia on 03/09/2023    Vital Signs Last 24 Hrs    T(C): 36.8 (09 Mar 2023 13:35), Max: 38 (09 Mar 2023 00:02)  T(F): 98.3 (09 Mar 2023 13:35), Max: 100.4 (09 Mar 2023 00:02)  HR: 63 (09 Mar 2023 13:35) (59 - 94)  BP: 127/82 (09 Mar 2023 13:35) (108/61 - 153/100)  BP(mean): --  RR: 18 (09 Mar 2023 13:35) (17 - 28)  SpO2: 100% (09 Mar 2023 13:35) (99% - 100%)    Parameters below as of 09 Mar 2023 13:35  Patient On (Oxygen Delivery Method): room air            Patient's overall anesthesia satisfaction: Positive    Patients pain is well controlled    No pruritis at this time    Patient seen and doing well     No headache      No residual numbness or weakness, sensory and motor function intact    Site examined and ......    No anesthetic complications or complaints noted or reported          .

## 2023-03-09 NOTE — OB PROVIDER DELIVERY SUMMARY - NSSELHIDDEN_OBGYN_ALL_OB_FT
[NS_[email or web address]_ALL_OB_FT:[email or web address]=],[NS_DeliveryAssist1_OBGYN_ALL_OB_FT:[id number]=],[NS_DeliveryRN_OBGYN_ALL_OB_FT:[id number]=]

## 2023-03-09 NOTE — DISCHARGE NOTE OB - CARE PLAN
Principal Discharge DX:	S/P  section  Assessment and plan of treatment:	Patient underwent a  delivery. Post-op course was uncomplicated. Pain is well controlled with PRN medication. She has no difficulty with ambulation, voiding, or PO intake. Lab values and vital signs are within normal limits prior to discharge.  1) Please take acetaminophen and ibuprofen as needed for pain as prescribed.  2) Nothing in the vagina for 6 weeks (including no sex, no tampons, and no douching).  3) Please call your doctor for a follow up postpartum appointment in 4-6 weeks.  4) Please continue taking vitamins postpartum. Take iron and colace for acute blood loss anemia.  5) Please call the office sooner if you have heavy vaginal bleeding, severe abdominal pain, or fever > 100.4F.  6) You may resume regular daily activity as tolerated   1

## 2023-03-09 NOTE — DISCHARGE NOTE OB - PATIENT PORTAL LINK FT
You can access the FollowMyHealth Patient Portal offered by [facility] by registering at the following website: [email or web address]. By joining [email or web address] FollowMyHealth portal, you will also be able to view your health information using other applications (apps) compatible with our system.

## 2023-03-09 NOTE — OB RN DELIVERY SUMMARY - NSSELHIDDEN_OBGYN_ALL_OB_FT
[NS_DeliveryAttending1_OBGYN_ALL_OB_FT:[email or web address]=],[NS_DeliveryAssist1_OBGYN_ALL_OB_FT:[id number]=],[NS_DeliveryRN_OBGYN_ALL_OB_FT:[id number]=]

## 2023-03-09 NOTE — OB RN PATIENT PROFILE - AS PAIN REST
[facility] Daily Progress Note    Patient: Charlene Rangel Date: 2021  : 1937 Attending: Keanu Villagomez MD  83 year old female     Chief Complaint: Delirium    Functional Decline    Transitions of Care      Subjective: Mentation better today. Did not get AM morphine. DTR Brooklynn at bedside states pt has recognized family and is interactive with them.  PO intake is poor though. Pt c/o b/l leg pain and asking for pain pill. Has not received any PRNs for days.      Review of Systems:  Unable to perform review of systems due to  mental status changes    PAST MEDICAL HISTORY, PAST SURGICAL HISTORY, FAMILY HISTORY, SOCIAL HISTORY:  Reviewed in EPIC.     Medications/Infusions:    Scheduled:   • morphine SR  15 mg Oral QHS   • CARBOXYMethylcellulose  1 drop Both Eyes TID   • gabapentin  200 mg Oral 2 times per day   • docusate sodium-sennosides  2 tablet Oral Nightly   • pantoprazole  40 mg Oral Nightly   • polyethylene glycol  17 g Oral Daily   • sodium chloride (PF)  2 mL Intracatheter 2 times per day   • guaiFENesin  1,200 mg Oral 2 times per day   • budesonide-formoterol  2 puff Inhalation BID Resp   • albuterol-ipratropium  1 puff Inhalation Q6H Resp   • allopurinol  300 mg Oral Daily   • apixaBAN  5 mg Oral 2 times per day   • aspirin  81 mg Oral Daily   • dilTIAZem  120 mg Oral Daily        Continuous Infusions:     Physical Exam:     Vital 24 Hour Range Most Recent Value   Temperature Temp  Min: 98 °F (36.7 °C)  Max: 99 °F (37.2 °C) 99 °F (37.2 °C) (21)   Pulse Pulse  Min: 70  Max: 73 73 (21)   Respiratory Resp  Min: 16  Max: 18 18 (21)   Non-Invasive  Blood Pressure BP  Min: 136/63  Max: 140/63 136/63 (21)   Pulse Oximetry SpO2  Min: 94 %  Max: 97 % 94 % (21)   Arterial  Blood Pressure No data recorded     O2 O2 Flow Rate (L/min)  Av L/min  Min: 2 L/min   Min taken time: 21 1208  Max: 2 L/min   Max taken time: 21  1208 2 L/min (08/20/21 1208)     Vital Most Recent Value First Value   Weight 88.6 kg (08/18/21 0500) Weight: 90.9 kg (08/03/21 1852)   Height 4' 10\" (147.3 cm) (08/04/21 1243) Height: 4' 10\" (147.3 cm) (08/03/21 1852)   BMI 40.84 (08/18/21 0500) N/A     Last Stool Occurrence: 1 (08/20/21 0800)  I/O:  08/19 0700 - 08/20 0659  In: -   Out: 900 [Urine:900]    GENERAL:  Awake, alert.  No acute pain or distress. Lying down in bed.  MENTAL STATUS:  Aox1, speech fluent, but nonsensical.    Labs:  Lab Results   Component Value Date    SODIUM 129 (L) 08/10/2021    POTASSIUM 3.5 08/10/2021    CHLORIDE 95 (L) 08/10/2021    CO2 30 08/10/2021    ANIONGAP 8 (L) 08/10/2021    BUN 15 08/10/2021    CREATININE 0.64 08/10/2021    WBC 17.3 (H) 08/10/2021    RBC 3.31 (L) 08/10/2021    HCT 32.1 (L) 08/10/2021    HGB 10.4 (L) 08/10/2021     08/10/2021    GLUCOSE 91 08/10/2021    TSH 1.364 10/30/2018    VB12 272 07/15/2014    CAMMY 18.9 07/15/2014     No results available in last 24 hoursNo results found    Invalid input(s): GFR, GFRAA, ALKPHOS  Lab Results   Component Value Date    BNP 21 07/14/2014    TSH 1.364 10/30/2018    TSH 1.214 07/14/2014    VB12 272 07/15/2014    CAMMY 18.9 07/15/2014    EMMANUEL 1.16 (L) 07/15/2014    URIC 5.1 07/15/2014    RESR 11 10/30/2018    CRP <0.3 10/30/2018    ANABL NEGATIVE 10/30/2018     Blood Cx:    Results for orders placed or performed during the hospital encounter of 07/14/14   Blood Culture    Specimen: Blood   Result Value Ref Range    Specimen Description BLOOD, PERIPHERAL HAND RIGHT     CULTURE NO GROWTH 5 DAYS.     REPORT STATUS 07/20/2014 FINAL      Urine Cx:  No results found for this or any previous visit.    EKG:    Results for orders placed or performed during the hospital encounter of 08/03/21   ECG   Result Value Ref Range    Ventricular Rate EKG/Min (BPM) 82     Atrial Rate (BPM) 81     QRS-Interval (MSEC) 142     QT-Interval (MSEC) 384     QTc 448     R Axis (Degrees) -59     T Axis  (Degrees) 51     REPORT TEXT       Atrial fibrillation  with premature ventricular or aberrantly conducted complexes  Right bundle branch block  Left anterior fascicular block   Bifascicular block  Moderate voltage criteria for LVH, may be normal variant  Abnormal ECG  When compared with ECG of  27-SEP-2018 11:21,  Atrial fibrillation  has replaced  Sinus rhythm  Borderline criteria for  Lateral infarct  are no longer  present  QT has shortened  Confirmed by MD ARRINGTON SARA ([id number]),  Margie Cooley ([id number]) on 8/10/2021 12:37:57 PM       Imaging:  XR Chest AP or PA    Result Date: 8/10/2021  Narrative: XR CHEST AP OR PA CLINICAL INDICATION: post lung bx. TECHNIQUE: Portable frontal semi-upright view of the chest obtained on 8/10/2021 3:39 PM. COMPARISON: Chest radiograph on August 3, 2021.. FINDINGS: Stable cardiac enlargement. Stable central pulmonary prominence. Atherosclerotic calcifications of the thoracic aorta with ectasia. Low lung volumes. Prominent interstitial markings partly attributed to the low lung volumes and portable technique. Minimal opacities identified at the left upper lung in the region of patient's known pulmonary mass. Minimal opacities of the right lower lung at the medial aspect of the lung. Minimal blunting of the right costophrenic sulcus. Left costophrenic sulcus is sharp. No evident pneumothorax.     Impression: IMPRESSION: Post right lung biopsy without evident complication such as pneumothorax.     XR CHEST AP OR PA - PORTABLE    Result Date: 8/3/2021  Narrative: XR CHEST AP OR PA 8/3/2021 3:44 PM HISTORY:  83 years-old Female with presenting history of generalized weakness, history of invasive ductal carcinoma of the breast. COMPARISON: Multiple prior radiographs of the chest, most recently from 9/27/2018 TECHNIQUE:  Single, AP upright view of the chest. FINDINGS: Right upper quadrant surgical clips. The cardiomediastinal contour and pulmonary vasculature are within normal  limits. New 15 mm nodular opacity projects over the left upper lobe. No focal consolidation. No definite pleural effusion. No pneumothorax.     Impression: IMPRESSION: New 15 mm nodular opacity projects over the superior left lung. Given history of invasive breast cancer, recommend further evaluation with contrast-enhanced chest CT to exclude new underlying lesion. I, Attending Radiologist Lawrence Dubin, MD, have reviewed the images and report and concur with these findings interpreted by Resident Radiologist, Thee Mathews MD.     IR Lung Biopsy    Result Date: 8/10/2021  Narrative: CLINICAL HISTORY:  83 years -year-old  Female  presents with a right  lung mass.  Cancer is suspected needs tissue diagnosis. PROCEDURE PERFORMED: CT-guided Core Biopsy of Lung CONSENT:    The risks, benefits and alternatives to the procedure were explained to the patient and informed written consent was obtained. SEDATION: Sedation was provided by the Anesthesiology Dept.  TECHNIQUE: The previous imaging was reviewed and the patient was placed on the CT table in the prone position.  A 19-gauge introducer needle was then advanced into the lesion under Computed Tomography guidance after infiltration of the skin and deep tissues with local anesthetic. CORE BIOPSY: Using a coaxial technique 6   20-gauge core biopsies were performed and given to the pathologist. The introducer needle was then removed and direct manual pressure was applied over the skin entrance site. Immediate follow-up imaging of the lung showed no pneumothorax. The patient tolerated the procedure well with no immediate complications.     Impression: IMPRESSION: CT-guided core biopsy right lung mass.     XR Hip 2-3 View LEFT and Pelvis, XR Lumbar Spine 2 or 3 Views    Result Date: 8/3/2021  Narrative: XR HIP 2-3 VIEW LEFT AND PELVIS, XR LUMBAR SPINE 2 OR 3 VIEWS 8/3/2021 3:45 PM HISTORY:  83 years-old Female with presenting history of left hip pain radiating down lower  extremity, generalized weakness. COMPARISON: No direct comparisons available. Report of CT of the abdomen and pelvis performed 10/14/2019 at outside facility available in Care Everywhere without accompanying images. TECHNIQUE:  2 views of the left hip. Single frontal view of the pelvis. Three views of the lumbar spine. FINDINGS: Pelvis/Hip: No acute fracture or dislocation. There are degenerative osteoarthritic changes with marginal osteophyte formation, subchondral sclerosis/cystic changes, and joint space narrowing. Numerous pelvic phleboliths. Bilateral iliac stents. Lumbar spine: Five lumbar-type vertebral bodies.  No acute osseous abnormality. Preserved vertebral body heights. Mild anterolisthesis of L3 on L4 and L4 on L5.  Multilevel degenerative disc height loss throughout the visualized thoracolumbar spine and lower lumbar facet arthropathy, most pronounced at L4-S1.     Impression: IMPRESSION: 1.  No acute osseous findings in the pelvis, or left hip. 2.  Osteoarthritic degenerative changes in left hip. 3.  Multilevel degenerative spondylosis in the lower lumbar spine, as above. Mild anterolisthesis of L3 on L4 and L4 on L5 I, Attending Radiologist Lawrence Dubin, MD, have reviewed the images and report and concur with these findings interpreted by Resident Radiologist, Thee Mathews MD.     MRI Sacrum / Coccyx    Result Date: 8/8/2021  Narrative: EXAM: MRI SACRUM / COCCYX W WO CONTRAST HISTORY:  pelvic pain. COMPARISONS:  08/03/2021. And bone scan obtained on 08/05/2021 TECHNIQUE: Multiplanar multisequence imaging of the sacrum and coccyx with and without contrast. Postcontrast sequences are obtained after intravenous administration of 9 cc of Gadavist FINDINGS:  Large destructive bone lesion involving the left iliac wing and acetabulum is at the edge of field of coverage. This is not a dedicated pelvic or left hip MRI. This MRI was dedicated for the sacrum and coccyx. There is T2 hyperintensity and mild  enhancement at the edge of field of coverage, which is likely component of the suspected large metastasis knee seen on prior studies. Dedicated pelvic MRI with and without contrast is recommended for further evaluation. Pleural fluid at the sacroiliac joints, probably physiologic. No marrow signal abnormality involving the sacrum or the coccyx. Presacral edema is noted. Large area of T2 hyperintensity within the right pelvis at the edge of field of coverage and series 11 image 18 is probably a large renal cyst better seen on recent lumbar MRI. Perineural sleeve cyst involving S3 on series 11 image 5 and series 15 image 5. Rectal wall thickening is suspected. Significant stool within the rectal vault.     Impression: IMPRESSION: 1. No definitive abnormality involving the sacrum or coccyx. Osteoarthritis of the sacroiliac joints. Presacral edema is nonspecific. 2. Large destructive suspected mass at the left iliac wing/acetabulum is at the edge of field of coverage. Dedicated pelvic MRI with and without contrast is recommended. 3. Additional findings as described.    CT CHEST WO CONTRAST    Result Date: 8/4/2021  Narrative: EXAM: CT CHEST WO CONTRAST CLINICAL: Abnormal chest x-ray in a patient admitted for leg pain. PROTOCOL: Axial CT images of the chest were obtained without the injection of intravenous contrast. Scanning was performed from the thoracic inlet to the upper abdomen. 3D MIP images and coronal and sagittal reformations were also provided for interpretation. COMPARISON: Chest radiograph dated August 3, 2021 FINDINGS: There is a 13 x 14 mm noncalcified nodule in the posterior left upper lobe (3:33), corresponding to the radiographically appreciated opacity. Lung windows are mildly degraded by respiratory motion. There is discoid atelectasis in the lung bases. Pneumatocele in the right middle lobe (3:72). Pleural-based mass in the superior right lower lobe measures 37 x 28 mm (3:59). The lungs are otherwise  clear. Mild cardiomegaly. No pericardial or pleural effusion. Thoracic aorta arteriosclerosis and mild ectasia with normal caliber. Right hilar fullness, strongly suggesting lymphadenopathy. In the absence of intravenous contrast, lymph nodes are nonmeasurable of dislocation. No other lymphadenopathy is evident in the chest. Moderate to marked kyphosis with multilevel spondylosis. No osseous destructive lesions. Visualized upper abdomen is remarkable for calcified hepatic granuloma, surgical absence of the gallbladder, and under rotation of the right kidney. Upper abdominal images are moderately degraded by motion artifact.     Impression: IMPRESSION: Bilateral lung masses with probable right hilar lymphadenopathy. PET/CT is recommended for further evaluation.     CT LUMBAR SPINE WO CONTRAST    Result Date: 8/3/2021  Narrative: CT LUMBAR SPINE WITHOUT CONTRAST CLINICAL INDICATION: Intervertebral disc degeneration. COMPARISON:  X-ray lumbar spine dated 8/3/2021. CT abdomen and pelvis dated March 8, 2017. TECHNIQUE:  Using a multidetector multislice helical CT scanner, CT images were obtained through the lumbar spine without contrast.  Coronal and sagittal reformats were generated and reviewed. FINDINGS: Numbering:  Five lumbar-type vertebral bodies with transitional partially sacralized L5 segment. There is a 5 mm anterolisthesis of L3 relative to L4 and 4 mm anterolisthesis of L5 relative to S1. There are multilevel degenerative changes involving the visualized lower thoracic and lumbar spine with vacuum disc phenomenon seen at T10-T11, L1-L2, L3-L4. The bones are osteopenic. There is no suspicious osseous lesion. There is a Schmorl's node along the inferior endplate of T10. There is mild loss of height at L2. The remaining vertebral body heights are maintained. There is slight dextrocurvature of the thoracic lumbar spine. Degenerative changes: T12-L1: No significant abnormality. L1-L2: Minimal symmetric disc bulge  without significant spinal canal stenosis.  Mild bilateral foraminal narrowing due to disc bulge and facet arthropathy. L2-L3: Disc bulge slightly asymmetric to the right and thick ligamenta flava cause mild spinal canal narrowing.  Moderate bilateral foraminal stenosis due to disc bulge and facet arthropathy. L3-L4: Disc bulge asymmetric to left and thick ligamenta flava cause probable moderate spinal canal stenosis.  Mild to moderate right and severe left foraminal stenosis due to disc bulge and facet arthropathy. L4-L5: Minimal symmetric disc bulge without significant spinal canal stenosis.  Moderate to marked bilateral foraminal stenosis due to disc bulge and facet arthropathy. L5-S1: No significant abnormality. There are degenerative changes involving the bilateral sacroiliac joints. There is the suggestion of a cyst exophytic to the upper pole of the left kidney. There is calcification of the abdominal aorta. There are bilateral stents in the external/common iliac veins which terminate in the proximal IVC. Multiple scattered pelvic phleboliths. There is a partially calcified 2 cm lesion in the left pelvis (series 2, image 107), which appears mildly increased in size in comparison to the prior study dated March 8, 2007. There are large presumed collateral vessels to the left of the abdominal aorta (series 2, images 32 through 60).     Impression: IMPRESSION: Mild L2 compression deformity. Multilevel degenerative changes as detailed above results in up to moderate spinal canal stenosis at L3-L4. Degenerative changes result in up to severe neural foraminal stenosis on the left at L3-L4 with probable compression of the exiting left L3 nerve root. MRI of the lumbar spine may be obtained for further evaluation as clinically warranted. Please see above for additional findings.     MRI LUMBAR SPINE W WO  CONTRAST    Result Date: 8/6/2021  Narrative: MRI LUMBAR SPINE W WO  CONTRAST HISTORY: Bone neoplasm, L/S-spine,  recurrence suspected, abnormal CT r/o cord compression COMPARISON: CT August 3, 2021 and bone scan August 5, 2021. TECHNIQUE: Multiplanar multisequence MRI imaging is performed pre-and post-intravenous administration of 9 cc  Gadavist. FINDINGS: For the purposes of this exam 5 nonrib-bearing lumbar type vertebral bodies are assumed.There are small T12 ribs and L5 is partially sacralized.There is no acute appearing compression fracture, pathologic intrathecal enhancement, or pathologic appearing marrow signal. The conus tip is at L1/L2. Left acetabular pathology is at the margin of this exam and not completely assessed. See separate sacral report. T12/L1: Mild generalized disc bulging and mild facet arthropathy. L1/L2: Moderate generalized disc bulging and facet arthropathy. There is mild bilateral foraminal narrowing. L2/L3: Moderate facet arthropathy and generalized disc bulging. There is mild foraminal narrowing. L3/L4: Severe facet arthropathy with mild anterolisthesis and disc bulging. There is moderate left and mild right-sided foraminal narrowing. L4/L5: Severe facet arthropathy and mild disc bulging. There is mild left and moderate right foraminal narrowing. L5/S1: Segmentation anomaly with rudimentary appearing disc.     Impression: IMPRESSION: No acute compression deformity or pathologic lumbar spine marrow. Variable spondylosis. See report.     CT Hip Left    Result Date: 8/3/2021  Narrative: EXAM: CT HIP WO CONTRAST LEFT HISTORY:  Hip pain, chronic, articular cartilage eval, xray done. COMPARISONS:  Plain film obtained on 08/03/2021 and CT obtained on 08/14/2014. TECHNIQUE: 0.5 mm thick axial images through the left hip along with coronal and sagittal reconstructions. FINDINGS:  Large destructive bone lesion is noted involving the left iliac neck extending into the left acetabulum. Pathologic fracture is present extending into the anterior inferior iliac spine. The soft tissue mass measures approximately  5 cm x 4 cm.     Impression: IMPRESSION: 1. Large destructive lesion involving the left iliac neck extending into the left acetabulum with pathologic fracture extending into the anterior inferior iliac spine. Correlate clinically to determine if there is known malignancy resulting in metastatic disease. Biopsy can be obtained.    CT HEAD WO CONTRAST    Result Date: 8/18/2021  Narrative: CT HEAD WITHOUT IV CONTRAST INDICATION:  Mental status change, unknown cause, pt just diagnosed with squamous cell carcinoma of the lung TECHNIQUE:  Head CT performed with axial images from the skull base to the vertex without IV contrast. Sagittal and coronal reformats obtained. COMPARISON:  None. FINDINGS: Mild age-appropriate central and cortical atrophy. Mild periventricular probable chronic ischemic microvascular changes. No acute intracranial hemorrhage. No hyperdense arteries noted. No abnormal intra- or extraaxial fluid collections. No midline shift/ventriculomegaly. No fractures. Mucosal thickening in the partially visualized right maxillary antrum and the right aspect of the sphenoid sinus. The mastoid air cells are clear. Orbital contents are unremarkable. There are no osteolytic or osteoblastic lesions.     Impression: IMPRESSION: 1. No acute intracranial process. 2. Chronic findings, as described above. 3. If symptoms persist, or if clinically indicated, consider further evaluation with an MRI brain.     NM BONE WHOLE BODY    Result Date: 8/5/2021  Narrative: NM BONE WHOLE BODY HISTORY:  Breast cancer COMPARISON: CT chest 08/04/21, CT left hip 08/03/21 PROCEDURE: The patient was given an IV injection of 25.9 mCi of Tc99m MDP. Approximately 3 hours later, the patient had a whole body bone scan and dedicated planar images of the head and neck and pelvis/femurs were obtained. FINDINGS: Contamination associated with the blanket. Centrally photopenic, peripherally avid left iliac neck/acetabular lesion. No other FDG avid  lesions. Physiologic uptake by the kidneys with excretion into the urinary bladder. Presumed degenerative uptake lumbar spine. Degenerative uptake involving the thumbs, shoulders (more intense on the left), sternoclavicular joints, and ankles/feet. Presumed sinonasal inflammatory activity. Photopenia bilateral knee arthroplasties Physiologic uptake by the kidneys with excretion into the urinary bladder.     Impression: IMPRESSION: Centrally photopenic, peripherally avid left iliac neck/acetabular suspected metastasis. No scintigraphic evidence of any other lesions. Decreased sensitivity for lytic lesions.     Geriatric Problems and Recommendations:    Delirium, multifactorial. improving with decreased pain meds, but still confused about location and limited insight into medical conditions. DDx:  Pain medication (per son, it started when pt was placed on long acting pain medication), malignancy, uncontrolled SUKHJINDER, uncontrolled pain, hyponatremia.    - Per family, no problems with memory prior to this hospitalization.  - CT head negative, pain meds adjusted with somewhat improved mentation  - consider CPAP  - consider repeat labs to check sodium level  - Recommend nonpharmacologic delirium prevention strategies: Reorient patient, communicate clearly, white board updated., Encourage presence of family members, have them bring familiar objects., Discourage too much daytime napping, aim for uninterrupted periods of sleep at night., Keep room well lighted, shades open during the day.  Dim lights and soft music at HS., Avoid use of physical restraints/catheters., Patient at high risk of falls due to delirium.  Use bed and chair alarms along with frequent surveillance. and Keep active during the day, encourage self care  - continue pain med adjustment, so that she can make her own decisions.  If her thinking does not improve, POA-HC activation is appropriate once POA forms are completed.      Functional  decline/deconditioning. Per son, pt was ambulatory at home at baseline. Now pt is total assist.  Multiple hospitalizations. Per chart review, [facility] had been recommended multiple times, but pt would always want to go home w/therapy.  - continue PT/OT, family likely dc to home with HH or hospice     Goals of care/care transitions  - Son is confused about pt's prognosis. States he's heard pt may live weeks, but states he was also told after bone scan was done, that it may be up to a year.    - Son also has questions about treatment options for pt's lung cancer, as another family member told him that taking a cancer pill helped another family with cancer (unspecified type).   - Heme/onc to see today    Advanced directives:  - found out pt doesn't have POA-HC completed, awaiting completion tomorrow with SW when family arrives in person    We appreciate being able to see this senior patient in consultation with your service.  We will continue to follow her during this hospitalization.    Thank you and please contact us with any questions.    Discussed with or notes reviewed: RN, Family, Patient, MD and Reviewed old records    Karen Padua, DO  8/20/2021       10 (severe pain)

## 2023-03-09 NOTE — OB PROVIDER TRIAGE NOTE - NSOBPROVIDERNOTE_OBGYN_ALL_OB_FT
A/P    Patient is a 24 yo.  by LMP, 6/10/2022 at 38w6d who presents to L&D BIBEMS for abdominal pain, contractions q5 min since 4pm. No cervical dilation. History of similar pain caused by uterine fibroid degeneration.  - Fluids: 1L LR Bolus  - Zofran  - Monitor FHR and [place]

## 2023-03-10 ENCOUNTER — APPOINTMENT (OUTPATIENT)
Dept: ANTEPARTUM | Facility: CLINIC | Age: 23
End: 2023-03-10

## 2023-03-10 LAB
BASOPHILS # BLD AUTO: 0.04 K/UL — SIGNIFICANT CHANGE UP (ref 0–0.2)
BASOPHILS NFR BLD AUTO: 0.4 % — SIGNIFICANT CHANGE UP (ref 0–2)
EOSINOPHIL # BLD AUTO: 0.04 K/UL — SIGNIFICANT CHANGE UP (ref 0–0.5)
EOSINOPHIL NFR BLD AUTO: 0.4 % — SIGNIFICANT CHANGE UP (ref 0–6)
HCT VFR BLD CALC: 26.3 % — LOW (ref 34.5–45)
HCT VFR BLD CALC: 26.7 % — LOW (ref 34.5–45)
HGB BLD-MCNC: 7.6 G/DL — LOW (ref 11.5–15.5)
HGB BLD-MCNC: 7.9 G/DL — LOW (ref 11.5–15.5)
IMM GRANULOCYTES NFR BLD AUTO: 0.5 % — SIGNIFICANT CHANGE UP (ref 0–0.9)
LYMPHOCYTES # BLD AUTO: 1.7 K/UL — SIGNIFICANT CHANGE UP (ref 1–3.3)
LYMPHOCYTES # BLD AUTO: 16 % — SIGNIFICANT CHANGE UP (ref 13–44)
MCHC RBC-ENTMCNC: 20.7 PG — LOW (ref 27–34)
MCHC RBC-ENTMCNC: 21.1 PG — LOW (ref 27–34)
MCHC RBC-ENTMCNC: 28.9 GM/DL — LOW (ref 32–36)
MCHC RBC-ENTMCNC: 29.6 GM/DL — LOW (ref 32–36)
MCV RBC AUTO: 71.2 FL — LOW (ref 80–100)
MCV RBC AUTO: 71.7 FL — LOW (ref 80–100)
MONOCYTES # BLD AUTO: 1.08 K/UL — HIGH (ref 0–0.9)
MONOCYTES NFR BLD AUTO: 10.2 % — SIGNIFICANT CHANGE UP (ref 2–14)
NEUTROPHILS # BLD AUTO: 7.73 K/UL — HIGH (ref 1.8–7.4)
NEUTROPHILS NFR BLD AUTO: 72.5 % — SIGNIFICANT CHANGE UP (ref 43–77)
PLATELET # BLD AUTO: 345 K/UL — SIGNIFICANT CHANGE UP (ref 150–400)
PLATELET # BLD AUTO: 375 K/UL — SIGNIFICANT CHANGE UP (ref 150–400)
RBC # BLD: 3.67 M/UL — LOW (ref 3.8–5.2)
RBC # BLD: 3.75 M/UL — LOW (ref 3.8–5.2)
RBC # FLD: 25.2 % — HIGH (ref 10.3–14.5)
RBC # FLD: 25.4 % — HIGH (ref 10.3–14.5)
WBC # BLD: 10.64 K/UL — HIGH (ref 3.8–10.5)
WBC # BLD: 10.81 K/UL — HIGH (ref 3.8–10.5)
WBC # FLD AUTO: 10.64 K/UL — HIGH (ref 3.8–10.5)
WBC # FLD AUTO: 10.81 K/UL — HIGH (ref 3.8–10.5)

## 2023-03-10 RX ORDER — FOLIC ACID 0.8 MG
1 TABLET ORAL
Qty: 0 | Refills: 0 | DISCHARGE

## 2023-03-10 RX ORDER — IBUPROFEN 200 MG
1 TABLET ORAL
Qty: 12 | Refills: 0
Start: 2023-03-10 | End: 2023-03-12

## 2023-03-10 RX ORDER — ASPIRIN/CALCIUM CARB/MAGNESIUM 324 MG
1 TABLET ORAL
Qty: 0 | Refills: 0 | DISCHARGE

## 2023-03-10 RX ORDER — ACETAMINOPHEN 500 MG
2 TABLET ORAL
Qty: 24 | Refills: 0
Start: 2023-03-10 | End: 2023-03-12

## 2023-03-10 RX ADMIN — Medication 975 MILLIGRAM(S): at 00:03

## 2023-03-10 RX ADMIN — Medication 600 MILLIGRAM(S): at 15:30

## 2023-03-10 RX ADMIN — Medication 975 MILLIGRAM(S): at 06:12

## 2023-03-10 RX ADMIN — Medication 600 MILLIGRAM(S): at 08:35

## 2023-03-10 RX ADMIN — Medication 975 MILLIGRAM(S): at 12:27

## 2023-03-10 RX ADMIN — ENOXAPARIN SODIUM 40 MILLIGRAM(S): 100 INJECTION SUBCUTANEOUS at 15:29

## 2023-03-10 RX ADMIN — Medication 975 MILLIGRAM(S): at 18:06

## 2023-03-10 NOTE — PROGRESS NOTE ADULT - ASSESSMENT
DIMITRIOS WELCH is a 23y  now POD#1 s/p repeat  section at 38w6d gestation due to intractable pain, uncomplicated delivery.  -Vital signs stable  -Hgb: 9.8 -> 7.9, started on PO iron   -Voiding, tolerating PO, bowel function nml   -Advance care as tolerated   -Continue routine postpartum and postoperative care and education  -Healthy female infant  -Dispo: Continue inpatient management DIMITRIOS WELCH is a 23y  now POD#1 s/p repeat  section at 38w6d gestation due to intractable pain, uncomplicated delivery, Covid+ symptomatic  -Vital signs stable  -Hgb: 9.8 -> 7.9, started on PO iron   -Voiding, tolerating PO, bowel function nml   -Advance care as tolerated   -Continue routine postpartum and postoperative care and education  -Healthy female infant  -Dispo: Continue inpatient management

## 2023-03-10 NOTE — PROGRESS NOTE ADULT - SUBJECTIVE AND OBJECTIVE BOX
DIMITRIOS WELCH is a 23y  now POD#1 s/p repeat  section at 38w6d gestation due to intractable pain, uncomplicated delivery.    S:    No acute events overnight.   The patient has no complaints.  Pain controlled with current treatment regimen.   She is ambulating without difficulty and tolerating PO.   + flatus/-BM/+ voiding   She endorses appropriate lochia, which is decreasing.   She is breastfeeding without difficulty.   She denies fevers, chills, nausea and vomiting.   She denies lightheadedness, dizziness, palpitations, chest pain and SOB.     O:    T(C): 37 (03-10-23 @ 05:45), Max: 37 (03-10-23 @ 05:45)  HR: 87 (03-10-23 @ 05:45) (59 - 87)  BP: 124/79 (03-10-23 @ 05:45) (117/77 - 153/100)  RR: 18 (03-10-23 @ 05:45) (17 - 23)  SpO2: 99% (03-10-23 @ 05:45) (99% - 100%)    Gen: NAD, AOx3  Pulm: Resting comfortably on room air  Abdomen:  Soft, non-tender, non-distended, +bowel sounds  Incision: Clean/dry/intact with Steris in place   Uterus:  Fundus firm below umbilicus  VE:  Expectant lochia  Ext:  Non-tender and non-edematous                          7.9    10.64 )-----------( 345      ( 10 Mar 2023 05:45 )             26.7          DIMITRIOS WELCH is a 23y  now POD#1 s/p repeat  section at 38w6d gestation due to intractable pain, uncomplicated delivery, Covid+ symptomatic    S:    No acute events overnight.   The patient has no complaints.  Pain controlled with current treatment regimen.   She is ambulating without difficulty and tolerating PO.   + flatus/-BM/+ voiding   She endorses appropriate lochia, which is decreasing.   She is breastfeeding without difficulty.   She denies fevers, chills, nausea and vomiting.   She denies lightheadedness, dizziness, palpitations, chest pain and SOB.     O:    T(C): 37 (03-10-23 @ 05:45), Max: 37 (03-10-23 @ 05:45)  HR: 87 (03-10-23 @ 05:45) (59 - 87)  BP: 124/79 (03-10-23 @ 05:45) (117/77 - 153/100)  RR: 18 (03-10-23 @ 05:45) (17 - 23)  SpO2: 99% (03-10-23 @ 05:45) (99% - 100%)    Gen: NAD, AOx3  Pulm: Resting comfortably on room air  Abdomen:  Soft, non-tender, non-distended, +bowel sounds  Incision: Clean/dry/intact with Steris in place   Uterus:  Fundus firm below umbilicus  VE:  Expectant lochia  Ext:  Non-tender and non-edematous                          7.9    10.64 )-----------( 345      ( 10 Mar 2023 05:45 )             26.7

## 2023-03-11 VITALS
DIASTOLIC BLOOD PRESSURE: 81 MMHG | OXYGEN SATURATION: 100 % | RESPIRATION RATE: 18 BRPM | HEART RATE: 91 BPM | SYSTOLIC BLOOD PRESSURE: 120 MMHG | TEMPERATURE: 99 F

## 2023-03-11 PROCEDURE — 85025 COMPLETE CBC W/AUTO DIFF WBC: CPT

## 2023-03-11 PROCEDURE — C1889: CPT

## 2023-03-11 PROCEDURE — 85027 COMPLETE CBC AUTOMATED: CPT

## 2023-03-11 PROCEDURE — 86850 RBC ANTIBODY SCREEN: CPT

## 2023-03-11 PROCEDURE — 86900 BLOOD TYPING SEROLOGIC ABO: CPT

## 2023-03-11 PROCEDURE — U0003: CPT

## 2023-03-11 PROCEDURE — 86901 BLOOD TYPING SEROLOGIC RH(D): CPT

## 2023-03-11 PROCEDURE — 86780 TREPONEMA PALLIDUM: CPT

## 2023-03-11 PROCEDURE — 36415 COLL VENOUS BLD VENIPUNCTURE: CPT

## 2023-03-11 PROCEDURE — U0005: CPT

## 2023-03-11 PROCEDURE — 86769 SARS-COV-2 COVID-19 ANTIBODY: CPT

## 2023-03-11 RX ORDER — FERROUS SULFATE 325(65) MG
1 TABLET ORAL
Qty: 30 | Refills: 0
Start: 2023-03-11 | End: 2023-04-09

## 2023-03-11 RX ADMIN — SIMETHICONE 80 MILLIGRAM(S): 80 TABLET, CHEWABLE ORAL at 05:12

## 2023-03-11 RX ADMIN — Medication 975 MILLIGRAM(S): at 09:14

## 2023-03-11 RX ADMIN — SIMETHICONE 80 MILLIGRAM(S): 80 TABLET, CHEWABLE ORAL at 00:18

## 2023-03-11 RX ADMIN — Medication 975 MILLIGRAM(S): at 01:32

## 2023-03-11 RX ADMIN — Medication 600 MILLIGRAM(S): at 00:18

## 2023-03-11 RX ADMIN — Medication 600 MILLIGRAM(S): at 05:13

## 2023-03-11 NOTE — PROGRESS NOTE ADULT - SUBJECTIVE AND OBJECTIVE BOX
DIMITRIOS WELCH is a 23y  now POD#2 s/p repeat  section at 38w6d gestation due to intractable pain, uncomplicated delivery, Covid+ symptomatic    S:    No acute events overnight.   The patient has no complaints.  Pain controlled with current treatment regimen.   She is ambulating without difficulty and tolerating PO.   + flatus/-BM/+ voiding   She endorses appropriate lochia, which is decreasing.   She is breastfeeding without difficulty.   She denies fevers, chills, nausea and vomiting.   She denies lightheadedness, dizziness, palpitations, chest pain and SOB.     O:    Vital Signs Last 24 Hrs  T(C): 36.8 (11 Mar 2023 02:10), Max: 37 (10 Mar 2023 05:45)  T(F): 98.2 (11 Mar 2023 02:10), Max: 98.6 (10 Mar 2023 05:45)  HR: 88 (11 Mar 2023 02:10) (87 - 105)  BP: 130/84 (11 Mar 2023 02:10) (114/73 - 132/77)  BP(mean): --  RR: 17 (11 Mar 2023 02:10) (17 - 18)  SpO2: 97% (10 Mar 2023 21:00) (97% - 99%)    Parameters below as of 10 Mar 2023 21:00  Patient On (Oxygen Delivery Method): room air        Gen: NAD, AOx3  Pulm: Resting comfortably on room air  Abdomen:  Soft, non-tender, non-distended, +bowel sounds  Incision: Clean/dry/intact with Steris in place   Uterus:  Fundus firm below umbilicus  VE:  Expectant lochia  Ext:  Non-tender and non-edematous                          7.9    10.64 )-----------( 345      ( 10 Mar 2023 05:45 )             26.7

## 2023-03-11 NOTE — PROGRESS NOTE ADULT - ASSESSMENT
DIMITRIOS WELCH is a 23y  now POD#2 s/p repeat  section at 38w6d gestation due to intractable pain, uncomplicated delivery, Covid+ symptomatic  -Vital signs stable  -Hgb: 9.8 -> 7.9, sent home PO iron  -Voiding, tolerating PO, bowel function nml   -Advance care as tolerated   -Continue routine postpartum and postoperative care and education  -Healthy female infant  -Dispo: Discharge home today pending approval

## 2023-03-11 NOTE — PROGRESS NOTE ADULT - ATTENDING COMMENTS
23y  now POD#2 s/p repeat  section at 38w6d gestation due to intractable pain, uncomplicated delivery.   - Covid+ on admission, mildly symptomatic prior to admission, continues with only mild symptoms   - post partum Hgb 7.6, consistent with acute blood loss anemia, now with minimal bleeding/lochia only, no symptoms of anemia, plan for PO Fe  - health female infant at bedside   - vital signs, lab results, and physical exam reassuring   - DVT PPX: ambulation/Lovenox  - anticipated discharge: cleared for discharge
resident note reviewed  pain controlled, normal lochia  VS and labs reviewed                        7.6    10.81 )-----------( 375      ( 10 Mar 2023 11:55 )             26.3   POd#1 s/p repeat csection - stable, afebrile  anemia secondary to acute blood loss, asymptomatic , noon H/H stable from AM  encourage ambulation  patient with history of preeclampisa in last pregnancy - continue to monitor BPS would not discharge early

## 2023-03-13 ENCOUNTER — TRANSCRIPTION ENCOUNTER (OUTPATIENT)
Age: 23
End: 2023-03-13

## 2023-03-13 ENCOUNTER — EMERGENCY (EMERGENCY)
Facility: HOSPITAL | Age: 23
LOS: 1 days | Discharge: HOME | End: 2023-03-13
Attending: EMERGENCY MEDICINE
Payer: MEDICAID

## 2023-03-13 ENCOUNTER — APPOINTMENT (OUTPATIENT)
Dept: OBGYN | Facility: HOSPITAL | Age: 23
End: 2023-03-13

## 2023-03-13 VITALS
WEIGHT: 179.9 LBS | SYSTOLIC BLOOD PRESSURE: 121 MMHG | HEIGHT: 64 IN | RESPIRATION RATE: 18 BRPM | OXYGEN SATURATION: 100 % | TEMPERATURE: 98 F | DIASTOLIC BLOOD PRESSURE: 86 MMHG | HEART RATE: 79 BPM

## 2023-03-13 DIAGNOSIS — O21.0 MILD HYPEREMESIS GRAVIDARUM: Chronic | ICD-10-CM

## 2023-03-13 DIAGNOSIS — Z98.891 HISTORY OF UTERINE SCAR FROM PREVIOUS SURGERY: Chronic | ICD-10-CM

## 2023-03-13 PROCEDURE — 87077 CULTURE AEROBIC IDENTIFY: CPT

## 2023-03-13 PROCEDURE — 87186 SC STD MICRODIL/AGAR DIL: CPT

## 2023-03-13 PROCEDURE — 99284 EMERGENCY DEPT VISIT MOD MDM: CPT

## 2023-03-13 PROCEDURE — 87070 CULTURE OTHR SPECIMN AEROBIC: CPT

## 2023-03-13 RX ORDER — CEPHALEXIN 500 MG
1 CAPSULE ORAL
Qty: 10 | Refills: 0
Start: 2023-03-13 | End: 2023-03-17

## 2023-03-13 NOTE — ED STATDOCS - PATIENT PORTAL LINK FT
You can access the FollowMyHealth Patient Portal offered by [facility] by registering at the following website: [email or web address]. By joining [employer]’s FollowMyHealth portal, you will also be able to view your health information using other applications (apps) compatible with our system.

## 2023-03-13 NOTE — ED STATDOCS - GASTROINTESTINAL, MLM
abdomen soft, no erythema on abdominal wall, purulent discharge on steri strips, no surgical wound dehiscence. abdomen soft,

## 2023-03-13 NOTE — ED STATDOCS - SKIN, MLM
no erythema on abdominal wall, purulent discharge on steri strips, no obvious surgical wound dehiscence but difficult to fully visualize incision due to steri-strips.

## 2023-03-13 NOTE — ED STATDOCS - DR. NAME
Mom given discharge instructions and follow up information given. Parents deny further questions. Patient resting comfortably at this time. Patient discharged home.    Sigrid

## 2023-03-13 NOTE — ED STATDOCS - OBJECTIVE STATEMENT
24 y/o female with pmhx of pre-eclampsia, anemia, Asthma, migraines and  2x (recent one was 3/9), c/o post-op complication from  done on 3/9 here by Dr. Avina. Pt was showering yesterday, while patted dried with towel and states not noticing pieces of towel stuck to surgical site. Partner noticed pus coming from surgical site yesterday. Pt called GYN but wasn't able to reach them. Pt reports having normal bleeding since  but stopped bleeding which is worrying her. Denies fever. Pt c/o headache. Denies dysuria. Allergic to Morphine and oxycodone. presents for evaluation of drainage from  incision.  Had  3/9.  Reports "snagging" a suture while toweling off after shower yesterday then noted pus drainage.  Report that incision feels itchy, no pain.  Denies fever.  Denies dysuria.  Also with mild headache.

## 2023-03-13 NOTE — ED STATDOCS - CLINICAL SUMMARY MEDICAL DECISION MAKING FREE TEXT BOX
surgical wound infection will need GYN consult. surgical wound infection will need GYN consult.    DICK BLANC: to start on abx, dc with fu with NICOLASA

## 2023-03-13 NOTE — ED STATDOCS - NSFOLLOWUPINSTRUCTIONS_ED_ALL_ED_FT
please take antibiotic as directed   please follow with MD BALDWIN   new or worsening symptoms return to the ED for further evaluation

## 2023-03-13 NOTE — ED STATDOCS - NS_ ATTENDINGSCRIBEDETAILS _ED_A_ED_FT
I, River Matta, performed the initial face to face bedside interview with this patient regarding history of present illness, review of symptoms and relevant past medical, social and family history.  I completed an independent physical examination.  I was the provider who initially evaluated this patient.  The history, relevant review of systems, past medical and surgical history, medical decision making, and physical examination was documented by the scribe in my presence and I attest to the accuracy of the documentation. Follow-up on ordered tests (ie labs, radiologic studies) and re-evaluation of the patient's status has been communicated to the ACP.  Disposition of the patient will be based on test outcome and response to ED interventions.

## 2023-03-13 NOTE — CONSULT NOTE ADULT - SUBJECTIVE AND OBJECTIVE BOX
DIMITRIOS WELCH is a 23y , POD4 from CS who presented to the ED for incisional drainage noticed this morning. Denies fevers, chills, nausea or vomiting. GYN consulted for evaluation of CS incision.    POB:   G1: pCS (2020) 38w secondary to non reassuring FHT, admitted initially with ctx PEC  G2:  top  G3: rCS, POD4  PGYN: +fibroids/-cysts, denies STD hx, hx LSIL on Pap  PMH: Kidney stones  PSH: C/S  SH: Denies tobacco use, EtOH use and illicit drug use during the pregnancy; Feels safe at home  Meds: ibuprofen, tylenol  All: oxycodone, morphine    Physical Exam  T(C): 36.9 (23 @ 11:59), Max: 36.9 (23 @ 11:59)  HR: 79 (23 @ 11:59) (79 - 79)  BP: 121/86 (23 @ 11:59) (121/86 - 121/86)  RR: 18 (23 @ 11:59) (18 - 18)  SpO2: 100% (23 @ 11:59) (100% - 100%)    General: alert and oriented x3, no acute distress  Abdominal: Non- tender to palpation in all 4 quadrants. Minimal incisional drainage at left lateral edge - serosanguinous. Incision intact. No surrounding erythema or swelling.

## 2023-03-13 NOTE — ED ADULT NURSE NOTE - OBJECTIVE STATEMENT
Assumed care of patient in pr-c. Pt a&ox4 rr even and unlabored with pmhx of pre-eclampsia, anemia, asthma, migraines and  2x (recent 3/9 at [facility]) presents to ed with post op complication from recent  by Dr. fay. Pt reports seeing pus from surgical site yesterday after showering. Pt reports unable to get incontact with gyn. Pt denies fever, chills, chest pain, sob, urinary symptoms. pt educated on plan of care, pt able to successfully teach back plan of care to RN, RN will continue to reeducate pt during hospital stay.

## 2023-03-13 NOTE — ED ADULT NURSE NOTE - NSFALLRSKINDICATORS_ED_ALL_ED
3/27/2019       RE: Bernie Hernandez  [address] [place] [address]     Dear Colleague,    Thank you for referring your patient, Bernie Hernandez, to the [facility] [place] at [facility]. Please see a copy of my visit note below.    This very pleasant 58-year-old lady returns today for cystoscopy for further evaluation of asymptomatic microscopic hematuria.  CYTOLOGIC INTERPRETATION:      Urine, voided:   Negative for High-Grade Urothelial Carcinoma   Crystals present.   Specimen Adequacy: Satisfactory for evaluation.     I have personally reviewed all specimens and/or slides, including the   listed special stains, and used them   with my medical judgement to determine or confirm the final diagnosis.     Electronically signed out by:     Cullen Vincent M.D    Study Result     CT ABDOMEN AND PELVIS WITHOUT AND WITH CONTRAST  3/27/2019 1:50 PM       HISTORY: Unknown cause asymptomatic microscopic hematuria.     COMPARISON: None.     TECHNIQUE: Volumetric helical acquisition of CT images from the lung  bases through the symphysis pubis before and after the uneventful  administration of 100 mL Isovue-370 intravenous contrast. Radiation  dose for this scan was reduced using automated exposure control,  adjustment of the mA and/or kV according to patient size, or iterative  reconstruction technique.     FINDINGS: There are no stones seen within either kidney, either  ureter, or the bladder. There is no hydroureter or hydronephrosis.  There is no perinephric fat stranding. Kidneys are normal in size and  configuration. No suspicious filling defects seen in the opacified  intrarenal collecting systems, ureters, or bladder to suggest a  urothelial malignancy.  The liver, spleen, adrenal glands, and  pancreas demonstrate no worrisome focal lesion.  Low-attenuation  subcentimeter liver lesion(s) compatible with benign cysts or other  benign lesions. No  specific evaluation or follow-up is recommended in  a low risk patient. There are mild atherosclerotic changes of the  visualized aorta and its branches. There is no evidence of aortic  dissection or aneurysm. Unremarkable gallbladder. Visualized portions  of the appendix unremarkable.  No free fluid. No free air in the  abdomen. There are no abdominal or pelvic lymph nodes that are  abnormal by size criteria.  There are no dilated loops of small  intestine or large bowel to suggest ileus or obstruction.The  visualized lung bases are unremarkable. Bone windows reveal no  destructive lesions.                                                                        IMPRESSION:   1. No evidence for renal, ureteral, or bladder calculi.  2. No masses or suspicious filling defects within the opacified  urinary collecting system.      Procedure.  Cystoscopy.   Surgeon.    Zina  Anesthesia.  Local anesthesia.  Description.  With the patient in the dorsal lithotomy position, with the genital area prepped and draped in the customary fashion, with local anesthetic and the urethra, the flexible cystoscope was Inserted.  The urethra is unremarkable.  The walls of the urethra coapts satisfactorily.  The interior of the bladder was carefully inspected.  There is no evidence of neoplasm or stone in the bladder.  There is no significant trabeculation.  The ureteric orifices are in good position with normal E flux from each.  There are no other remarkable features.    Impression.  I have not found a serious cause asymptomatic microscopic hematuria.  The urine cytology and the CT scans were quite normal in addition to the cystoscopy  At this point if this is observed in the future I recommend no further investigation.  However if gross hematuria is observed I would wish to see her promptly.  In addition she explained to me that she was getting some problems with urgency and some urge incontinence particularly in the morning after  "drinking caffeinated beverages.  I have therefore recommended she discontinue caffeinated beverages to see how this improves her symptoms as I suspect it well.  I do not think we need to consider other measures for the problems with urgency other than those related to dietary measures.  I did discuss the situation very carefully to the patient in detail today.  I answered all her questions.    Plan.  I will see her on a as needed basis.    Time.  15 minutes was spent in addition to the procedure in order to review both the laboratory studies and radiologic studies as well as the discussion regarding the findings of cystoscopy.  In addition we also had discussions of prior problems with urgency and urge incontinence and therapeutic strategies that may be beneficial for her at the present time     \"This dictation was performed with voice recognition software and may contain errors,  omissions and inadvertent word substitution.\"      Again, thank you for allowing me to participate in the care of your patient.      Sincerely,    Blake Izaguirre MD      " no

## 2023-03-13 NOTE — ED ADULT TRIAGE NOTE - CHIEF COMPLAINT QUOTE
pt states she had a c section 3/9/2023, right side of incision has pus, infected  sent by OB to be checked  A&Ox3, resp wnl

## 2023-03-13 NOTE — ED STATDOCS - PROGRESS NOTE DETAILS
Connie SMITH for ED attending, Dr. Matta. Case discussed with GYN resident and who will come to evaluate pt. DICK BLANC: PT evaluated by intake physician. HPI/PE/ROS as noted above. Will follow up plan per intake physician   GYN consulted pending consultation GYN recs for abx and fu outpt with NICOLASA

## 2023-03-14 DIAGNOSIS — Z34.93 ENCOUNTER FOR SUPERVISION OF NORMAL PREGNANCY, UNSPECIFIED, THIRD TRIMESTER: ICD-10-CM

## 2023-03-14 DIAGNOSIS — Z87.898 PERSONAL HISTORY OF OTHER SPECIFIED CONDITIONS: ICD-10-CM

## 2023-03-14 DIAGNOSIS — O21.9 VOMITING OF PREGNANCY, UNSPECIFIED: ICD-10-CM

## 2023-03-14 DIAGNOSIS — B95.1 STREPTOCOCCUS, GROUP B, AS THE CAUSE OF DISEASES CLASSIFIED ELSEWHERE: ICD-10-CM

## 2023-03-14 RX ORDER — DOXYLAMINE SUCCINATE AND PYRIDOXINE HYDROCHLORIDE 10; 10 MG/1; MG/1
10-10 TABLET, DELAYED RELEASE ORAL
Qty: 60 | Refills: 1 | Status: DISCONTINUED | COMMUNITY
Start: 2022-07-27 | End: 2023-03-14

## 2023-03-14 RX ORDER — ONDANSETRON 4 MG/1
4 TABLET, ORALLY DISINTEGRATING ORAL
Qty: 14 | Refills: 2 | Status: DISCONTINUED | COMMUNITY
Start: 2022-07-20 | End: 2023-03-14

## 2023-03-15 LAB
-  AMIKACIN: SIGNIFICANT CHANGE UP
-  AMOXICILLIN/CLAVULANIC ACID: SIGNIFICANT CHANGE UP
-  AMPICILLIN/SULBACTAM: SIGNIFICANT CHANGE UP
-  AMPICILLIN: SIGNIFICANT CHANGE UP
-  AZTREONAM: SIGNIFICANT CHANGE UP
-  CEFAZOLIN: SIGNIFICANT CHANGE UP
-  CEFEPIME: SIGNIFICANT CHANGE UP
-  CEFOXITIN: SIGNIFICANT CHANGE UP
-  CEFTRIAXONE: SIGNIFICANT CHANGE UP
-  CIPROFLOXACIN: SIGNIFICANT CHANGE UP
-  ERTAPENEM: SIGNIFICANT CHANGE UP
-  GENTAMICIN: SIGNIFICANT CHANGE UP
-  IMIPENEM: SIGNIFICANT CHANGE UP
-  LEVOFLOXACIN: SIGNIFICANT CHANGE UP
-  MEROPENEM: SIGNIFICANT CHANGE UP
-  PIPERACILLIN/TAZOBACTAM: SIGNIFICANT CHANGE UP
-  TOBRAMYCIN: SIGNIFICANT CHANGE UP
-  TRIMETHOPRIM/SULFAMETHOXAZOLE: SIGNIFICANT CHANGE UP
CULTURE RESULTS: SIGNIFICANT CHANGE UP
METHOD TYPE: SIGNIFICANT CHANGE UP
ORGANISM # SPEC MICROSCOPIC CNT: SIGNIFICANT CHANGE UP
ORGANISM # SPEC MICROSCOPIC CNT: SIGNIFICANT CHANGE UP
SPECIMEN SOURCE: SIGNIFICANT CHANGE UP

## 2023-03-16 ENCOUNTER — APPOINTMENT (OUTPATIENT)
Dept: OBGYN | Facility: CLINIC | Age: 23
End: 2023-03-16
Payer: MEDICAID

## 2023-03-16 VITALS
BODY MASS INDEX: 27.83 KG/M2 | DIASTOLIC BLOOD PRESSURE: 70 MMHG | SYSTOLIC BLOOD PRESSURE: 120 MMHG | WEIGHT: 163 LBS | HEIGHT: 64 IN

## 2023-03-16 PROCEDURE — 0503F POSTPARTUM CARE VISIT: CPT

## 2023-03-16 NOTE — HISTORY OF PRESENT ILLNESS
[Postpartum Follow Up] : postpartum follow up [Complications:___] : no complications [Repeat C/S] : delivered by  section (repeat) [S/Sx PP Depression] : no signs/symptoms of postpartum depression [Clean/Dry/Intact] : clean, dry and intact [Erythema] : not erythematous [Doing Well] : is doing well [No Sign of Infection] : is showing no signs of infection [Excellent Pain Control] : has excellent pain control [None] : None

## 2023-03-22 ENCOUNTER — APPOINTMENT (OUTPATIENT)
Dept: OBGYN | Facility: CLINIC | Age: 23
End: 2023-03-22
Payer: MEDICAID

## 2023-03-22 VITALS
SYSTOLIC BLOOD PRESSURE: 120 MMHG | HEIGHT: 64 IN | WEIGHT: 164 LBS | DIASTOLIC BLOOD PRESSURE: 82 MMHG | BODY MASS INDEX: 28 KG/M2

## 2023-03-22 PROCEDURE — 0503F POSTPARTUM CARE VISIT: CPT

## 2023-03-22 NOTE — HISTORY OF PRESENT ILLNESS
[Postpartum Follow Up] : postpartum follow up [Complications:___] : no complications [Repeat C/S] : delivered by  section (repeat) [Breastfeeding] : currently nursing [S/Sx PP Depression] : no signs/symptoms of postpartum depression [Clean/Dry/Intact] : clean, dry and intact [Erythema] : not erythematous [Doing Well] : is doing well [No Sign of Infection] : is showing no signs of infection [Excellent Pain Control] : has excellent pain control [None] : None

## 2023-04-06 NOTE — ED PEDIATRIC NURSE NOTE - PAIN: PRESENCE, MLM
denies pain/discomfort Azelaic Acid Counseling: Patient counseled that medicine may cause skin irritation and to avoid applying near the eyes.  In the event of skin irritation, the patient was advised to reduce the amount of the drug applied or use it less frequently.   The patient verbalized understanding of the proper use and possible adverse effects of azelaic acid.  All of the patient's questions and concerns were addressed.

## 2023-04-20 ENCOUNTER — APPOINTMENT (OUTPATIENT)
Dept: OBGYN | Facility: CLINIC | Age: 23
End: 2023-04-20
Payer: MEDICAID

## 2023-04-20 VITALS
WEIGHT: 158 LBS | DIASTOLIC BLOOD PRESSURE: 70 MMHG | HEIGHT: 64 IN | BODY MASS INDEX: 26.98 KG/M2 | SYSTOLIC BLOOD PRESSURE: 116 MMHG

## 2023-04-20 DIAGNOSIS — Z30.09 ENCOUNTER FOR OTHER GENERAL COUNSELING AND ADVICE ON CONTRACEPTION: ICD-10-CM

## 2023-04-20 PROCEDURE — 0503F POSTPARTUM CARE VISIT: CPT

## 2023-04-20 NOTE — HISTORY OF PRESENT ILLNESS
[Postpartum Follow Up] : postpartum follow up [Complications:___] : no complications [Primary C/S] : delivered by  section [Breastfeeding] : currently nursing [Intended Contraception] : Intended Contraception: [IUD] : intrauterine device [S/Sx PP Depression] : no signs/symptoms of postpartum depression [Clean/Dry/Intact] : clean, dry and intact [Erythema] : not erythematous [Back to Normal] : is back to normal in size [Mild] : mild vaginal bleeding [Normal] : the vagina was normal [Cervix Sample Taken] : cervical sample not taken for a Pap smear [Examination Of The Breasts] : breasts are normal [Doing Well] : is doing well [No Sign of Infection] : is showing no signs of infection [Excellent Pain Control] : has excellent pain control [None] : None

## 2023-04-25 LAB
BILIRUB UR QL STRIP: NORMAL
GLUCOSE UR-MCNC: NORMAL
HCG UR QL: 2 EU/DL
HGB UR QL STRIP.AUTO: NORMAL
KETONES UR-MCNC: NORMAL
LEUKOCYTE ESTERASE UR QL STRIP: NORMAL
NITRITE UR QL STRIP: NORMAL
PH UR STRIP: 6.5
PROT UR STRIP-MCNC: ABNORMAL
SP GR UR STRIP: 1.02

## 2023-04-26 LAB
BILIRUB UR QL STRIP: NORMAL
BILIRUB UR QL STRIP: NORMAL
GLUCOSE UR-MCNC: NORMAL
GLUCOSE UR-MCNC: NORMAL
HCG UR QL: 1 EU/DL
HCG UR QL: 4 EU/DL
HGB UR QL STRIP.AUTO: ABNORMAL
HGB UR QL STRIP.AUTO: NORMAL
KETONES UR-MCNC: NORMAL
KETONES UR-MCNC: NORMAL
LEUKOCYTE ESTERASE UR QL STRIP: NORMAL
LEUKOCYTE ESTERASE UR QL STRIP: NORMAL
NITRITE UR QL STRIP: NORMAL
NITRITE UR QL STRIP: NORMAL
PH UR STRIP: 6.5
PH UR STRIP: 7.5
PROT UR STRIP-MCNC: NORMAL
PROT UR STRIP-MCNC: NORMAL
SP GR UR STRIP: 1.01
SP GR UR STRIP: 1.01

## 2023-05-08 ENCOUNTER — APPOINTMENT (OUTPATIENT)
Dept: OBGYN | Facility: CLINIC | Age: 23
End: 2023-05-08

## 2023-05-10 LAB
BILIRUB UR QL STRIP: NORMAL
GLUCOSE UR-MCNC: NORMAL
HCG UR QL: 1 EU/DL
HGB UR QL STRIP.AUTO: NORMAL
KETONES UR-MCNC: NORMAL
LEUKOCYTE ESTERASE UR QL STRIP: NORMAL
NITRITE UR QL STRIP: NORMAL
PH UR STRIP: 7
PROT UR STRIP-MCNC: NORMAL
SP GR UR STRIP: 1.02

## 2023-05-25 ENCOUNTER — APPOINTMENT (OUTPATIENT)
Dept: OBGYN | Facility: CLINIC | Age: 23
End: 2023-05-25

## 2023-06-26 NOTE — OB RN TRIAGE NOTE - NS_BABIESUTERO_OBGYN_ALL_OB_NU
Patient Education       Well Child Exam 7 to 8 Years   About this topic   Your child's well child exam is a visit with the doctor to check your child's health. The doctor measures your child's weight and height, and may measure your child's body mass index (BMI). The doctor plots these numbers on a growth curve. The growth curve gives a picture of your child's growth at each visit. The doctor may listen to your child's heart, lungs, and belly. Your doctor will do a full exam of your child from the head to the toes.  Your child may also need shots or blood tests during this visit.  General   Growth and Development   Your doctor will ask you how your child is developing. The doctor will focus on the skills that most children your child's age are expected to do. During this time of your child's life, here are some things you can expect.  Movement - Your child may:  Be able to write and draw well  Kick a ball while running  Be independent in bathing or showering  Enjoy team or organized sports  Have better hand-eye coordination  Hearing, seeing, and talking - Your child will likely:  Have a longer attention span  Be able to tell time  Enjoy reading  Understand concepts of counting, same and different, and time  Be able to talk almost at the level of an adult  Feelings and behavior - Your child will likely:  Want to do a very good job and be upset if making mistakes  Take direction well  Understand the difference between right and wrong  May have low self confidence  Need encouragement and positive feedback  Want to fit in with peers  Feeding - Your child needs:  3 servings of lowfat or fat-free milk each day  5 servings of fruits and vegetables each day  To start each day with a healthy breakfast  To be given a variety of healthy foods. Many children like to help cook and make food fun.  To limit fruit juice, soda, chips, candy, and foods high in fats  To eat meals as a part of the family. Turn the TV and cell phone off  while eating. Talk about your day, rather than focusing on what your child is eating.  Sleep - Your child:  Is likely sleeping about 10 hours in a row at night.  Try to have the same routine before bedtime. Read to your child each night before bed.  Have your child brush teeth before going to bed as well.  Keep electronic devices like TV's, phones, and tablets out of bedrooms overnight.  Shots or vaccines - It is important for your child to get a flu vaccine each year.  Help for Parents   Play with your child.  Encourage your child to spend at least 1 hour each day being physically active.  Offer your child a variety of activities to take part in. Include music, sports, arts and crafts, and other things your child is interested in. Take care not to over schedule your child. 1 to 2 activities a week outside of school is often a good number for your child.  Make sure your child wears a helmet when using anything with wheels like skates, skateboard, bike, etc.  Encourage time spent playing with friends. Provide a safe area for play.  Read to your child. Have your child read to you.  Here are some things you can do to help keep your child safe and healthy.  Have your child brush teeth 2 to 3 times each day. Children this age are able to floss their teeth as well. Your child should also see a dentist 1 to 2 times each year for a cleaning and checkup.  Put sunscreen with a SPF30 or higher on your child at least 15 to 30 minutes before going outside. Put more sunscreen on after about 2 hours.  Talk to your child about the dangers of smoking, drinking alcohol, and using drugs. Do not allow anyone to smoke in your home or around your child.  Your child needs to ride in a booster seat until 4 feet 9 inches (145 cm) tall. After that, make sure your child uses a seat belt when riding in the car. Your child should ride in the back seat until at least 13 years old.  Take extra care around water. Consider teaching your child to  swim.  Never leave your child alone. Do not leave your child in the car or at home alone, even for a few minutes.  Protect your child from gun injuries. If you have a gun, use a trigger lock. Keep the gun locked up and the bullets kept in a separate place.  Limit screen time for children to 1 to 2 hours per day. This means TV, phones, computers, or video games.  Parents need to think about:  Teaching your child what to do in case of an emergency  Monitoring your childs computer use, especially if on the Internet  Talking to your child about strangers, unwanted touch, and keeping private parts safe  How to talk to your child about puberty  Having your child help with some family chores to encourage responsibility within the family  The next well child visit will most likely be when your child is 8 to 9 years old. At this visit your doctor may:  Do a full check up on your child  Talk about limiting screen time for your child, how well your child is eating, and how to promote physical activity  Ask how your child is doing at school and how your child gets along with other children  Talk about signs of puberty  When do I need to call the doctor?   Fever of 100.4°F (38°C) or higher  Has trouble eating or sleeping  Has trouble in school  You are worried about your child's development  Where can I learn more?   Centers for Disease Control and Prevention  http://www.cdc.gov/ncbddd/childdevelopment/positiveparenting/middle.html   KidsHealth  http://kidshealth.org/parent/growth/medical/checkup_7yrs.html   Last Reviewed Date   2019-09-12  Consumer Information Use and Disclaimer   This information is not specific medical advice and does not replace information you receive from your health care provider. This is only a brief summary of general information. It does NOT include all information about conditions, illnesses, injuries, tests, procedures, treatments, therapies, discharge instructions or life-style choices that may  apply to you. You must talk with your health care provider for complete information about your health and treatment options. This information should not be used to decide whether or not to accept your health care providers advice, instructions or recommendations. Only your health care provider has the knowledge and training to provide advice that is right for you.  Copyright   Copyright © 2021 UpToDate, Inc. and its affiliates and/or licensors. All rights reserved.    A 4 year old child who has outgrown the forward facing, internal harness system shall be restrained in a belt positioning child booster seat.  If you have an active [facility] account, please look for your well child questionnaire to come to your [facility] account before your next well child visit.   1

## 2023-07-12 NOTE — ED ADULT TRIAGE NOTE - ARRIVAL FROM
CONSTITUTIONAL: Denies fever and chills    HEENT: See HPI    RESPIRATORY: +SOB    CARDIOVASCULAR: Denies palpitations and chest pain.
Home

## 2023-07-27 NOTE — OB RN TRIAGE NOTE - PATIENT'S GENDER IDENTITY
Female Dapsone Pregnancy And Lactation Text: This medication is Pregnancy Category C and is not considered safe during pregnancy or breast feeding.

## 2023-09-27 NOTE — OB RN PATIENT PROFILE - NS TRANSFER RESPONSE BELONGING
SVE 2/50/-3, posterior  Irregular contractions  I attempted another FB but it would not stay in place  Too early for AROM  Continue with pitocin for now  Epidural prn    Ludwig Hernandez MD  Obstetrics and Gynecology  [facility]     yes

## 2023-10-02 NOTE — ED STATDOCS - NS_ATTENDINGSCRIBE_ED_ALL_ED
Continued Stay SW/CM Assessment/Plan of Care Note       Active Substitute Decision Maker (SDM)     DEBBY LINDSEY Surrogate Primary Contact - Spouse   Primary Phone: [phone] (Mobile)                   Progress note:    DCP PT/OT now recs home. DERRELL spoke with pt's dtr, dtr also requesting patient return home upon dc. DERRELL paged MD requesting [facility] order.      Dtr states that family is working on 24hrs assist, pt lives with spouse who works during the day. DERRELL emailed dtr private duty caregiver list ([email or web address]). DERRELL also sent referral to [facility] to [facility] for homemaker services. DERRELL to follow.    3:53 PM  UPDATE: HHC order rec'd, referral sent to [facility]-family agreeable. Per dtr, address on facesheet is wrong-correct address is [address]-DERRELL informed [facility].    I personally performed the service described in the documentation recorded by the scribe in my presence, and it accurately and completely records my words and actions.

## 2023-11-24 NOTE — ED ADULT TRIAGE NOTE - BRAND OF COVID-19 VACCINATION
Pfizer dose 1 and 2
71 y/o female with pmhx ILD, s/p R knee replacement x9 weeks ago, RLE DVT dx 11/2 on xarelto presents to ED with c/o sudden shooting pain to right knee radiating upward into thigh as well as downwards into the foot while standing on line in [employer] accompanied by swelling to kneecap descending down the leg into the calf. Patient reports she was not swollen like this up until this point when she felt the sudden shooting sensation at the mall. Denies CP, SOB. Has been taking Xarelto as prescribed 15mg BID.  patient kept in observation for MRI- MRI shows hemarthrosis with strain. pain significantly improved with just  using Dilaudid . explained the potency of the Dilaudid. a dose of Narcan prescribed for the patient to take at home as needed in case of the adverse reaction of the Dilaudid. spoke with orthopedic PA recommended nonbear weight on the right ring extremity. PT seen the patient recommended home.  patient has a walker to use. prescribed  Dilaudid for the patient. will DC the patient follow-up with his primary care doctor/vascular and orthopedic doctor. patient requested Dr. gibbs given

## 2023-12-06 NOTE — ED ADULT TRIAGE NOTE - GLASGOW COMA SCALE: EYE OPENING, MLM
Assessment/Plan:    Diagnoses and all orders for this visit:    Left inguinal pain  -     Ambulatory Referral to General Surgery    Testicular pain, left  -     Ambulatory Referral to General Surgery    No signs of hernia recurrence. Physical exam reveals some scant thickening of the spermatic cord. Will ultrasound of the scrotum. Also recommend Tylenol 650 mg 3 times a day for 4 days as a steady state of an anti-inflammatory. Subjective:      Patient ID: Florecita Pineda. Bebeto Silverman is a 77 y.o. male. Patient presents for evaluation of left groin pain. S/P bilateral inguinal hernia repair with robotics 8/11/2023. Left inguinal hernia was larger at the time of surgery. Complains of intermittent left testicular pain only with certain positions. States he will sit down and he seems to suddenly catch him but goes away soon. Does take occasional Tylenol for back issues. Denies any lumps or masses in the region. The following portions of the patient's history were reviewed and updated as appropriate:     He  has a past medical history of Basal cell carcinoma (07/26/2023), Hypertension, SCC (squamous cell carcinoma) (02/28/2022), SCC (squamous cell carcinoma) (02/28/2022), SCC (squamous cell carcinoma) (02/28/2022), SCC (squamous cell carcinoma) (2022), SCC (squamous cell carcinoma) (09/19/2023), SCC (squamous cell carcinoma), leg, right (07/13/2022), and Squamous cell skin cancer (08/06/2021). He  has a past surgical history that includes Laparoscopic donor nephrectomy (Right); Joint replacement (Right); pr neuroplasty &/transpos median nrv carpal tunne (Right, 02/02/2022); Mohs surgery (08/24/2021); Mohs surgery (04/04/2022); Mohs surgery (Left, 04/18/2022); Mohs surgery (Left, 04/18/2022); Mohs surgery (Right, 09/06/2022); Mohs surgery (Left, 09/13/2022); Colonoscopy; pr laparoscopy surg rpr initial inguinal hernia (Bilateral, 08/11/2023);  Mohs surgery (Right, 09/19/2023); and Mohs surgery (Left, 11/21/2023). His family history includes Arthritis in his brother; Coronary artery disease in his father; No Known Problems in his mother. He  reports that he has never smoked. He has never used smokeless tobacco. He reports current alcohol use of about 4.0 standard drinks of alcohol per week. He reports that he does not use drugs. Current Outpatient Medications   Medication Sig Dispense Refill    clobetasol (TEMOVATE) 0.05 % ointment Apply topically 2 (two) times a day 60 g 0    Melatonin 10 MG TABS Take 10 mg by mouth as needed      Multiple Vitamin (multivitamin) tablet Take 1 tablet by mouth daily (Patient not taking: Reported on 11/15/2023)      mupirocin (BACTROBAN) 2 % ointment Apply topically daily 22 g 3    niacinamide 500 mg tablet Take 1 tablet (500 mg total) by mouth 2 (two) times a day with meals 60 tablet 2     No current facility-administered medications for this visit. He is allergic to penicillin v..    Review of Systems   Gastrointestinal:  Positive for abdominal pain. All other systems reviewed and are negative. Objective: There were no vitals taken for this visit. Physical Exam  Constitutional:       General: He is not in acute distress. Appearance: He is not toxic-appearing. Abdominal:      General: Abdomen is flat. Palpations: Abdomen is soft. Hernia: No hernia is present. Comments: Examined in a standing position with multiple Valsalva maneuvers. No signs of an inguinal hernia recurrence on either side. Scant thickening of the left spermatic cord. Some mild tenderness palpation of the left testicle. Both testicles are descended and equal in size. (E4) spontaneous

## 2024-02-12 NOTE — ED ADULT TRIAGE NOTE - CCCP TRG CHIEF CMPLNT
Department of Anesthesiology  Postprocedure Note    Patient: Kavon Rosario III  MRN: [id number]  YOB: 1977  Date of evaluation: 2/12/2024    Procedure Summary       Date: 02/12/24 Room / Location: [facility] MAIN OR 02 / [facility] MAIN OR    Anesthesia Start: 1315 Anesthesia Stop: 1541    Procedure: THORACOSCOPY RIGHT WITH MEDIASTINAL LYMPH NODE BIOPSY, LUNG BIOPSY (Right: Chest) Diagnosis:       Lymphadenopathy      (Lymphadenopathy [R59.1])    Providers: Janice Stevenson MD Responsible Provider: Terry Watters MD    Anesthesia Type: general ASA Status: 3            Anesthesia Type: No value filed.    Hussain Phase I: Hussain Score: 8    Hussain Phase II:      Anesthesia Post Evaluation    Patient location during evaluation: PACU  Patient participation: complete - patient participated  Level of consciousness: sleepy but conscious  Pain score: 2  Airway patency: patent  Nausea & Vomiting: no nausea  Cardiovascular status: blood pressure returned to baseline and hemodynamically stable  Respiratory status: acceptable  Hydration status: euvolemic  Multimodal analgesia pain management approach  Pain management: adequate and satisfactory to patient    No notable events documented.  
abdominal pain

## 2024-03-04 NOTE — OB PROVIDER DELIVERY SUMMARY - NS_TIMERUPTUREDADMITTED_OBGYN_ALL_OB_FT
Please follow up with Orthopedics for further evaluation of your back.  Please also follow up Cardiology for further evaluation due to EKG showing a possible small right bundle branch block.     Please return to the ED for worsening back pain, numbness and tingling in between her legs, change in urination or having a bowel movement, fever, chest pain, shortness breath, or any new or worsening concerns.   3 Hour(s) 9 Minute(s)

## 2024-03-17 NOTE — OB RN TRIAGE NOTE - NSICDXPASTMEDICALHX_GEN_ALL_CORE_FT
[[id number]] PAST MEDICAL HISTORY:  Anemia of chronic disease     Asthma     Fibroids     Kidney stones     Migraines     Preeclampsia

## 2024-05-22 NOTE — OB RN TRIAGE NOTE - SUICIDE SCREENING QUESTION 1
Introduction Text (Please End With A Colon): The following procedure was deferred: per patient request. Understands risks of delaying procedure. Prefers to monitor. X Size Of Lesion In Cm (Optional): 0 Detail Level: Detailed Introduction Text (Please End With A Colon): The following procedure was deferred: No

## 2024-07-03 NOTE — ED ADULT NURSE NOTE - NS ED NURSE LEVEL OF CONSCIOUSNESS SPEECH
Received cardiac clearance request from DR VANDA OLVERA stating pt has REPLACEMENT OF PAIN PUMP BATTERY and is requiring a cardiac clearance. Placed cardiac clearance request in VENKAT'S inbox to review and address with provider.      Speaking Coherently

## 2024-08-01 NOTE — DISCHARGE NOTE OB - AVOID TUB BATHING UNTIL YOUR POSTPARTUM VISIT
Statement Selected 1. Pain Control with tylenol/motrin; oxycodone for severe pain  2. Weight bearing as tolerated, keep toes and ankle moving. Foot will swell at bit, this is normal, elevate the leg to help with swelling.   3. Keep dressing clean dry and intact may remove ace wrap in two days keep the adhesive dressing beneath in place.   4. Follow up with Dr. Carlos as outpatient in two weeks. Call office for appointment.  5. Dressing to be removed at office visit, and repeat x-rays as needed.  6. Ice/Elevate affected area as needed but keep dry. 1. Pain Control with TYLENOL/MOTRIN; oxycodone for severe pain  2. Weight bearing as tolerated, keep toes and ankle moving. Foot will swell at bit, this is normal, elevate the leg to help with swelling.   3. Keep dressing clean dry and intact may remove ace wrap in two days keep the adhesive dressing beneath in place.   4. Follow up with Dr. Carlos as outpatient in two weeks. Call office for appointment.  5. Dressing to be removed at office visit, and repeat x-rays as needed.  6. Ice/Elevate affected area as needed but keep dry.

## 2024-08-23 NOTE — OB RN TRIAGE NOTE - NS_FETALHEARTRATE_OBGYN_ALL_OB_FT
"[facility]   Physical Therapy Treatment Note      Name: Gerda Lai  Clinic Number: [id number]    Therapy Diagnosis:   No diagnosis found.    Physician: Andi Cisneros,*    Visit Date: 8/23/2024    Physician Orders: PT Eval and Treat right gluteal   Medical Diagnosis from Referral:   Piriformis syndrome of right side [G57.01], Lumbar spondylosis [M47.816], Cervical radiculopathy [M54.12]   Evaluation Date: 8/13/2024  Authorization Period Expiration: 7/19/2025  Plan of Care Expiration: 11/13/2024  Progress Note Due: 9/13/2024  Visit # / Visits authorized: 3/20       Foto  Date / Visit# Score    #1/3 8/13/2024=1 48%   #2/3       #3/3          Precautions: Standard     Time In: 1530    Time Out: 1615  Total Appointment Time (timed & untimed codes): 45 minutes   PTA Visit #: 0/5       Subjective     Patient reports: that she is feeling OK today.     She  Pt was not instructed in a HEP  compliant with home exercise program.  Response to previous treatment: felt fine   Functional change: ongoing    Pain: not reported / 10  Location: NA    Objective      Objective Measures updated at progress report unless specified.     Treatment   Bold performed    Gerda received the treatments listed below:    Nu-step x 5 minutes      therapeutic exercises to develop strength, endurance, ROM, flexibility, posture, and core stabilization for 20 minutes including:  SUPINE  Lower trunk rotation 10 x 3    PRONE   -sustained extension 3'     SIDE LYING   -open book x12     neuromuscular re-education activities to improve: Balance, Coordination, Kinesthetic, Sense, Proprioception, and Posture for 25 minutes. The following activities were included:  SUPINE   Hook lying B hip abduction 10 x 3 with yellow oval band   Hook lying B hip adduction hold 10" x 12 ball squeeze   Bridges 10 x 3 with R knee flexed greater than L   Hip flexion with stretch x15  Gluteal sets 10 x 2    SIDE LYING   -open book x12 "   -clams x20  -hydrants x20    PRONE   -sustained extension 3'  -leg lift x15    STANDING  -leg swing abd / exten x15    manual therapy techniques: Joint mobilizations, Manual traction, and Soft tissue Mobilization were applied to the: lumbar spine for 0 minutes, including:      therapeutic activities to improve functional performance for 00  minutes, including:      gait training to improve functional mobility and safety for 00  minutes, including:      Patient Education and Home Exercises       Education provided:   - review of previous home exercises    Written Home Exercises Provided: Yes. Exercises were reviewed and Gerda was able to demonstrate them prior to the end of the session.  Gerda demonstrated good  understanding of the education provided. See Electronic Medical Record under Patient Instructions for exercises provided during therapy sessions    Assessment     Gerda performed the routine noted. She reported some difficulty with prone hip extension, but was able to perform all other Tx activities with out c/o increased discomfort. Progress with addressing mobility dysfunctions noted in the IE relative to the spine and hip     Gerda Is progressing well towards her goals.   Patient prognosis is Good.     Patient will continue to benefit from skilled outpatient physical therapy to address the deficits listed in the problem list box on initial evaluation, provide pt/family education and to maximize pt's level of independence in the home and community environment.     Patient's spiritual, cultural and educational needs considered and pt agreeable to plan of care and goals.     Anticipated barriers to physical therapy: scheduling     Goals:   Short Term Goals: 5 weeks   1. Pts pain decreased 20 - 40% for improved functional mobility and quality of life ONGOING  2. Pts PROM in hip flexion and extension increased by 10 degrees to enhance AROM and functional mobility  ONGOING  3. Pts strength in the hip  musculature increased by 1/2 grade to facilitate improved active mobility and functional stability ONGOING  4. Pt to exhibit correct return demonstration of exercises for self-management and independence with HEP ONGOING  5. Pt to demonstrate enhanced neuromuscular response  with single leg static balance for improved functional mobility and gait pattern  ONGOING  6. Pt to experience 50% or more reduced interruption of sleep due to reduced pain for improved quality of life. ONGOING     Long Term Goals : 10 weeks   1.  Pts pain level decreased to 75% or greater for with siting for restoring functional mobility and ADL. ONGOING  2. Pts AROM in hip flexion, extension, abduction increased by 10 degrees to restore functional mobility and ADL  ONGOING  3. Pts strength in the hip musculature increased by one grade to facilitate improved active mobility and functional stability  ONGOING  4. Pt will be independent with HEP for self-management. ONGOING   5. Pt to exhibit dynamic stability on the RLE / LLE for stable functional mobility and gait. ONGOING   6. Pt to demonstrate ambulation w/o pain to improve gait pattern with a cane. ONGOING   Plan     Progress Physical Therapy program to assist Pt with managing her lower back   Sx.    Ebenezer Sanchez, PT        145

## 2024-10-30 NOTE — ED ADULT NURSE NOTE - CAS TRG GENERAL AIRWAY, MLM
SUBJECTIVE:   CC: pelvic pain, ER fu                                               Kim Tang is a 20 year old  female who presents for ER follow up for 10 days of pelvic pain/RLQ pain. She was seen yesterday and had normal CT AP, CBC,a nd a negative pregnancy test. + nausea  - misses work due to menstrual cramps  - current pain is similar to dysmenorrhea but   - pain with BM around the time of her menses  - sex has always been painful - both deep penetration pain and sandpaper sensation in the vagina. Personal lubricant doesn't help.   - no contraception. Did try to conceive briefly. Occasionally uses condoms, which makes pain worse.     - she has never before had a pelvic exam and is very nervous. We talked through each aspect of the exam to decrease her anxiety as much as possible.     Soc hx: full time student, part time job, and [employer]  - lives in [place]    Problem list and histories reviewed & adjusted, as indicated.  Additional history: as documented.      There is no problem list on file for this patient.    Past Surgical History:   Procedure Laterality Date    [place] Teeth        Social History     Tobacco Use    Smoking status: Never    Smokeless tobacco: Never   Substance Use Topics    Alcohol use: Yes      Problem (# of Occurrences) Relation (Name,Age of Onset)    Endometriosis (1) Maternal Aunt    Ovarian cysts (1) Maternal Aunt              No current outpatient medications on file.     No current facility-administered medications for this visit.     No Known Allergies    OBJECTIVE:   BP 98/56   Ht 1.524 m (5')   Wt 45.4 kg (100 lb)   LMP 10/25/2024   BMI 19.53 kg/m     Const: sitting in chair in no acute distress, comfortable  Eyes: no scleral icterus, EOMI  CV: regular rate, well perfused  Pulm: no increased work of breathing, no cough  Skin: warm and dry, no rashes/lesions  Psych: mood stable, appropriate affect  Abd: soft, no hepatosplenomegaly, non-tender to  palpation  Neuro: A+Ox3   : External genitalia normal well-estrogenized, healthy tissue.  No obvious excoriations, lesions, or rashes. Bartholins, urethra, normal.  Normal moist pink vaginal mucosa.  SSE: Normal cervix, slightly increased yellow discharge   Bimanual: deferred due to pain with SSE.    ASSESSMENT/PLAN:                                                    Kim Tang is a 20 year old female  here for initial gyn visit for pelvic pain for which she was seen in the ER yesterday for sub-acute RLQ pain in the setting of severe dysmenorrhea for 12 years. In the setting of dysmenorrhea, dyspareunia, and dyschezia I am concerned for endometriosis. Ddx includes pelvic infection (for current RLQ pain, less likely explaining chronic cyclic pain) and primary dysmenorrhea. We had a long conversation about the ddx and potential treatment options. She is very resistant to hormonal intervention as she is concerned about effects on mood, hormone balance, fertility.   We discussed the pathophysiology of endometriosis and its relation to pelvic pain. We discussed the role of surgery in diagnosis and treatment, and the necessity for suppressive treatment following surgical treatment as well. Options for suppression include depo Lupron, depo Provera, oral progesterones (both as oral contraceptive pills and not), oral contraceptive pills, Mirena IUD, and Orlissa. Risks, benefits, and alternatives to each of these methods of suppression were discussed.    The plan is to proceed with combined oral contraceptives for now but she strongly desires to consider Orilissa if possible. Her biggest concern is protecting her future fertility.    Dysmenorrhea:    - We reviewed causes of dysmenorrhea, including prostaglandin mediated cramping, structural abnormalities (fibroids, polyps, adenomyosis), and endometriosis.   - Scheduled Ibuprofen 600mg every 4 hours or 800mg every 6 hours OR Aleve 2 tabs every 8-12 hours  throughout menses and starting the night before bleeding. Additionally, heating pads to the lower abdomen can be helpful.  -  Reviewed options for hormonal birth control, including oral contraceptive pills, patches, vaginal ring, implant, shot, IUD (hormonal), as well as hysteroscopy D&C, endometrial ablation or hysterectomy, if refractory to the above methods.     - will obtain pelvic US and review management options when results are available.      1. Right lower quadrant abdominal pain  - Wet prep - Clinic Collect  - NEISSERIA GONORRHOEA PCR  - CHLAMYDIA TRACHOMATIS PCR  - US Pelvic Complete with Transvaginal; Future  - Pap Screen Only - Recommended Age 21 - 24 Years  - norgestimate-ethinyl estradiol (ORTHO-CYCLEN) 0.25-35 MG-MCG tablet; Take 1 tablet by mouth daily.  Dispense: 84 tablet; Refill: 4    2. Encounter for initial prescription of contraceptive pills  - norgestimate-ethinyl estradiol (ORTHO-CYCLEN) 0.25-35 MG-MCG tablet; Take 1 tablet by mouth daily.  Dispense: 84 tablet; Refill: 4    3. Dysmenorrhea (Primary)  - norgestimate-ethinyl estradiol (ORTHO-CYCLEN) 0.25-35 MG-MCG tablet; Take 1 tablet by mouth daily.  Dispense: 84 tablet; Refill: 4       Results via phone and mychart.     Return to clinic after 6-10w to review response to oral contraceptives and review results, sooner for any concerns.     Jamila Sparks MD  Obstetrics and Gynecology   [facility]    Patent

## 2024-12-17 NOTE — ED ADULT NURSE NOTE - CAS EDP DISCH TYPE
Perception: Attention and perception normal.         Mood and Affect: Mood normal.         Speech: Speech normal.         Behavior: Behavior normal. Behavior is cooperative.         Thought Content: Thought content normal.         Cognition and Memory: Cognition and memory normal.   Vitals reviewed.     ASSESSMENT & PLAN:    Kiara Farrar is a 25 y.o. female  here for her annual women's wellness exam.   Kiara was seen today for annual exam.    Diagnoses and all orders for this visit:    Well woman exam with routine gynecological exam  -     PAP SMEAR; Future    Oral contraceptive pill surveillance    Routine screening for STI (sexually transmitted infection)  -     Vaginitis DNA Probe; Future  -     C.trachomatis N.gonorrhoeae DNA; Future    History of chlamydia  -     Vaginitis DNA Probe; Future  -     C.trachomatis N.gonorrhoeae DNA; Future    Other orders  -     metroNIDAZOLE (FLAGYL) 500 MG tablet; Take 1 tablet by mouth 2 times daily for 7 days     -VSS   -start prenatal vitamin approximately 3 months prior to attempting to conceive.  - Gardasil counseling completed for all patients 9-45 years old  -Pap Smear guidelines discussed today and collected today if indicated  -mammogram recommended annually beginning at age 40  -Osteoporosis prevention/screening recommendations reviewed as appropriate    No follow-ups on file.    Counseling Completed:  -Discussed recommendations to repeat pap as per American Society for Colposcopy and Cervical Pathology guidelines. Encouraged annual gynecologic evaluation.  -Breast cancer screening with mammography typically begins at age 40 but may be indicated sooner based on family history and patient's individual risk factors.  -Discussed birth control and barrier recommendations. Discussed STD counseling and prevention.         Routine health maintenance per patients PCP encouraged    Return to this office annually and PRN.    The patient was seen and evaluated face to face  Home

## 2025-01-29 NOTE — CONSULT NOTE ADULT - ASSESSMENT
DIMITRIOS WELCH is a 23y , POD4 from [place] who presented to the ED for incisional drainage noticed this morning.     -VSS; afebrile  -Incision intact; no signs of infection  -Wound culture collected  -Home with keflex  -Follow up with Dr. Avina as scheduled for postpartum visit    D/w Dr. Avina No Yes

## 2025-05-27 NOTE — OB PROVIDER TRIAGE NOTE - NS_DISCHARGEPRINT_OBGYN_ALL_OB
I reviewed chart. Sherly was last seen on 07/01/2024 by Dr. Lynch for a well check. On that date she ordered Prozac 40 mg .dispense 90 with 2 refills.  Dr. Lynch documented for Sherly to follow up in 6 months. There is not an 18 yr old consent on file.  I called  home # [phone]  and left voice message to call me back.  I called mobile # [phone] and left voice message to call me back.     OB Discharge Instructions

## 2025-06-05 NOTE — DISCHARGE NOTE ANTEPARTUM - PATERNITY PAPERS COMPLETED PRIOR TO DISCHARGE
Subjective    CC joints pain left knee pain  Marilyn J Pumphrey is a 76 y.o. female with multiple comorbidities including DM, polyarthralgia from  inflammatory osteoarthritis, bilateral knee pain, S/P left TKA , S/p right hip ORIF and ZAKIYA/Dr. Martin 2024 here for f/u.    Denies any new complaints.  Had right hip MRI showing no acute abnormality/just postop changes.  Keeps follow-up with Ortho.  Continues to have good relief of oxycodone and denies any side effects.    Chronic back pain radiating to bilateral hips worse with prolonged activity.  Chronic polyarthralgia/left knee pain,  and generalized myofascial pain interfering with daily activity, sleep,mood.  Considering right knee TKA.  Tried methotrexate and  Celebrex without relief.  Tried Cymbalta, sevella, tramadol without relief.  Seen Rheumatology. and ortho. considering right knee replecement.   Good releif with knee injections by ortho.      Utilizes hydrocodone 10/325  with good relief functional benefit denies side effects.    L-spine MRI 2021.  Multiple level degenerative disc disease and degenerative changes.  Small left paracentral disc protrusion at L3-L4 which appears to abut the exiting nerve root with mild encroachment on neural foramina on the left.    Pain Assessment   Location of Pain: Lower Back, R Hip, L Hip, Leg, neck pain, joint, right knee  Description of Pain: Dull/Aching, Throbbing, Stabbing  Previous Pain Rating :6  Current Pain Ratin  Aggravating Factors: Activity  Alleviating Factors: Rest, Medication  PEG Assessment   What number best describes your pain on average in the past week?9  What number best describes how, during the past week, pain has interfered with your enjoyment of life?8  What number best describes how, during the past week, pain has interfered with your general activity? 10     The following portions of the patient's history were reviewed and updated as appropriate: allergies, current medications, past  family history, past medical history, past social history, past surgical history and problem list.    Review of Systems   Musculoskeletal:  Positive for arthralgias, back pain, joint swelling, myalgias and neck pain.   All other systems reviewed and are negative.    Objective   Physical Exam   Constitutional: No distress.   Cane   Musculoskeletal:      Comments: Lumbar loading positive, pain on extension of low back past 5 degrees.  TTP on the lumbar facets noted.  Leg raise left   Vitals reviewed.    /78   Pulse 66   Resp 16   Wt 82.1 kg (181 lb)   SpO2 95%   BMI 28.35 kg/m²     PHQ 9 on chart  Opioid risk tool low risk    Assessment & Plan   Diagnoses and all orders for this visit:    1. Lumbosacral spondylosis without myelopathy (Primary)  -     oxyCODONE-acetaminophen (PERCOCET) 7.5-325 MG per tablet; Take 1 tablet by mouth 3 (Three) Times a Day As Needed for Severe Pain. DNF before 7/6/2025  Dispense: 90 tablet; Refill: 0  -     oxyCODONE-acetaminophen (PERCOCET) 7.5-325 MG per tablet; Take 1 tablet by mouth 3 (Three) Times a Day As Needed for Severe Pain.  Dispense: 90 tablet; Refill: 0    2. Polyarthralgia  -     oxyCODONE-acetaminophen (PERCOCET) 7.5-325 MG per tablet; Take 1 tablet by mouth 3 (Three) Times a Day As Needed for Severe Pain. DNF before 7/6/2025  Dispense: 90 tablet; Refill: 0  -     oxyCODONE-acetaminophen (PERCOCET) 7.5-325 MG per tablet; Take 1 tablet by mouth 3 (Three) Times a Day As Needed for Severe Pain.  Dispense: 90 tablet; Refill: 0    3. Myofascial pain syndrome    4. Right hip pain    5. High risk medication use  -     Shout Urine Drug Screen -; Future      Summary  76 y.o. female with multiple comorbidities including DM, polyarthralgia from  inflammatory osteoarthritis, knee pain S/P left TKA, right ZAKIYA/ORIF/Sweet/2024 here for f/u.  Chronic polyarthralgia/left knee pain,  and generalized myofascial pain interfering with daily activity, sleep,mood.      Denies any new  complaints.  Had right hip MRI showing no acute abnormality/just postop changes.  Keeps follow-up with Ortho.  Continues to have good relief of oxycodone and denies any side effects.    Continue oxycodone 7.5 /325 2-3 times daily as needed for severe pain.  UDS and  Inspect reviewed.    Discussed risk of tolerance, dependence, respiratory depression, coma and death associated with use of oral opioids for treatment of chronic nonmalignant pain.      Discussed increased risk with combining opioid and benzo. On clorazepate prn anxiety.    RTC in 2-3 months                    n/a

## 2025-06-30 NOTE — ED PROVIDER NOTE - INTERNATIONAL TRAVEL
Products Recommended: Sunscreens with Zinc Oxide recommended.
Detail Level: Detailed
General Sunscreen Counseling: I recommended a broad spectrum sunscreen with a SPF of 30 or higher.  I explained that SPF 30 sunscreens block approximately 97 percent of the sun's harmful rays.  Sunscreens should be applied at least 15 minutes prior to expected sun exposure and then every 2 hours after that as long as sun exposure continues. If swimming or exercising sunscreen should be reapplied every 45 minutes to an hour after getting wet or sweating.  One ounce, or the equivalent of a shot glass full of sunscreen, is adequate to protect the skin not covered by a bathing suit. I also recommended a lip balm with a sunscreen as well. Sun protective clothing can be used in lieu of sunscreen but must be worn the entire time you are exposed to the sun's rays.
No

## 2025-07-29 NOTE — ED PROVIDER NOTE - MUSCULOSKELETAL, MLM
